# Patient Record
Sex: FEMALE | Race: BLACK OR AFRICAN AMERICAN | Employment: OTHER | URBAN - METROPOLITAN AREA
[De-identification: names, ages, dates, MRNs, and addresses within clinical notes are randomized per-mention and may not be internally consistent; named-entity substitution may affect disease eponyms.]

---

## 2019-02-07 ENCOUNTER — OFFICE VISIT (OUTPATIENT)
Dept: FAMILY MEDICINE CLINIC | Age: 64
End: 2019-02-07

## 2019-02-07 VITALS
WEIGHT: 154.6 LBS | SYSTOLIC BLOOD PRESSURE: 128 MMHG | HEIGHT: 63 IN | RESPIRATION RATE: 16 BRPM | HEART RATE: 71 BPM | TEMPERATURE: 98 F | DIASTOLIC BLOOD PRESSURE: 84 MMHG | BODY MASS INDEX: 27.39 KG/M2 | OXYGEN SATURATION: 99 %

## 2019-02-07 DIAGNOSIS — Z76.89 ENCOUNTER TO ESTABLISH CARE WITH NEW DOCTOR: ICD-10-CM

## 2019-02-07 DIAGNOSIS — M06.9 RHEUMATOID ARTHRITIS, INVOLVING UNSPECIFIED SITE, UNSPECIFIED RHEUMATOID FACTOR PRESENCE: Primary | ICD-10-CM

## 2019-02-07 DIAGNOSIS — Z23 ENCOUNTER FOR IMMUNIZATION: ICD-10-CM

## 2019-02-07 DIAGNOSIS — Z12.39 SCREENING FOR BREAST CANCER: ICD-10-CM

## 2019-02-07 DIAGNOSIS — Z13.1 SCREENING FOR DIABETES MELLITUS: ICD-10-CM

## 2019-02-07 PROBLEM — I01.1 RHEUMATOID AORTITIS: Status: ACTIVE | Noted: 2019-02-07

## 2019-02-07 RX ORDER — ALENDRONATE SODIUM 70 MG/1
TABLET ORAL
Refills: 3 | COMMUNITY
Start: 2019-01-23 | End: 2019-04-26 | Stop reason: ALTCHOICE

## 2019-02-07 RX ORDER — METHOTREXATE 2.5 MG/1
TABLET ORAL
Refills: 0 | COMMUNITY
Start: 2019-01-23 | End: 2019-04-26 | Stop reason: SDUPTHER

## 2019-02-07 RX ORDER — FOLIC ACID 1 MG/1
TABLET ORAL
Refills: 0 | COMMUNITY
Start: 2018-12-16 | End: 2020-01-27 | Stop reason: ALTCHOICE

## 2019-02-07 NOTE — PROGRESS NOTES
Progress Note    Patient: Geraldine Mccabe MRN: <[id number]>  SSN: [id number]    YOB: 1955  Age: 58 y.o. Sex: female        Chief Complaint   Patient presents with   [facility] 3 Months Ago    Referral Request     Referral to Rheumatology     Subjective:     Problems addressed:  Encounter Diagnoses     ICD-10-CM ICD-9-CM   1. Rheumatoid arthritis, involving unspecified site, unspecified rheumatoid factor presence ([facility] M06.9 714.0   2. Encounter to establish care with new doctor Z76.89 V65.8   3. Screening for breast cancer Z12.31 V76.10   4. Screening for diabetes mellitus Z13.1 V77.1   5. Encounter for immunization Z23 V03.89     RA: Has been on Methotrexate for about 1.5 years. Denies joint pain at this time and no recent flares. Denies medication side effects. Wants referral to rheum. Establish care with new doctor: Moved here from [facility] 10/2018. Previous PCP: Dr. Corona Rutledge. Will sign release of information to have records sent over from previous PCP. No new concerns at all today. Current and past medical information:    Current Medications after this visit[de-identified]     Current Outpatient Medications   Medication Sig    alendronate (FOSAMAX) 70 mg tablet TK 1 T PO Q 7 DAY    folic acid (FOLVITE) 1 mg tablet TK 1 T PO DAILY    methotrexate (RHEUMATREX) 2.5 mg tablet TK 5 T PO TWICE A WEEK WITH DINNER     No current facility-administered medications for this visit. 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 No pertinent surgical history.     Social History     Socioeconomic History    Marital status:      Spouse name: Not on file    Number of children: Not on file    Years of education: Not on file    Highest education level: Not on file   Tobacco Use    Smoking status: Former Smoker    Smokeless tobacco: Never Used    Tobacco comment: Quit 3 Years Ago   Substance and Sexual Activity    Alcohol use: Yes     Comment: Social    Drug use: No    Sexual activity: No       Review of Systems   Constitutional: Negative for chills and fever. HENT: Negative for congestion and sore throat. Respiratory: Negative for cough, shortness of breath and wheezing. Cardiovascular: Negative for chest pain. Gastrointestinal: Negative for abdominal pain, nausea and vomiting. Neurological: Negative for dizziness and headaches. Objective:     Vitals:    02/07/19 1308   BP: 128/84   Pulse: 71   Resp: 16   Temp: 98 °F (36.7 °C)   TempSrc: Oral   SpO2: 99%   Weight: 154 lb 9.6 oz (70.1 kg)   Height: 5' 3\" (1.6 m)      Body mass index is 27.39 kg/m². Physical Exam   Constitutional: She appears well-developed and well-nourished. No distress. HENT:   Head: Normocephalic and atraumatic. Cardiovascular: Normal rate, regular rhythm and intact distal pulses. Exam reveals no gallop and no friction rub. No murmur heard. Pulmonary/Chest: Effort normal and breath sounds normal. No respiratory distress. She has no wheezes. She has no rales. She exhibits no tenderness. Musculoskeletal: Normal range of motion. She exhibits no edema, tenderness or deformity. Skin: Skin is warm. She is not diaphoretic. Health Maintenance Due   Topic Date Due    Hepatitis C Screening  1955    DTaP/Tdap/Td series (1 - Tdap) 07/26/1975    PAP AKA CERVICAL CYTOLOGY  07/26/1975    Shingrix Vaccine Age 50> (1 of 2) 07/26/2004    BREAST CANCER SCRN MAMMOGRAM  07/26/2004    FOBT Q 1 YEAR AGE 50-75  07/26/2004    Influenza Age 9 to Adult  08/01/2018       Assessment and orders:     Encounter Diagnoses     ICD-10-CM ICD-9-CM   1. Rheumatoid arthritis, involving unspecified site, unspecified rheumatoid factor presence ([facility] M06.9 714.0   2. Encounter to establish care with new doctor Z76.89 V65.8   3. Screening for breast cancer Z12.31 V76.10   4. Screening for diabetes mellitus Z13.1 V77.1   5. Encounter for immunization Z23 V03.89     1. Rheumatoid arthritis:  - REFERRAL TO RHEUMATOLOGY  - CBC WITH AUTOMATED DIFF  - METABOLIC PANEL, COMPREHENSIVE  - LIPID PANEL    3. Screening for breast cancer  - HODA MAMMO BI SCREENING INCL CAD; Future    4. Screening for diabetes mellitus  - HEMOGLOBIN A1C WITH EAG    5. Encounter for immunization  - varicella-zoster recombinant, PF, (SHINGRIX) 50 mcg/0.5 mL susr injection; 0.5 mL by IntraMUSCular route once for 1 dose. Dispense: 0.5 mL; Refill: 1    Follow up for well woman exam.     Plan of care:  Discussed diagnoses in detail with patient. Medication risks/benefits/side effects discussed with patient. All of the patient's questions were addressed. The patient understands and agrees with our plan of care. The patient knows to call back if they are unsure of or forget any changes we discussed today or if the symptoms change. The patient received an After-Visit Summary which contains VS, orders, medication list and allergy list. This can be used as a \"mini-medical record\" should they have to seek medical care while out of town. Follow-up Disposition:  Return in about 6 months (around 8/7/2019). No future appointments.     Signed By: Tim Villa MD     February 7, 2019

## 2019-02-07 NOTE — PROGRESS NOTES
58year old female here to establish care    Has rheumatoid arthritis    Needs referral to rheumatologist    Only other medication is fosamax    Routine labs today    I reviewed with the resident the medical history and the resident's findings on the physical examination. I discussed with the resident the patient's diagnosis and concur with the plan.

## 2019-02-07 NOTE — PROGRESS NOTES
Identified Patient with two Patient identifiers (Name and ). Two Patient Identifiers confirmed. Reviewed record in preparation for visit and have obtained necessary documentation. Chief Complaint   Patient presents with   [facility] [place] 3 Months Ago    Medication Refill    Referral Request     Referral to Rheumatology       Visit Vitals  Ht 5' 3\" (1.6 m)   Wt 154 lb 9.6 oz (70.1 kg)   BMI 27.39 kg/m²       1. Have you been to the ER, urgent care clinic since your last visit? Hospitalized since your last visit? No    2. Have you seen or consulted any other health care providers outside of the [address] [place] [address] since your last visit? Include any pap smears or colon screening.  No

## 2019-02-07 NOTE — PATIENT INSTRUCTIONS
Nan Lr. MD Daniel  [facility]  [facility] [address] [facility], [address]  Phone: [phone]  Fax: [phone]    Jasper Gonzalez.  MD Louie Keys MD  [facility] [address] [facility], [address] [facility]  Phone: [phone]  Fax: [phone]

## 2019-02-08 LAB
ALBUMIN SERPL-MCNC: 4.2 G/DL (ref 3.6–4.8)
ALBUMIN/GLOB SERPL: 1.4 {RATIO} (ref 1.2–2.2)
ALP SERPL-CCNC: 52 IU/L (ref 39–117)
ALT SERPL-CCNC: 28 IU/L (ref 0–32)
AST SERPL-CCNC: 32 IU/L (ref 0–40)
BASOPHILS # BLD AUTO: 0 X10E3/UL (ref 0–0.2)
BASOPHILS NFR BLD AUTO: 1 %
BILIRUB SERPL-MCNC: 0.4 MG/DL (ref 0–1.2)
BUN SERPL-MCNC: 14 MG/DL (ref 8–27)
BUN/CREAT SERPL: 24 (ref 12–28)
CALCIUM SERPL-MCNC: 10.1 MG/DL (ref 8.7–10.3)
CHLORIDE SERPL-SCNC: 103 MMOL/L (ref 96–106)
CHOLEST SERPL-MCNC: 198 MG/DL (ref 100–199)
CO2 SERPL-SCNC: 21 MMOL/L (ref 20–29)
CREAT SERPL-MCNC: 0.58 MG/DL (ref 0.57–1)
EOSINOPHIL # BLD AUTO: 0.3 X10E3/UL (ref 0–0.4)
EOSINOPHIL NFR BLD AUTO: 3 %
ERYTHROCYTE [DISTWIDTH] IN BLOOD BY AUTOMATED COUNT: 13.9 % (ref 12.3–15.4)
EST. AVERAGE GLUCOSE BLD GHB EST-MCNC: 105 MG/DL
GLOBULIN SER CALC-MCNC: 2.9 G/DL (ref 1.5–4.5)
GLUCOSE SERPL-MCNC: 90 MG/DL (ref 65–99)
HBA1C MFR BLD: 5.3 % (ref 4.8–5.6)
HCT VFR BLD AUTO: 38.5 % (ref 34–46.6)
HDLC SERPL-MCNC: 63 MG/DL
HGB BLD-MCNC: 13 G/DL (ref 11.1–15.9)
IMM GRANULOCYTES # BLD AUTO: 0 X10E3/UL (ref 0–0.1)
IMM GRANULOCYTES NFR BLD AUTO: 0 %
INTERPRETATION, 910389: NORMAL
LDLC SERPL CALC-MCNC: 120 MG/DL (ref 0–99)
LYMPHOCYTES # BLD AUTO: 2.1 X10E3/UL (ref 0.7–3.1)
LYMPHOCYTES NFR BLD AUTO: 25 %
MCH RBC QN AUTO: 32.8 PG (ref 26.6–33)
MCHC RBC AUTO-ENTMCNC: 33.8 G/DL (ref 31.5–35.7)
MCV RBC AUTO: 97 FL (ref 79–97)
MONOCYTES # BLD AUTO: 0.7 X10E3/UL (ref 0.1–0.9)
MONOCYTES NFR BLD AUTO: 8 %
NEUTROPHILS # BLD AUTO: 5.5 X10E3/UL (ref 1.4–7)
NEUTROPHILS NFR BLD AUTO: 63 %
PLATELET # BLD AUTO: 373 X10E3/UL (ref 150–379)
POTASSIUM SERPL-SCNC: 4.5 MMOL/L (ref 3.5–5.2)
PROT SERPL-MCNC: 7.1 G/DL (ref 6–8.5)
RBC # BLD AUTO: 3.96 X10E6/UL (ref 3.77–5.28)
SODIUM SERPL-SCNC: 141 MMOL/L (ref 134–144)
TRIGL SERPL-MCNC: 75 MG/DL (ref 0–149)
VLDLC SERPL CALC-MCNC: 15 MG/DL (ref 5–40)
WBC # BLD AUTO: 8.6 X10E3/UL (ref 3.4–10.8)

## 2019-03-27 ENCOUNTER — OFFICE VISIT (OUTPATIENT)
Dept: RHEUMATOLOGY | Age: 64
End: 2019-03-27

## 2019-03-27 VITALS
WEIGHT: 152 LBS | OXYGEN SATURATION: 95 % | TEMPERATURE: 98.3 F | HEIGHT: 63 IN | RESPIRATION RATE: 18 BRPM | SYSTOLIC BLOOD PRESSURE: 127 MMHG | HEART RATE: 73 BPM | BODY MASS INDEX: 26.93 KG/M2 | DIASTOLIC BLOOD PRESSURE: 84 MMHG

## 2019-03-27 DIAGNOSIS — M19.042 PRIMARY OSTEOARTHRITIS OF BOTH HANDS: ICD-10-CM

## 2019-03-27 DIAGNOSIS — M19.041 PRIMARY OSTEOARTHRITIS OF BOTH HANDS: ICD-10-CM

## 2019-03-27 DIAGNOSIS — M81.0 OSTEOPOROSIS, UNSPECIFIED OSTEOPOROSIS TYPE, UNSPECIFIED PATHOLOGICAL FRACTURE PRESENCE: ICD-10-CM

## 2019-03-27 DIAGNOSIS — Z79.60 LONG-TERM USE OF IMMUNOSUPPRESSANT MEDICATION: ICD-10-CM

## 2019-03-27 DIAGNOSIS — M05.9 SEROPOSITIVE RHEUMATOID ARTHRITIS (HCC): Primary | ICD-10-CM

## 2019-03-27 NOTE — PROGRESS NOTES
REASON FOR VISIT    This is the initial evaluation for Ms. Adrian Silva a 58 y.o.  female for question of rheumatoid arthritis. The patient is referred to the [facility] at the request of Dr. Shania Martin. HISTORY OF PRESENT ILLNESS     This is a 58 y.o. female with reported history of rheumatoid arthritis on methotrexate 2.5 mg oral weekly and osteoporosis on Fosamax 70 mg oral weekly. MHAQ: 0.25  Pain scale: 75/100    The patient notes that she was diagnosed with rheumatoid arthritis in 2013 due to pain in hands and swelling. The patient was seen by a rheumatologist in [place]. She was put on prednisone briefly. The patient was put on methotrexate 12.5 mg twice a week with daily folic acid. She did very well with the methotrexate. She was having 0 pain. She was told to stop taking the methotrexate once day of the week so she was taking 12.5 mg oral weekly. This was a year ago. She was still doing well. But then she started exercising and picking up weights which was about 3 months ago and then her rheumatoid flared. She has swelling in wrists. She has pain in hands and they are swollen. Morning stiffness for a few hours. She has to put her hands in the hot water. She takes aleve sometime and it helps for a little while. No recent use of prednisone. Last time she saw her rheumatologist was in August 2018. Since she was having some pain she increased her methotrexate back to 10 pills a week (split over Saturday and Sunday). She fell in 2006 and broke wrist in 2 places. She has been on fosamax since 2013 as well. She had a DEXA scan and it showed osteoporosis. NO other issues. REVIEW OF SYSTEMS    A 15 point review of systems was performed and summarized below. The questionnaire was reviewed with the patient and scanned into the patient's medical record.     General: denies recent weight gain, recent weight loss, fatigue, weakness, fever, night sweats  Musculoskeletal: endorses joint pain, joint swelling, morning stiffness (lasting 180 minutes), muscle pain  denies   Ears:  denies ringing in ears, loss of hearing, deafness  Eyes:  denies pain, redness, loss of vision, double vision, blurred vision, dryness, foreign body sensation  Mouth:  denies sore tongue, oral ulcers, bleeding gums, loss of taste, dryness, increased dental caries  Nose:  denies nosebleeds, loss of smell, nasal ulcers  Throat: denies frequent sore throats, hoarseness, difficulty in swallowing, pain in jaw while chewing  Neck: denies swollen glands, tender glands  Cardiopulmonary: denies pain in chest, irregular heart beat, sudden changes in heart beat, shortness of breath, difficulty breathing at night, dry cough, productive cough, coughing of blood, wheezing  Gastrointestinal: denies nausea, heartburn, stomach pain relieved by food, vomiting of blood/\"coffee grounds\", jaundice, increasing constipation, persistent diarrhea, blood in stools, black stools  Genitourinary: denies nocturia, difficult urination, pain or burning on urination, blood in urine, cloudy urine, pus in urine, genital discharge, frequent urination, vaginal dryness, rash/ulcers, sexual difficulties  Hematologic:  denies anemia, bleeding tendency, blood clots  Skin:  denies easy bruising, sun sensitive, rash, redness, hives, skin tightness, nodules/bumps, hair loss, color changes of hands or feet in the cold (Raynaud's), nailbed changes, mechanics hands  Neurologic: denies headaches, dizziness, muscle weakness, numbness or tingling in hands/feet, memory loss  Psychiatric:  denies depression, excessive worries, PTSD, Bipolar  Sleep: denies poor sleep (8 hours), denies snoring, apnea, daytime somnolence, difficulty falling asleep, difficulty staying asleep     PAST MEDICAL HISTORY    Past Medical History:   Diagnosis Date    Osteoporosis     Rheumatoid arthritis ([facility]         History reviewed.  No pertinent surgical history. FAMILY HISTORY    Family History   Problem Relation Age of Onset    No Known Problems Mother     No Known Problems Father        SOCIAL HISTORY    Social History     Tobacco Use    Smoking status: Former Smoker    Smokeless tobacco: Never Used    Tobacco comment: Quit 3 Years Ago   Substance Use Topics    Alcohol use: Yes     Comment: Social    Drug use: No       MEDICATIONS    Current Outpatient Medications   Medication Sig Dispense Refill    alendronate (FOSAMAX) 70 mg tablet TK 1 T PO Q 7 DAY  3    folic acid (FOLVITE) 1 mg tablet TK 1 T PO DAILY  0    methotrexate (RHEUMATREX) 2.5 mg tablet TK 5 T PO TWICE A WEEK WITH DINNER  0       ALLERGIES    No Known Allergies    PHYSICAL EXAMINATION    Visit Vitals  /84 (BP 1 Location: Right arm, BP Patient Position: Sitting)   Pulse 73   Temp 98.3 °F (36.8 °C) (Oral)   Resp 18   Ht 5' 3\" (1.6 m)   Wt 152 lb (68.9 kg)   SpO2 95%   BMI 26.93 kg/m²     Body mass index is 26.93 kg/m². General: NAD  HEENT: PERRL, anicteric, non-injected sclerae; oropharynx without ulcers, erythema, or exudate. Moist mucous membranes. Lymphatic: No cervical or axillary lymphadenopathy. Cardiovascular: S1, S2,no R/M/G  Pulmonary: CTA b/l. No wheezes/rales/rhonchi. Abdominal: Soft,NTND, + BS. Skin: No rash, nodules, or periungual changes.   Neuro: Alert; able to carry normal conversation    Musculoskeletal:   B/l CMC squaring noted    Joint Count 3/27/2019   Patient pain (0-100) 75   MHAQ 0.25   Left thumb IP - Swollen 0   Left 3rd PIP - Tender 0   Right wrist- Tender 1   Right wrist- Swollen 1   Tender Joint Count (Total) 1   Swollen Joint Count (Total) 1   Physician Assessment (0-10) 4   Patient Assessment (0-10) 5   CDAI Total (calculated) 11       DATA REVIEW    Prior medical records were reviewed and if applicable are summarized as below:    Labs:   2/2019: cbc, CMP unremarkable    Imaging:   N/A    ASSESSMENT AND PLAN    A 58 y.o. female with hx of osteoporosis on fosamax, rheumatoid arthritis on methotrexate 25 mg oral weekly presents to establish care for rheumatoid arthritis. The patient has low to moderate disease activity and is doing well overall. She has some symptoms still but she only recently increased her methotrexate. # Rheumatoid arthritis:  - for now continue methotrexate 25 mg oral weekly  - RF, CCP, ESR, CRP  - b/l hand and foot xrays   - daily folic acid    # Osteoporosis:  - on fosamax weekly  - DEXA scan ordered today    # Medication Toxicity Monitoring:  - cbc, cmp normal last month  - Hepatitis B, C: ordered today  - Quant gold: ordered today  - Immunizations: discuss at next visit. - bone health: see above    The patient voiced understanding of the aforementioned assessment and plan. Summary of plan was provided in the After Visit Summary patient instructions. I also provided education about MyChart setup and utility. Ms. Lazarus Pozo has a reminder for a \"due or due soon\" health maintenance. I have asked that she contact her primary care provider for follow-up on this health maintenance.     TODAY'S ORDERS    Orders Placed This Encounter    QUANTIFERON-TB GOLD PLUS    XR HAND RT MIN 3 V    XR HAND LT MIN 3 V    XR FOOT RT MIN 3 V    XR FOOT LT MIN 3 V    DEXA BONE DENSITY STUDY AXIAL    CYCLIC CITRUL PEPTIDE AB, IGG    SED RATE (ESR)    C REACTIVE PROTEIN, QT    RHEUMATOID FACTOR, QL    CHRONIC HEPATITIS PANEL       Future Appointments   Date Time Provider Department Center   4/24/2019 10:00 AM Gaurang Patterson  Gloria Patrick MD    Adult Rheumatology   [facility]  A Part of [facility], [address] [place] [address]   Phone [phone]  Fax [phone]

## 2019-03-27 NOTE — PROGRESS NOTES
Chief Complaint   Patient presents with    Joint Pain     hands     1. Have you been to the ER, urgent care clinic since your last visit? Hospitalized since your last visit? No    2. Have you seen or consulted any other health care providers outside of the [address] [place] [address] since your last visit? Include any pap smears or colon screening.  No

## 2019-03-31 LAB
CCP IGA+IGG SERPL IA-ACNC: >250 UNITS (ref 0–19)
COMMENT, 144067: NORMAL
CRP SERPL-MCNC: 4.2 MG/L (ref 0–4.9)
ERYTHROCYTE [SEDIMENTATION RATE] IN BLOOD BY WESTERGREN METHOD: 29 MM/HR (ref 0–40)
GAMMA INTERFERON BACKGROUND BLD IA-ACNC: 0.02 IU/ML
HBV CORE AB SERPL QL IA: NEGATIVE
HBV CORE IGM SERPL QL IA: ABNORMAL
HBV E AB SERPL QL IA: NEGATIVE
HBV E AG SERPL QL IA: NEGATIVE
HBV SURFACE AB SER QL: NON REACTIVE
HBV SURFACE AG SERPL QL IA: NEGATIVE
HCV AB S/CO SERPL IA: <0.1 S/CO RATIO (ref 0–0.9)
M TB IFN-G BLD-IMP: NEGATIVE
M TB IFN-G CD4+ BCKGRND COR BLD-ACNC: 0.02 IU/ML
MITOGEN IGNF BLD-ACNC: >10 IU/ML
QUANTIFERON INCUBATION, QF1T: NORMAL
QUANTIFERON TB2 AG: 0.01 IU/ML
RHEUMATOID FACT SERPL-ACNC: 172.7 IU/ML (ref 0–13.9)
SERVICE CMNT-IMP: NORMAL

## 2019-04-01 DIAGNOSIS — Z79.60 LONG-TERM USE OF IMMUNOSUPPRESSANT MEDICATION: Primary | ICD-10-CM

## 2019-04-01 NOTE — PROGRESS NOTES
Patient notified of the above. States she is out of town and won't be back until Thursday. States she will have blood work done when she returns.

## 2019-04-12 ENCOUNTER — HOSPITAL ENCOUNTER (OUTPATIENT)
Dept: MAMMOGRAPHY | Age: 64
Discharge: HOME | End: 2019-04-12
Attending: INTERNAL MEDICINE
Payer: COMMERCIAL

## 2019-04-12 DIAGNOSIS — M05.9 SEROPOSITIVE RHEUMATOID ARTHRITIS (HCC): ICD-10-CM

## 2019-04-12 DIAGNOSIS — M81.0 OSTEOPOROSIS, UNSPECIFIED OSTEOPOROSIS TYPE, UNSPECIFIED PATHOLOGICAL FRACTURE PRESENCE: ICD-10-CM

## 2019-04-12 LAB
COMMENT, 144067: NORMAL
HBV CORE AB SERPL QL IA: NEGATIVE
HBV CORE IGM SERPL QL IA: NEGATIVE
HBV DNA SERPL NAA+PROBE-ACNC: NORMAL IU/ML
HBV DNA SERPL NAA+PROBE-LOG IU: NORMAL LOG10 IU/ML
HBV E AB SERPL QL IA: NEGATIVE
HBV E AG SERPL QL IA: NEGATIVE
HBV SURFACE AB SER QL: NON REACTIVE
HBV SURFACE AG SERPL QL IA: NEGATIVE
HCV AB S/CO SERPL IA: <0.1 S/CO RATIO (ref 0–0.9)
REF LAB TEST REF RANGE: NORMAL

## 2019-04-12 PROCEDURE — 77080 DXA BONE DENSITY AXIAL: CPT

## 2019-04-26 ENCOUNTER — OFFICE VISIT (OUTPATIENT)
Dept: RHEUMATOLOGY | Age: 64
End: 2019-04-26

## 2019-04-26 VITALS
RESPIRATION RATE: 20 BRPM | HEIGHT: 61 IN | BODY MASS INDEX: 27.9 KG/M2 | DIASTOLIC BLOOD PRESSURE: 76 MMHG | TEMPERATURE: 98.5 F | OXYGEN SATURATION: 98 % | SYSTOLIC BLOOD PRESSURE: 135 MMHG | HEART RATE: 62 BPM | WEIGHT: 147.8 LBS

## 2019-04-26 DIAGNOSIS — Z79.60 LONG-TERM USE OF IMMUNOSUPPRESSANT MEDICATION: ICD-10-CM

## 2019-04-26 DIAGNOSIS — M81.0 OSTEOPOROSIS, UNSPECIFIED OSTEOPOROSIS TYPE, UNSPECIFIED PATHOLOGICAL FRACTURE PRESENCE: ICD-10-CM

## 2019-04-26 DIAGNOSIS — M05.9 SEROPOSITIVE RHEUMATOID ARTHRITIS (HCC): Primary | ICD-10-CM

## 2019-04-26 DIAGNOSIS — M19.042 PRIMARY OSTEOARTHRITIS OF BOTH HANDS: ICD-10-CM

## 2019-04-26 DIAGNOSIS — M19.041 PRIMARY OSTEOARTHRITIS OF BOTH HANDS: ICD-10-CM

## 2019-04-26 RX ORDER — METHOTREXATE 2.5 MG/1
20 TABLET ORAL
Qty: 40 TAB | Refills: 3 | Status: SHIPPED | OUTPATIENT
Start: 2019-04-27 | End: 2019-06-14 | Stop reason: SDUPTHER

## 2019-04-26 NOTE — PROGRESS NOTES
REASON FOR VISIT Patient presents for a follow up visit. HISTORY OF DISEASE: Seropositive Rheumatoid Arthritis; erosive Year of diagnosis: 2013 First visit with me:  3/2019 Cumulative disease manifestations:  
Positive serologies/labs: RF, CCP Negative serologies/labs: Other co-morbidities:  osteoporosis Relapses: 12/2018 but on not treatment Past treatment history: 
Prednisone:  
Non-biologic DMARD: Methotrexate (2013-present) Biologic: 
Cyclophosphamide:  
Rituximab: Other: 
  
HISTORY OF PRESENT ILLNESS The patient notes that her joints are doing very well. She has cut out the weights. She is currently taking 5 pills on Saturday and 5 pills on Sunday. She takes daily folic acid. She denies any swelling in joints. No stiffness in the morning in joints. She has been on alendronate since 2013. REVIEW OF SYSTEMS A comprehensive review of systems was performed and pertinent results are documented in the HPI, review of systems is otherwise non-contributory. PAST MEDICAL HISTORY Past Medical History:  
Diagnosis Date  Osteoporosis  Rheumatoid arthritis ([facility] History reviewed. No pertinent surgical history. FAMILY HISTORY Family History Problem Relation Age of Onset  No Known Problems Mother  No Known Problems Father SOCIAL HISTORY Social History Tobacco Use  Smoking status: Former Smoker  Smokeless tobacco: Never Used  Tobacco comment: Quit 3 Years Ago Substance Use Topics  Alcohol use: Yes Comment: Social  
 Drug use: No  
 
 
MEDICATIONS Current Outpatient Medications Medication Sig Dispense Refill  [START ON 4/27/2019] methotrexate (RHEUMATREX) 2.5 mg tablet Take 8 Tabs by mouth Every Saturday. 40 Tab 3  
 calcium carbonate-vitamin D3 (CALTRATE 600 + D) 600 mg (1,500 mg)-800 unit chew Take 2 Tabs by mouth daily.  482 Tab 3  
 folic acid (FOLVITE) 1 mg tablet TK 1 T PO DAILY  0  
 
 
ALLERGIES 
 
 No Known Allergies PHYSICAL EXAMINATION Visit Vitals /76 (BP 1 Location: Left arm, BP Patient Position: Sitting) Pulse 62 Temp 98.5 °F (36.9 °C) (Oral) Resp 20 Ht 5' 1\" (1.549 m) Wt 147 lb 12.8 oz (67 kg) SpO2 98% BMI 27.93 kg/m² Body mass index is 27.93 kg/m². General: NAD HEENT: PERRL, anicteric, non-injected sclerae; oropharynx without ulcers, erythema, or exudate. Moist mucous membranes. Lymphatic: No cervical or axillary lymphadenopathy. Cardiovascular: S1, S2,no R/M/G Pulmonary: CTA b/l. No wheezes/rales/rhonchi. Abdominal: Soft,NTND, + BS. Skin: No rash, nodules, or periungual changes. Neuro: Alert; able to carry normal conversation Musculoskeletal: B/l CMC squaring noted Right wrist with a fusion s/p injury Joint Count 4/26/2019 3/27/2019 Patient pain (0-100) 0 75 MHAQ 0 0.25 Left 4th MCP - Tender 0 - Left 4th MCP - Swollen 0 - Left thumb IP - Swollen - 0 Left 3rd PIP - Tender - 0 Right wrist- Tender - 1 Right wrist- Swollen - 1 Tender Joint Count (Total) 0 1 Swollen Joint Count (Total) 0 1 Physician Assessment (0-10) 0 4 Patient Assessment (0-10) 0 5 CDAI Total (calculated) 0 11 DATA REVIEW Prior medical records were reviewed and if applicable are summarized as below: 
 
Labs:  
3/2019: CCP, RF positive, ESR, CRP normal, Hep B, C, quant gold negative 2/2019: cbc, CMP unremarkable Imaging:  
Hand X-rays (3/2019): Personally reviewed images. + erosions consistent with RA and severe OA Foot x-rays (3/2019): Personally reviewed images. + erosions DEXA (3/2019): T score -2.6 at the left radius ASSESSMENT AND PLAN 
 
A 58 y.o. female with hx of osteoporosis on fosamax, seropositive erosive rheumatoid arthritis on methotrexate 25 mg oral weekly presents for a follow up visit. Patient has joint damage but she appears to have erosive osteoarthritis as well and large part of damage is from erosive OA.  Given that she is symptomatically feeling well and has had many different dosages (including incorrect administration) of methotrexate, I will hold off on escalating therapy. # Seropositive erosive Rheumatoid arthritis: 
- advised patient to take methotrexate on the same day of the week. Take 20 mg oral weekly 
- daily folic acid. - If need to, will add xeljanz at next visit. # Osteoporosis: 
- on fosamax weekly but patient has been on it for over 5 years. She needs a drug holiday 
- will change medication to prolia ever 6 months. Explained the risks and benefits of the medication. Will place the order today. # Hand osteoarthritis: largely asymptomatic. Erosive osteoarthritis. - will continue to monitor # Medication Toxicity Monitoring: 
- cbc, cmp normal 2/2019 
- Hepatitis B, C: negative 3/2019 
- Quant gold: negative 3/2019 - Immunizations: discuss at next visit. - bone health: see above The patient voiced understanding of the aforementioned assessment and plan. Summary of plan was provided in the After Visit Summary patient instructions. I also provided education about [employer] setup and utility. Ms. [address] [facility] has a reminder for a \"due or due soon\" health maintenance. I have asked that she contact her primary care provider for follow-up on this health maintenance. TODAY'S ORDERS Orders Placed This Encounter  methotrexate (RHEUMATREX) 2.5 mg tablet  calcium carbonate-vitamin D3 (CALTRATE 600 + D) 600 mg (1,500 mg)-800 unit chew Future Appointments Date Time Provider Catherine Reynaga 7/26/2019 10:00 AM Landy Melton MD [address] [place] [address] Ken Betancur MD 
 
Adult Rheumatology [facility], [address] [place] [address] Phone [phone] Fax [phone]

## 2019-04-26 NOTE — PATIENT INSTRUCTIONS
Methotrexate: Take 8 pills every Saturday. Take daily folic acid. Stop the Alendronate I will put in a prescription for Prolia. You will get it twice a year. You will get a call to schedule it. Take daily vitamin D and calcium

## 2019-04-26 NOTE — PROGRESS NOTES
Chief Complaint Patient presents with  Joint Pain 1. Have you been to the ER, urgent care clinic since your last visit? Hospitalized since your last visit? No 
 
2. Have you seen or consulted any other health care providers outside of the [address] [place] [address] since your last visit? Include any pap smears or colon screening.  No

## 2019-05-13 ENCOUNTER — HOSPITAL ENCOUNTER (OUTPATIENT)
Dept: INFUSION THERAPY | Age: 64
Discharge: HOME | End: 2019-05-13
Payer: COMMERCIAL

## 2019-05-13 VITALS
HEART RATE: 80 BPM | SYSTOLIC BLOOD PRESSURE: 130 MMHG | TEMPERATURE: 98 F | DIASTOLIC BLOOD PRESSURE: 80 MMHG | RESPIRATION RATE: 16 BRPM

## 2019-05-13 LAB
ALBUMIN SERPL-MCNC: 3.5 G/DL (ref 3.5–5)
ANION GAP SERPL CALC-SCNC: 7 MMOL/L (ref 5–15)
BUN SERPL-MCNC: 12 MG/DL (ref 6–20)
BUN/CREAT SERPL: 16 (ref 12–20)
CALCIUM SERPL-MCNC: 9.7 MG/DL (ref 8.5–10.1)
CHLORIDE SERPL-SCNC: 107 MMOL/L (ref 97–108)
CO2 SERPL-SCNC: 26 MMOL/L (ref 21–32)
CREAT SERPL-MCNC: 0.75 MG/DL (ref 0.55–1.02)
GLUCOSE SERPL-MCNC: 96 MG/DL (ref 65–100)
MAGNESIUM SERPL-MCNC: 2.4 MG/DL (ref 1.6–2.4)
PHOSPHATE SERPL-MCNC: 3.1 MG/DL (ref 2.6–4.7)
PHOSPHATE SERPL-MCNC: 3.2 MG/DL (ref 2.6–4.7)
POTASSIUM SERPL-SCNC: 3.9 MMOL/L (ref 3.5–5.1)
SODIUM SERPL-SCNC: 140 MMOL/L (ref 136–145)

## 2019-05-13 PROCEDURE — 80047 BASIC METABLC PNL IONIZED CA: CPT

## 2019-05-13 PROCEDURE — 36415 COLL VENOUS BLD VENIPUNCTURE: CPT

## 2019-05-13 PROCEDURE — 84100 ASSAY OF PHOSPHORUS: CPT

## 2019-05-13 PROCEDURE — 96372 THER/PROPH/DIAG INJ SC/IM: CPT

## 2019-05-13 PROCEDURE — 74011250636 HC RX REV CODE- 250/636: Performed by: INTERNAL MEDICINE

## 2019-05-13 PROCEDURE — 83735 ASSAY OF MAGNESIUM: CPT

## 2019-05-13 PROCEDURE — 80069 RENAL FUNCTION PANEL: CPT

## 2019-05-13 RX ADMIN — DENOSUMAB 60 MG: 60 INJECTION SUBCUTANEOUS at 13:26

## 2019-05-13 NOTE — PROGRESS NOTES
[facility] VISIT NOTE 
 
1110 Pt arrived at [place] [employer] [facility] and in no distress for Prolia. Assessment completed, pt denies any complaints today. Discussed common side effects of Prolia. Blood pressure 130/80, pulse 80, temperature 98 °F (36.7 °C), resp. rate 16, not currently breastfeeding. Labs drawn peripherally. ISTAT machine did not result. Renal panel ordered and sent to the hospital.  All labs are within treatment parameters. Recent Results (from the past 12 hour(s)) MAGNESIUM Collection Time: 05/13/19 11:20 AM  
Result Value Ref Range Magnesium 2.4 1.6 - 2.4 mg/dL PHOSPHORUS Collection Time: 05/13/19 11:20 AM  
Result Value Ref Range Phosphorus 3.2 2.6 - 4.7 MG/DL RENAL FUNCTION PANEL Collection Time: 05/13/19 11:20 AM  
Result Value Ref Range Sodium 140 136 - 145 mmol/L Potassium 3.9 3.5 - 5.1 mmol/L Chloride 107 97 - 108 mmol/L  
 CO2 26 21 - 32 mmol/L Anion gap 7 5 - 15 mmol/L Glucose 96 65 - 100 mg/dL BUN 12 6 - 20 MG/DL Creatinine 0.75 0.55 - 1.02 MG/DL  
 BUN/Creatinine ratio 16 12 - 20 GFR est AA >60 >60 ml/min/1.73m2 GFR est non-AA >60 >60 ml/min/1.73m2 Calcium 9.7 8.5 - 10.1 MG/DL Phosphorus 3.1 2.6 - 4.7 MG/DL Albumin 3.5 3.5 - 5.0 g/dL Medications received: 
Prolia SQ in right upper arm Tolerated treatment well, no adverse reaction noted. [address] D/C'd from [email or web address] and in no distress.  Next appointment is 11/11/19 at 11am.

## 2019-05-16 LAB
ANION GAP BLD CALC-SCNC: ABNORMAL MMOL/L (ref 10–20)
BUN BLD-MCNC: ABNORMAL MG/DL (ref 9–20)
CA-I BLD-MCNC: 1.16 MMOL/L (ref 1.12–1.32)
CHLORIDE BLD-SCNC: ABNORMAL MMOL/L (ref 98–107)
CO2 BLD-SCNC: ABNORMAL MMOL/L (ref 21–32)
CREAT BLD-MCNC: 0.7 MG/DL (ref 0.6–1.3)
GLUCOSE BLD-MCNC: 102 MG/DL (ref 65–100)
HCT VFR BLD CALC: 38 % (ref 35–47)
POTASSIUM BLD-SCNC: 4 MMOL/L (ref 3.5–5.1)
SERVICE CMNT-IMP: ABNORMAL
SODIUM BLD-SCNC: 142 MMOL/L (ref 136–145)

## 2019-06-14 RX ORDER — METHOTREXATE 2.5 MG/1
20 TABLET ORAL
Qty: 40 TAB | Refills: 3 | Status: SHIPPED | OUTPATIENT
Start: 2019-06-15 | End: 2019-10-25 | Stop reason: SDUPTHER

## 2019-06-14 NOTE — TELEPHONE ENCOUNTER
----- Message from [place] sent at 6/14/2019 11:55 AM EDT -----  Regarding: Dr. Richard [place]: [phone]  Pt needs prescription (Methotrexate) sent to [facility] on file.

## 2019-07-26 ENCOUNTER — OFFICE VISIT (OUTPATIENT)
Dept: RHEUMATOLOGY | Age: 64
End: 2019-07-26

## 2019-07-26 VITALS
DIASTOLIC BLOOD PRESSURE: 82 MMHG | RESPIRATION RATE: 18 BRPM | HEART RATE: 66 BPM | OXYGEN SATURATION: 99 % | SYSTOLIC BLOOD PRESSURE: 137 MMHG | BODY MASS INDEX: 28.51 KG/M2 | HEIGHT: 61 IN | TEMPERATURE: 98.8 F | WEIGHT: 151 LBS

## 2019-07-26 DIAGNOSIS — Z79.60 LONG-TERM USE OF IMMUNOSUPPRESSANT MEDICATION: ICD-10-CM

## 2019-07-26 DIAGNOSIS — M19.042 PRIMARY OSTEOARTHRITIS OF BOTH HANDS: ICD-10-CM

## 2019-07-26 DIAGNOSIS — M19.041 PRIMARY OSTEOARTHRITIS OF BOTH HANDS: ICD-10-CM

## 2019-07-26 DIAGNOSIS — M81.0 OSTEOPOROSIS, UNSPECIFIED OSTEOPOROSIS TYPE, UNSPECIFIED PATHOLOGICAL FRACTURE PRESENCE: ICD-10-CM

## 2019-07-26 DIAGNOSIS — M05.9 SEROPOSITIVE RHEUMATOID ARTHRITIS (HCC): Primary | ICD-10-CM

## 2019-07-26 NOTE — PATIENT INSTRUCTIONS
Please fill out your [address] you will receive after your visit in the mail or via [phone]!

## 2019-07-26 NOTE — PROGRESS NOTES
REASON FOR VISIT    Patient presents for a follow up visit. HISTORY OF DISEASE: Seropositive Rheumatoid Arthritis; erosive    Year of diagnosis: 2013  First visit with me:  3/2019  Cumulative disease manifestations:   Positive serologies/labs: RF, CCP  Negative serologies/labs: Other co-morbidities:  osteoporosis  Relapses: 12/2018 but on not treatment     Past treatment history:  Prednisone:   Non-biologic DMARD: Methotrexate 20 mg oral weekly (2013-present)  Biologic:  Cyclophosphamide:   Rituximab: Other:     HISTORY OF PRESENT ILLNESS     The patient notes joints are doing well. No joint pain, swelling and no stiffness of the joints. She takes methotrexate 20 mg oral weekly. Daily folic acid. REVIEW OF SYSTEMS    A comprehensive review of systems was performed and pertinent results are documented in the HPI, review of systems is otherwise non-contributory. PAST MEDICAL HISTORY    Past Medical History:   Diagnosis Date    Osteoporosis     Rheumatoid arthritis ([address]         History reviewed. No pertinent surgical history. FAMILY HISTORY    Family History   Problem Relation Age of Onset    No Known Problems Mother     No Known Problems Father        SOCIAL HISTORY    Social History     Tobacco Use    Smoking status: Former Smoker    Smokeless tobacco: Never Used    Tobacco comment: Quit 3 Years Ago   Substance Use Topics    Alcohol use: Yes     Comment: Social    Drug use: No       MEDICATIONS    Current Outpatient Medications   Medication Sig Dispense Refill    methotrexate (RHEUMATREX) 2.5 mg tablet Take 8 Tabs by mouth Every Saturday. 40 Tab 3    calcium carbonate-vitamin D3 (CALTRATE 600 + D) 600 mg (1,500 mg)-800 unit chew Take 2 Tabs by mouth daily.  838 Tab 3    folic acid (FOLVITE) 1 mg tablet TK 1 T PO DAILY  0       ALLERGIES    No Known Allergies    PHYSICAL EXAMINATION    Visit Vitals  /82 (BP 1 Location: Right arm, BP Patient Position: Sitting)   Pulse 66   Temp 98.8 °F (37.1 °C) (Oral)   Resp 18   Ht 5' 1\" (1.549 m)   Wt 151 lb (68.5 kg)   SpO2 99%   BMI 28.53 kg/m²     Body mass index is 28.53 kg/m². General: NAD  HEENT: PERRL, anicteric, non-injected sclerae; oropharynx without ulcers, erythema, or exudate. Moist mucous membranes. Lymphatic: No cervical or axillary lymphadenopathy. Cardiovascular: S1, S2,no R/M/G  Pulmonary: CTA b/l. No wheezes/rales/rhonchi. Abdominal: Soft,NTND, + BS. Skin: No rash, nodules, or periungual changes. Neuro: Alert; able to carry normal conversation    Musculoskeletal:   B/l CMC squaring noted  Right wrist with a fusion s/p injury  No synovitis    Joint Count 7/26/2019 4/26/2019 3/27/2019   Patient pain (0-100) 0 0 75   MHAQ 0 0 0.25   Left elbow - Tender 0 - -   Left elbow - Swollen 0 - -   Left 4th MCP - Tender - 0 -   Left 4th MCP - Swollen - 0 -   Left thumb IP - Swollen - - 0   Left 3rd PIP - Tender - - 0   Right wrist- Tender - - 1   Right wrist- Swollen - - 1   Tender Joint Count (Total) 0 0 1   Swollen Joint Count (Total) 0 0 1   Physician Assessment (0-10) 0 0 4   Patient Assessment (0-10) 0 0 5   CDAI Total (calculated) 0 0 11       DATA REVIEW    Prior medical records were reviewed and if applicable are summarized as below:    Labs:   5/2019: BMP normal  3/2019: CCP, RF positive, ESR, CRP normal, Hep B, C, quant gold negative  2/2019: cbc, CMP unremarkable    Imaging:   Hand X-rays (3/2019): Personally reviewed images. + erosions consistent with RA and severe OA  Foot x-rays (3/2019): Personally reviewed images. + erosions  DEXA (3/2019): T score -2.6 at the left radius    ASSESSMENT AND PLAN    A 71 y.o. female with hx of osteoporosis on fosamax, seropositive erosive rheumatoid arthritis on methotrexate 20 mg oral weekly presents for a follow up visit. Patient has joint damage but she appears to have erosive osteoarthritis as well and large part of damage is from erosive OA.  She is doing well on current medication regimen and her disease is in remission. # Seropositive erosive Rheumatoid arthritis:  - methotrexateTake 20 mg oral weekly  - daily folic acid. - If need to, will add xeljanz at next visit. # Osteoporosis:  - s/p Prolia in 5/2019     # Hand osteoarthritis: largely asymptomatic. Erosive osteoarthritis. - will continue to monitor     # Medication Toxicity Monitoring:  - cbc, cmp ordered today  - Hepatitis B, C: negative 3/2019  - Quant gold: negative 3/2019  - Immunizations: We discussed receiving influenza, Prevar-13, and Pneumovax-23 vaccines as per the CDC guidelines for immunosuppressed patients. - bone health: see above    The patient voiced understanding of the aforementioned assessment and plan. Summary of plan was provided in the After Visit Summary patient instructions. I also provided education about MyChart setup and utility. Ms. Feliciano Paiz has a reminder for a \"due or due soon\" health maintenance. I have asked that she contact her primary care provider for follow-up on this health maintenance.     TODAY'S ORDERS    Orders Placed This Encounter    CBC WITH AUTOMATED DIFF    METABOLIC PANEL, COMPREHENSIVE       Future Appointments   Date Time Provider Catherine Cisnerosi   10/25/2019  9:40 AM Tonia Marcelino MD [facility]   11/11/2019 11:00 AM [facility]    Adult Rheumatology   [facility]  A Part of [facility], [address] [place] [address]   Phone [phone]  Fax [phone]

## 2019-07-26 NOTE — PROGRESS NOTES
Chief Complaint   Patient presents with    Arthritis     1. Have you been to the ER, urgent care clinic since your last visit? Hospitalized since your last visit? No    2. Have you seen or consulted any other health care providers outside of the [address] [place] [address] since your last visit? Include any pap smears or colon screening.  No

## 2019-07-27 LAB
ALBUMIN SERPL-MCNC: 3.8 G/DL (ref 3.6–4.8)
ALBUMIN/GLOB SERPL: 1.4 {RATIO} (ref 1.2–2.2)
ALP SERPL-CCNC: 51 IU/L (ref 39–117)
ALT SERPL-CCNC: 28 IU/L (ref 0–32)
AST SERPL-CCNC: 29 IU/L (ref 0–40)
BASOPHILS # BLD AUTO: 0 X10E3/UL (ref 0–0.2)
BASOPHILS NFR BLD AUTO: 0 %
BILIRUB SERPL-MCNC: 0.4 MG/DL (ref 0–1.2)
BUN SERPL-MCNC: 15 MG/DL (ref 8–27)
BUN/CREAT SERPL: 19 (ref 12–28)
CALCIUM SERPL-MCNC: 9.7 MG/DL (ref 8.7–10.3)
CHLORIDE SERPL-SCNC: 104 MMOL/L (ref 96–106)
CO2 SERPL-SCNC: 24 MMOL/L (ref 20–29)
CREAT SERPL-MCNC: 0.81 MG/DL (ref 0.57–1)
EOSINOPHIL # BLD AUTO: 0.3 X10E3/UL (ref 0–0.4)
EOSINOPHIL NFR BLD AUTO: 4 %
ERYTHROCYTE [DISTWIDTH] IN BLOOD BY AUTOMATED COUNT: 13.5 % (ref 12.3–15.4)
GLOBULIN SER CALC-MCNC: 2.8 G/DL (ref 1.5–4.5)
GLUCOSE SERPL-MCNC: 103 MG/DL (ref 65–99)
HCT VFR BLD AUTO: 37.4 % (ref 34–46.6)
HGB BLD-MCNC: 12.4 G/DL (ref 11.1–15.9)
IMM GRANULOCYTES # BLD AUTO: 0 X10E3/UL (ref 0–0.1)
IMM GRANULOCYTES NFR BLD AUTO: 0 %
LYMPHOCYTES # BLD AUTO: 2.2 X10E3/UL (ref 0.7–3.1)
LYMPHOCYTES NFR BLD AUTO: 24 %
MCH RBC QN AUTO: 33.8 PG (ref 26.6–33)
MCHC RBC AUTO-ENTMCNC: 33.2 G/DL (ref 31.5–35.7)
MCV RBC AUTO: 102 FL (ref 79–97)
MONOCYTES # BLD AUTO: 0.8 X10E3/UL (ref 0.1–0.9)
MONOCYTES NFR BLD AUTO: 8 %
NEUTROPHILS # BLD AUTO: 5.9 X10E3/UL (ref 1.4–7)
NEUTROPHILS NFR BLD AUTO: 64 %
PLATELET # BLD AUTO: 377 X10E3/UL (ref 150–450)
POTASSIUM SERPL-SCNC: 4.3 MMOL/L (ref 3.5–5.2)
PROT SERPL-MCNC: 6.6 G/DL (ref 6–8.5)
RBC # BLD AUTO: 3.67 X10E6/UL (ref 3.77–5.28)
SODIUM SERPL-SCNC: 140 MMOL/L (ref 134–144)
WBC # BLD AUTO: 9.2 X10E3/UL (ref 3.4–10.8)

## 2019-10-25 ENCOUNTER — OFFICE VISIT (OUTPATIENT)
Dept: RHEUMATOLOGY | Age: 64
End: 2019-10-25

## 2019-10-25 ENCOUNTER — HOSPITAL ENCOUNTER (OUTPATIENT)
Dept: LAB | Age: 64
Discharge: HOME | End: 2019-10-25
Payer: MEDICARE

## 2019-10-25 VITALS
HEART RATE: 66 BPM | DIASTOLIC BLOOD PRESSURE: 83 MMHG | OXYGEN SATURATION: 93 % | BODY MASS INDEX: 28.13 KG/M2 | HEIGHT: 61 IN | SYSTOLIC BLOOD PRESSURE: 133 MMHG | TEMPERATURE: 98.3 F | WEIGHT: 149 LBS | RESPIRATION RATE: 20 BRPM

## 2019-10-25 DIAGNOSIS — M19.042 PRIMARY OSTEOARTHRITIS OF BOTH HANDS: ICD-10-CM

## 2019-10-25 DIAGNOSIS — M81.0 OSTEOPOROSIS, UNSPECIFIED OSTEOPOROSIS TYPE, UNSPECIFIED PATHOLOGICAL FRACTURE PRESENCE: ICD-10-CM

## 2019-10-25 DIAGNOSIS — Z79.60 LONG-TERM USE OF IMMUNOSUPPRESSANT MEDICATION: ICD-10-CM

## 2019-10-25 DIAGNOSIS — M19.041 PRIMARY OSTEOARTHRITIS OF BOTH HANDS: ICD-10-CM

## 2019-10-25 DIAGNOSIS — M05.9 SEROPOSITIVE RHEUMATOID ARTHRITIS (HCC): Primary | ICD-10-CM

## 2019-10-25 PROCEDURE — 80053 COMPREHEN METABOLIC PANEL: CPT

## 2019-10-25 PROCEDURE — 85025 COMPLETE CBC W/AUTO DIFF WBC: CPT

## 2019-10-25 RX ORDER — METHOTREXATE 2.5 MG/1
20 TABLET ORAL
Qty: 40 TAB | Refills: 3 | Status: SHIPPED | OUTPATIENT
Start: 2019-10-26 | End: 2020-01-27 | Stop reason: SINTOL

## 2019-10-25 NOTE — PROGRESS NOTES
Chief Complaint   Patient presents with    Arthritis     1. Have you been to the ER, urgent care clinic since your last visit? Hospitalized since your last visit? Yes Where: \"[facility] [place] for a [place] [facility]. \"    2. Have you seen or consulted any other health care providers outside of the [address] [place] [address] since your last visit? Include any pap smears or colon screening.  No

## 2019-10-25 NOTE — PROGRESS NOTES
REASON FOR VISIT    Patient presents for a follow up visit. HISTORY OF DISEASE: Seropositive Rheumatoid Arthritis; erosive    Year of diagnosis: 2013  First visit with me:  3/2019  Cumulative disease manifestations:   Positive serologies/labs: RF, CCP  Negative serologies/labs: Other co-morbidities:  osteoporosis  Relapses: 12/2018 but on not treatment     Past treatment history:  Prednisone:   Non-biologic DMARD: Methotrexate 20 mg oral weekly (2013-present)  Biologic:  Other: Prolia 5/2019     HISTORY OF PRESENT ILLNESS     MHAQ: 0  Pain scale: 0  The patient is doing well. No joint pain, swelling and stiffness. She is doing folic acid as well. She got the flu shot already this year. REVIEW OF SYSTEMS    A comprehensive review of systems was performed and pertinent results are documented in the HPI, review of systems is otherwise non-contributory. PAST MEDICAL HISTORY    Past Medical History:   Diagnosis Date    Osteoporosis     Rheumatoid arthritis ([address]         History reviewed. No pertinent surgical history. FAMILY HISTORY    Family History   Problem Relation Age of Onset    No Known Problems Mother     No Known Problems Father        SOCIAL HISTORY    Social History     Tobacco Use    Smoking status: Former Smoker    Smokeless tobacco: Never Used    Tobacco comment: Quit 3 Years Ago   Substance Use Topics    Alcohol use: Yes     Comment: Social    Drug use: No       MEDICATIONS    Current Outpatient Medications   Medication Sig Dispense Refill    [START ON 10/26/2019] methotrexate (RHEUMATREX) 2.5 mg tablet Take 8 Tabs by mouth Every Saturday. 40 Tab 3    calcium carbonate-vitamin D3 (CALTRATE 600 + D) 600 mg (1,500 mg)-800 unit chew Take 2 Tabs by mouth daily.  545 Tab 3    folic acid (FOLVITE) 1 mg tablet TK 1 T PO DAILY  0       ALLERGIES    No Known Allergies    PHYSICAL EXAMINATION    Visit Vitals  /83 (BP 1 Location: Left arm, BP Patient Position: Sitting)   Pulse 66 Temp 98.3 °F (36.8 °C) (Oral)   Resp 20   Ht 5' 1\" (1.549 m)   Wt 149 lb (67.6 kg)   SpO2 93%   BMI 28.15 kg/m²     Body mass index is 28.15 kg/m². General: NAD  HEENT: PERRL, anicteric, non-injected sclerae; oropharynx without ulcers, erythema, or exudate. Moist mucous membranes. Lymphatic: No cervical or axillary lymphadenopathy. Cardiovascular: S1, S2,no R/M/G  Pulmonary: CTA b/l. No wheezes/rales/rhonchi. Abdominal: Soft,NTND, + BS. Skin: No rash, nodules, or periungual changes. Neuro: Alert; able to carry normal conversation    Musculoskeletal:   B/l CMC squaring noted  No synovitis    Joint Count 7/26/2019 4/26/2019 3/27/2019   Patient pain (0-100) 0 0 75   MHAQ 0 0 0.25   Left elbow - Tender 0 - -   Left elbow - Swollen 0 - -   Left 4th MCP - Tender - 0 -   Left 4th MCP - Swollen - 0 -   Left thumb IP - Swollen - - 0   Left 3rd PIP - Tender - - 0   Right wrist- Tender - - 1   Right wrist- Swollen - - 1   Tender Joint Count (Total) 0 0 1   Swollen Joint Count (Total) 0 0 1   Physician Assessment (0-10) 0 0 4   Patient Assessment (0-10) 0 0 5   CDAI Total (calculated) 0 0 11       DATA REVIEW    Prior medical records were reviewed and if applicable are summarized as below:    Labs:   7/2019: Cbc, cmp unremarkable  5/2019: BMP normal  3/2019: CCP, RF positive, ESR, CRP normal, Hep B, C, quant gold negative  2/2019: cbc, CMP unremarkable    Imaging:   Hand X-rays (3/2019): Personally reviewed images. + erosions consistent with RA and severe OA  Foot x-rays (3/2019): Personally reviewed images. + erosions  DEXA (3/2019): T score -2.6 at the left radius    ASSESSMENT AND PLAN    A 71 y.o. female with hx of osteoporosis on fosamax, seropositive erosive rheumatoid arthritis on methotrexate 20 mg oral weekly presents for a follow up visit. Patient has joint damage but she appears to have erosive osteoarthritis as well and large part of damage is from erosive OA.  She is doing well on current medication regimen and her disease is in remission. # Seropositive erosive Rheumatoid arthritis:  - methotrexateTake 20 mg oral weekly  - daily folic acid. - If need to, will add xeljanz at next visit. # Osteoporosis:  - s/p Prolia in 5/2019     # Hand osteoarthritis: largely asymptomatic. Erosive osteoarthritis. - will continue to monitor     # Medication Toxicity Monitoring:  - cbc, cmp ordered today  - Hepatitis B, C: negative 3/2019  - Quant gold: negative 3/2019  - Immunizations: We discussed receiving influenza, Prevar-13, and Pneumovax-23 vaccines as per the CDC guidelines for immunosuppressed patients. - bone health: see above    The patient voiced understanding of the aforementioned assessment and plan. Summary of plan was provided in the After Visit Summary patient instructions. I also provided education about MyChart setup and utility. Ms. Claudene Maid has a reminder for a \"due or due soon\" health maintenance. I have asked that she contact her primary care provider for follow-up on this health maintenance.     TODAY'S ORDERS    Orders Placed This Encounter    CBC WITH AUTOMATED DIFF    METABOLIC PANEL, COMPREHENSIVE    methotrexate (RHEUMATREX) 2.5 mg tablet       Future Appointments   Date Time Provider Catherine Reynaga   11/11/2019 11:00 AM SS INF4 CH4 <1H [facility]   11/15/2019  1:00 PM Constantin Snider MD [facility] SCHED   1/27/2020  9:20 AM Carroll Hartmann MD [facility]       Juan C Cooper MD    Adult Rheumatology   [facility]  A Part of [address], [address]   Phone [phone]  Fax [phone]

## 2019-10-25 NOTE — PATIENT INSTRUCTIONS
Please fill out your [address] you will receive after your visit in the mail or via [address]

## 2019-10-26 LAB
ALBUMIN SERPL-MCNC: 3.6 G/DL (ref 3.6–4.8)
ALBUMIN/GLOB SERPL: 1.3 {RATIO} (ref 1.2–2.2)
ALP SERPL-CCNC: 62 IU/L (ref 39–117)
ALT SERPL-CCNC: 32 IU/L (ref 0–32)
AST SERPL-CCNC: 24 IU/L (ref 0–40)
BASOPHILS # BLD AUTO: 0.1 X10E3/UL (ref 0–0.2)
BASOPHILS NFR BLD AUTO: 1 %
BILIRUB SERPL-MCNC: 0.3 MG/DL (ref 0–1.2)
BUN SERPL-MCNC: 14 MG/DL (ref 8–27)
BUN/CREAT SERPL: 17 (ref 12–28)
CALCIUM SERPL-MCNC: 10.5 MG/DL (ref 8.7–10.3)
CHLORIDE SERPL-SCNC: 103 MMOL/L (ref 96–106)
CO2 SERPL-SCNC: 21 MMOL/L (ref 20–29)
CREAT SERPL-MCNC: 0.81 MG/DL (ref 0.57–1)
EOSINOPHIL # BLD AUTO: 0.4 X10E3/UL (ref 0–0.4)
EOSINOPHIL NFR BLD AUTO: 2 %
ERYTHROCYTE [DISTWIDTH] IN BLOOD BY AUTOMATED COUNT: 11.7 % (ref 12.3–15.4)
GLOBULIN SER CALC-MCNC: 2.8 G/DL (ref 1.5–4.5)
GLUCOSE SERPL-MCNC: 84 MG/DL (ref 65–99)
HCT VFR BLD AUTO: 38.7 % (ref 34–46.6)
HGB BLD-MCNC: 12.6 G/DL (ref 11.1–15.9)
IMM GRANULOCYTES # BLD AUTO: 0.2 X10E3/UL (ref 0–0.1)
IMM GRANULOCYTES NFR BLD AUTO: 1 %
LYMPHOCYTES # BLD AUTO: 4.6 X10E3/UL (ref 0.7–3.1)
LYMPHOCYTES NFR BLD AUTO: 28 %
MCH RBC QN AUTO: 31.7 PG (ref 26.6–33)
MCHC RBC AUTO-ENTMCNC: 32.6 G/DL (ref 31.5–35.7)
MCV RBC AUTO: 98 FL (ref 79–97)
MONOCYTES # BLD AUTO: 1.3 X10E3/UL (ref 0.1–0.9)
MONOCYTES NFR BLD AUTO: 8 %
NEUTROPHILS # BLD AUTO: 10 X10E3/UL (ref 1.4–7)
NEUTROPHILS NFR BLD AUTO: 60 %
PLATELET # BLD AUTO: 499 X10E3/UL (ref 150–450)
POTASSIUM SERPL-SCNC: 4.1 MMOL/L (ref 3.5–5.2)
PROT SERPL-MCNC: 6.4 G/DL (ref 6–8.5)
RBC # BLD AUTO: 3.97 X10E6/UL (ref 3.77–5.28)
SODIUM SERPL-SCNC: 140 MMOL/L (ref 134–144)
WBC # BLD AUTO: 16.5 X10E3/UL (ref 3.4–10.8)

## 2019-10-30 ENCOUNTER — OFFICE VISIT (OUTPATIENT)
Dept: FAMILY MEDICINE CLINIC | Age: 64
End: 2019-10-30

## 2019-10-30 VITALS
BODY MASS INDEX: 27.56 KG/M2 | OXYGEN SATURATION: 96 % | SYSTOLIC BLOOD PRESSURE: 115 MMHG | HEART RATE: 71 BPM | DIASTOLIC BLOOD PRESSURE: 77 MMHG | WEIGHT: 146 LBS | HEIGHT: 61 IN | RESPIRATION RATE: 20 BRPM | TEMPERATURE: 98.5 F

## 2019-10-30 DIAGNOSIS — J81.0 ACUTE PULMONARY EDEMA (HCC): ICD-10-CM

## 2019-10-30 DIAGNOSIS — R93.89 ABNORMAL CXR: Primary | ICD-10-CM

## 2019-10-30 DIAGNOSIS — J84.10 LUNG FIBROSIS (HCC): ICD-10-CM

## 2019-10-30 NOTE — PROGRESS NOTES
72 yo female with rheumatoid arthritis    Here for F/U of abnormal chest xray    Had chest xray done at urgent care in [facility]    Pt without sxs today    Resident plans to obtain echo and referral to pulmonology    I reviewed with the resident the medical history and the resident's findings on the physical examination. I discussed with the resident the patient's diagnosis and concur with the plan.

## 2019-10-30 NOTE — PROGRESS NOTES
Progress Note    Patient: Jose Prasad MRN: [id number]  SSN: [id number]    YOB: 1955  Age: 71 y.o. Sex: female      Chief Complaint   Patient presents with    Abnormal Chest X Ray     follow up from recent urgent care visit - abnormal cxr     Subjective:     Problems addressed:  Encounter Diagnoses     ICD-10-CM ICD-9-CM   1. Abnormal CXR R93.89 793.2   2. Lung fibrosis ([address] J84.10 5   Pt is a 72 y/o F with RA, OA who presents to the clinic due to an abnormal CXR. Currently on prolia for OA and methotrexate for RA. Pt was recently seen at urgent care in [facility] for cough and had a CXR: impression states \"CHF with either moderate chronic interstitial edema or superimposed interstitial lung fibrosis. \"  Pt reports that she is asymptomatic at this time and the cough has resolved. Pt denies any symptoms of Heart failure and denies hx of Heart failure. Specifically denies dyspnea with exertion, LE edema and orthopnea. Pt denies fever, chills, HA/dizziness, SOB, CP, palpitations, Abd pain, N/V/D and constipation. Current and past medical information:    Current Medications after this visit[de-identified]     Current Outpatient Medications   Medication Sig    denosumab (PROLIA) 60 mg/mL injection 60 mg by SubCUTAneous route once. Indications: patient gets this injection every 6 months    methotrexate (RHEUMATREX) 2.5 mg tablet Take 8 Tabs by mouth Every Saturday.  calcium carbonate-vitamin D3 (CALTRATE 600 + D) 600 mg (1,500 mg)-800 unit chew Take 2 Tabs by mouth daily.  folic acid (FOLVITE) 1 mg tablet TK 1 T PO DAILY     No current facility-administered medications for this visit. 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     Social History     Socioeconomic History    Marital status:      Spouse name: Not on file    Number of children: Not on file    Years of education: Not on file    Highest education level: Not on file   Tobacco Use    Smoking status: Former Smoker    Smokeless tobacco: Never Used    Tobacco comment: Quit 3 Years Ago   Substance and Sexual Activity    Alcohol use: Yes     Comment: Social    Drug use: No    Sexual activity: Never     ROS : See HPI    Objective:     Vitals:    10/30/19 0816   BP: 115/77   Pulse: 71   Resp: 20   Temp: 98.5 °F (36.9 °C)   TempSrc: Oral   SpO2: 96%   Weight: 146 lb (66.2 kg)   Height: 5' 1\" (1.549 m)      Body mass index is 27.59 kg/m². Physical Exam   Constitutional: She appears well-developed and well-nourished. No distress. HENT:   Head: Normocephalic and atraumatic. Cardiovascular: Normal rate, regular rhythm, normal heart sounds and intact distal pulses. Exam reveals no gallop and no friction rub. No murmur heard. No JVD. Pulmonary/Chest: Effort normal and breath sounds normal. No respiratory distress. She has no wheezes. She has no rales. She exhibits no tenderness. Musculoskeletal:   No LE edema   Skin: She is not diaphoretic. Psychiatric: She has a normal mood and affect. Health Maintenance Due   Topic Date Due    DTaP/Tdap/Td series (1 - Tdap) 07/26/1975    Shingrix Vaccine Age 50> (1 of 2) 07/26/2004    BREAST CANCER SCRN MAMMOGRAM  07/26/2004    FOBT Q 1 YEAR AGE 50-75  07/26/2004    GLAUCOMA SCREENING Q2Y  07/26/2019    Pneumococcal 65+ years (1 of 2 - PCV13) 07/26/2019    MEDICARE YEARLY EXAM  10/11/2019     Assessment and orders:     Encounter Diagnoses     ICD-10-CM ICD-9-CM   1. Abnormal CXR R93.89 793.2   2. Lung fibrosis ([address] J84.10 515   3. Acute pulmonary edema (HCC)  J81.0 518.4     1. Abnormal CXR: recent CXR was concerning for CHF given interstitial edema and also showed superimposed interstitial lung fibrosis. No Hx of HF, Pt asymptomatic and no concerns on exam today.  Will get ECHO to rule out any systolic or diastolic dysfunction. Lung fibrosis likely 2/2 methotrexate.   - REFERRAL TO PULMONARY DISEASE for PFTs  - ECHO ADULT COMPLETE; Future  - Will consider cardiology referral after ECHO. - Advised Pt to return to clinic/ go to ER if she has symptoms or HF or dyspnea. - f/u after ECHO    Plan of care:  Discussed diagnoses in detail with patient. Medication risks/benefits/side effects discussed with patient. All of the patient's questions were addressed. The patient understands and agrees with our plan of care. The patient knows to call back if they are unsure of or forget any changes we discussed today or if the symptoms change. The patient received an After-Visit Summary which contains VS, orders, medication list and allergy list. This can be used as a \"mini-medical record\" should they have to seek medical care while out of town. Future Appointments   Date Time Provider Catherine Reynaga   11/11/2019 11:00 AM SS INF4 CH4 <1H [facility]   1/27/2020  9:20 AM Alexis Hart MD [facility]       Signed By: Sixto Sow MD     October 30, 2019      Pt discussed with Dr. Neil Bowman.

## 2019-10-30 NOTE — PROGRESS NOTES
Identified Patient with two Patient identifiers (Name and ). Two Patient Identifiers confirmed. Reviewed record in preparation for visit and have obtained necessary documentation. Chief Complaint   Patient presents with    Abnormal Chest X Ray     follow up from recent urgent care visit - abnormal cxr       Visit Vitals  /77 (BP 1 Location: Right arm, BP Patient Position: Sitting)   Pulse 71   Temp 98.5 °F (36.9 °C) (Oral)   Resp 20   Ht 5' 1\" (1.549 m)   Wt 146 lb (66.2 kg)   SpO2 96%   BMI 27.59 kg/m²       1. Have you been to the ER, urgent care clinic since your last visit? Hospitalized since your last visit? Yes, Patient was seen for cough at an urgent care in [place].    2. Have you seen or consulted any other health care providers outside of the [address] [place] [address] since your last visit? Include any pap smears or colon screening. Yes, see above.

## 2019-11-04 ENCOUNTER — HOSPITAL ENCOUNTER (OUTPATIENT)
Dept: NON INVASIVE DIAGNOSTICS | Age: 64
Discharge: HOME | End: 2019-11-04
Attending: FAMILY MEDICINE
Payer: MEDICARE

## 2019-11-04 VITALS
DIASTOLIC BLOOD PRESSURE: 81 MMHG | SYSTOLIC BLOOD PRESSURE: 113 MMHG | HEIGHT: 61 IN | WEIGHT: 146 LBS | BODY MASS INDEX: 27.56 KG/M2

## 2019-11-04 DIAGNOSIS — J81.0 ACUTE PULMONARY EDEMA (HCC): ICD-10-CM

## 2019-11-04 LAB
AV VELOCITY RATIO: 0.79
ECHO AO ROOT DIAM: 3.47 CM
ECHO AV AREA PEAK VELOCITY: 2.1 CM2
ECHO AV PEAK GRADIENT: 8.3 MMHG
ECHO AV PEAK VELOCITY: 144.44 CM/S
ECHO EST RA PRESSURE: 3 MMHG
ECHO LA MAJOR AXIS: 2.71 CM
ECHO LA TO AORTIC ROOT RATIO: 0.78
ECHO LA VOL 2C: 37.64 ML (ref 22–52)
ECHO LA VOL 4C: 35.56 ML (ref 22–52)
ECHO LA VOL BP: 43.47 ML (ref 22–52)
ECHO LA VOL/BSA BIPLANE: 26.3 ML/M2 (ref 16–28)
ECHO LA VOLUME INDEX A2C: 22.78 ML/M2 (ref 16–28)
ECHO LA VOLUME INDEX A4C: 21.52 ML/M2 (ref 16–28)
ECHO LV INTERNAL DIMENSION DIASTOLIC: 4.68 CM (ref 3.9–5.3)
ECHO LV INTERNAL DIMENSION SYSTOLIC: 3.24 CM
ECHO LV IVSD: 1.04 CM (ref 0.6–0.9)
ECHO LV MASS 2D: 173.7 G (ref 67–162)
ECHO LV MASS INDEX 2D: 105.1 G/M2 (ref 43–95)
ECHO LV POSTERIOR WALL DIASTOLIC: 0.84 CM (ref 0.6–0.9)
ECHO LVOT DIAM: 1.86 CM
ECHO LVOT PEAK GRADIENT: 5.2 MMHG
ECHO LVOT PEAK VELOCITY: 113.68 CM/S
ECHO MV A VELOCITY: 81.74 CM/S
ECHO MV E DECELERATION TIME (DT): 205.1 MS
ECHO MV E VELOCITY: 64.92 CM/S
ECHO MV E/A RATIO: 0.79
ECHO MV REGURGITANT PEAK GRADIENT: 55.1 MMHG
ECHO MV REGURGITANT PEAK VELOCITY: 371.18 CM/S
ECHO PULMONARY ARTERY SYSTOLIC PRESSURE (PASP): 26.8 MMHG
ECHO PV MAX VELOCITY: 86.83 CM/S
ECHO PV PEAK GRADIENT: 3 MMHG
ECHO RIGHT VENTRICULAR SYSTOLIC PRESSURE (RVSP): 26.8 MMHG
ECHO RV INTERNAL DIMENSION: 3.56 CM
ECHO TV REGURGITANT MAX VELOCITY: 244.16 CM/S
ECHO TV REGURGITANT PEAK GRADIENT: 23.8 MMHG
LVFS 2D: 30.86 %
MV DEC SLOPE: 3.17
PULMONARY ARTERY END DIASTOLIC PRESSURE: 9.4 MMHG
PULMONARY ARTERY MEAN PRESURE: 15.2 MMHG
PV END DIASTOLIC VELOCITY: 1.3 MMHG

## 2019-11-04 PROCEDURE — 93306 TTE W/DOPPLER COMPLETE: CPT

## 2019-11-11 ENCOUNTER — HOSPITAL ENCOUNTER (OUTPATIENT)
Dept: INFUSION THERAPY | Age: 64
Discharge: HOME | End: 2019-11-11
Payer: MEDICARE

## 2019-11-11 VITALS
HEART RATE: 74 BPM | RESPIRATION RATE: 18 BRPM | SYSTOLIC BLOOD PRESSURE: 122 MMHG | TEMPERATURE: 97.8 F | OXYGEN SATURATION: 98 % | DIASTOLIC BLOOD PRESSURE: 72 MMHG

## 2019-11-11 LAB
MAGNESIUM SERPL-MCNC: 2 MG/DL (ref 1.6–2.4)
PHOSPHATE SERPL-MCNC: 3.8 MG/DL (ref 2.6–4.7)

## 2019-11-11 PROCEDURE — 74011250636 HC RX REV CODE- 250/636: Performed by: INTERNAL MEDICINE

## 2019-11-11 PROCEDURE — 36415 COLL VENOUS BLD VENIPUNCTURE: CPT

## 2019-11-11 PROCEDURE — 84100 ASSAY OF PHOSPHORUS: CPT

## 2019-11-11 PROCEDURE — 96372 THER/PROPH/DIAG INJ SC/IM: CPT

## 2019-11-11 PROCEDURE — 83735 ASSAY OF MAGNESIUM: CPT

## 2019-11-11 RX ADMIN — DENOSUMAB 60 MG: 60 INJECTION SUBCUTANEOUS at 11:14

## 2019-11-11 NOTE — PROGRESS NOTES
[facility] Short Visit Progress Note    7257  Patient admitted to [facility] for Prolia injection/ lab draw ambulatory in stable condition. Assessment completed. No new concerns voiced. Vital Signs:  Visit Vitals  /72   Pulse 74   Temp 97.8 °F (36.6 °C)   Resp 18   SpO2 98%     Lab Results:  Recent Results (from the past 12 hour(s))   MAGNESIUM    Collection Time: 11/11/19 11:11 AM   Result Value Ref Range    Magnesium 2.0 1.6 - 2.4 mg/dL   PHOSPHORUS    Collection Time: 11/11/19 11:11 AM   Result Value Ref Range    Phosphorus 3.8 2.6 - 4.7 MG/DL       Medications:  Medications Administered     denosumab (PROLIA) injection 60 mg     Admin Date  11/11/2019 Action  Given Dose  60 mg Route  SubCUTAneous Administered By  Shawanda Wong RN            Injection given in RACHEL. 1120 Patient tolerated treatment well. Patient discharged from [place] [address] ambulatory in no distress . Patient aware of next appointment on 11/21/19.     Kami Hummel RN    Future Appointments   Date Time Provider Catherine Cisnerosi   11/21/2019  3:00 PM [facility]   1/27/2020  9:20 AM Judith Ling MD [facility]   5/11/2020 11:00 AM SS INF4 CH4 <1H [address]

## 2019-11-21 ENCOUNTER — HOSPITAL ENCOUNTER (OUTPATIENT)
Dept: INFUSION THERAPY | Age: 64
Discharge: HOME | End: 2019-11-21
Payer: MEDICARE

## 2019-11-21 VITALS
HEART RATE: 87 BPM | DIASTOLIC BLOOD PRESSURE: 84 MMHG | OXYGEN SATURATION: 99 % | RESPIRATION RATE: 18 BRPM | SYSTOLIC BLOOD PRESSURE: 132 MMHG | TEMPERATURE: 97.8 F

## 2019-11-21 LAB
ALBUMIN SERPL-MCNC: 3.3 G/DL (ref 3.5–5)
ANION GAP SERPL CALC-SCNC: 7 MMOL/L (ref 5–15)
BUN SERPL-MCNC: 13 MG/DL (ref 6–20)
BUN/CREAT SERPL: 16 (ref 12–20)
CALCIUM SERPL-MCNC: 9.7 MG/DL (ref 8.5–10.1)
CHLORIDE SERPL-SCNC: 106 MMOL/L (ref 97–108)
CO2 SERPL-SCNC: 26 MMOL/L (ref 21–32)
CREAT SERPL-MCNC: 0.82 MG/DL (ref 0.55–1.02)
GLUCOSE SERPL-MCNC: 119 MG/DL (ref 65–100)
PHOSPHATE SERPL-MCNC: 2.1 MG/DL (ref 2.6–4.7)
POTASSIUM SERPL-SCNC: 3.6 MMOL/L (ref 3.5–5.1)
SODIUM SERPL-SCNC: 139 MMOL/L (ref 136–145)

## 2019-11-21 PROCEDURE — 36415 COLL VENOUS BLD VENIPUNCTURE: CPT

## 2019-11-21 PROCEDURE — 80069 RENAL FUNCTION PANEL: CPT

## 2019-11-21 NOTE — PROGRESS NOTES
[facility] Lab Visit:    9196  Pt arrived ambulatory and in no distress, labs drawn in left Ac 01 stick  per W . Departed [facility] ambulatory and in no distress. Visit Vitals  /84   Pulse 87   Temp 97.8 °F (36.6 °C)   Resp 18   SpO2 99%       Labs available in CC once resulted.

## 2019-12-02 ENCOUNTER — HOSPITAL ENCOUNTER (OUTPATIENT)
Dept: CT IMAGING | Age: 64
Discharge: HOME | End: 2019-12-02
Attending: INTERNAL MEDICINE
Payer: MEDICARE

## 2019-12-02 DIAGNOSIS — J84.10 PULMONARY FIBROSIS (HCC): ICD-10-CM

## 2019-12-02 PROCEDURE — 71250 CT THORAX DX C-: CPT

## 2020-01-27 ENCOUNTER — OFFICE VISIT (OUTPATIENT)
Dept: RHEUMATOLOGY | Age: 65
End: 2020-01-27

## 2020-01-27 VITALS
DIASTOLIC BLOOD PRESSURE: 84 MMHG | OXYGEN SATURATION: 94 % | HEART RATE: 80 BPM | RESPIRATION RATE: 20 BRPM | HEIGHT: 61 IN | BODY MASS INDEX: 28.62 KG/M2 | WEIGHT: 151.6 LBS | SYSTOLIC BLOOD PRESSURE: 147 MMHG | TEMPERATURE: 98 F

## 2020-01-27 DIAGNOSIS — M81.0 OSTEOPOROSIS, UNSPECIFIED OSTEOPOROSIS TYPE, UNSPECIFIED PATHOLOGICAL FRACTURE PRESENCE: ICD-10-CM

## 2020-01-27 DIAGNOSIS — M19.042 PRIMARY OSTEOARTHRITIS OF BOTH HANDS: ICD-10-CM

## 2020-01-27 DIAGNOSIS — Z79.60 LONG-TERM USE OF IMMUNOSUPPRESSANT MEDICATION: ICD-10-CM

## 2020-01-27 DIAGNOSIS — M05.9 SEROPOSITIVE RHEUMATOID ARTHRITIS (HCC): Primary | ICD-10-CM

## 2020-01-27 DIAGNOSIS — M19.041 PRIMARY OSTEOARTHRITIS OF BOTH HANDS: ICD-10-CM

## 2020-01-27 RX ORDER — LEFLUNOMIDE 20 MG/1
20 TABLET ORAL DAILY
Qty: 90 TAB | Refills: 0 | Status: SHIPPED | OUTPATIENT
Start: 2020-01-27 | End: 2020-04-27 | Stop reason: SDUPTHER

## 2020-01-27 NOTE — PROGRESS NOTES
Chief Complaint   Patient presents with    Arthritis     1. Have you been to the ER, urgent care clinic since your last visit? Hospitalized since your last visit? No    2. Have you seen or consulted any other health care providers outside of the [address] [place] [address] since your last visit? Include any pap smears or colon screening.  No

## 2020-01-27 NOTE — PROGRESS NOTES
REASON FOR VISIT    Patient presents for a follow up visit. HISTORY OF DISEASE: Seropositive Rheumatoid Arthritis; erosive    Year of diagnosis: 2013  First visit with me:  3/2019  Cumulative disease manifestations:   Positive serologies/labs: RF, CCP  Negative serologies/labs: Other co-morbidities:  osteoporosis  Relapses: 12/2018 but on not treatment     Past treatment history:  Prednisone:   Non-biologic DMARD: Methotrexate 20 mg oral weekly (2013-present)  Biologic:  Other: Prolia 11/2019     HISTORY OF PRESENT ILLNESS     MHAQ: 0  Pain scale: 0  She is drinking on occasional saturdays about 1-2 drinks. She thinks she is having hair loss with methotrexate which started recently. Joints are doing well with no pain stiffness, swelling. REVIEW OF SYSTEMS    A comprehensive review of systems was performed and pertinent results are documented in the HPI, review of systems is otherwise non-contributory. PAST MEDICAL HISTORY    Past Medical History:   Diagnosis Date    Osteoporosis     Rheumatoid arthritis ([facility] [address]         History reviewed. No pertinent surgical history. FAMILY HISTORY    Family History   Problem Relation Age of Onset    No Known Problems Mother     No Known Problems Father        SOCIAL HISTORY    Social History     Tobacco Use    Smoking status: Former Smoker    Smokeless tobacco: Never Used    Tobacco comment: Quit 3 Years Ago   Substance Use Topics    Alcohol use: Yes     Comment: Social    Drug use: No       MEDICATIONS    Current Outpatient Medications   Medication Sig Dispense Refill    leflunomide (ARAVA) 20 mg tablet Take 1 Tab by mouth daily. Take half tab daily for 7 days and then one tab daily if tolerated 90 Tab 0    denosumab (PROLIA) 60 mg/mL injection 60 mg by SubCUTAneous route once. Indications: patient gets this injection every 6 months      calcium carbonate-vitamin D3 (CALTRATE 600 + D) 600 mg (1,500 mg)-800 unit chew Take 2 Tabs by mouth daily.  180 Tab 3       ALLERGIES    No Known Allergies    PHYSICAL EXAMINATION    Visit Vitals  /84 (BP 1 Location: Right arm, BP Patient Position: Sitting)   Pulse 80   Temp 98 °F (36.7 °C) (Oral)   Resp 20   Ht 5' 1\" (1.549 m)   Wt 151 lb 9.6 oz (68.8 kg)   SpO2 94%   BMI 28.64 kg/m²     Body mass index is 28.64 kg/m². General: NAD  HEENT: PERRL, anicteric, non-injected sclerae; oropharynx without ulcers, erythema, or exudate. Moist mucous membranes. Lymphatic: No cervical or axillary lymphadenopathy. Cardiovascular: S1, S2,no R/M/G  Pulmonary: CTA b/l. No wheezes/rales/rhonchi. Abdominal: Soft,NTND, + BS. Skin: No rash, nodules, or periungual changes. Neuro: Alert; able to carry normal conversation    Musculoskeletal:   B/l CMC squaring noted    Joint Count 1/27/2020 7/26/2019 4/26/2019 3/27/2019   Patient pain (0-100) 0 0 0 75   MHAQ 0 0 0 0.25   Left elbow - Tender - 0 - -   Left elbow - Swollen 0 0 - -   Left wrist- Tender 0 - - -   Left 4th MCP - Tender - - 0 -   Left 4th MCP - Swollen - - 0 -   Left thumb IP - Swollen - - - 0   Left 3rd PIP - Tender - - - 0   Right wrist- Tender - - - 1   Right wrist- Swollen - - - 1   Tender Joint Count (Total) 0 0 0 1   Swollen Joint Count (Total) 0 0 0 1   Physician Assessment (0-10) 0 0 0 4   Patient Assessment (0-10) 0 0 0 5   CDAI Total (calculated) 0 0 0 11       DATA REVIEW    Prior medical records were reviewed and if applicable are summarized as below:    Labs:   10/2019: cbc with thrombocytosis, elevated PMNs, lymph, monocytes, CMP normal  7/2019: Cbc, cmp unremarkable  5/2019: BMP normal  3/2019: CCP, RF positive, ESR, CRP normal, Hep B, C, quant gold negative  2/2019: cbc, CMP unremarkable    Imaging:   Hand X-rays (3/2019): Personally reviewed images. + erosions consistent with RA and severe OA  Foot x-rays (3/2019): Personally reviewed images.  + erosions  DEXA (3/2019): T score -2.6 at the left radius    ASSESSMENT AND PLAN    A 71 y.o. female with hx of osteoporosis on fosamax, seropositive erosive rheumatoid arthritis on methotrexate 20 mg oral weekly presents for a follow up visit. Patient has joint damage but she appears to have erosive osteoarthritis as well and large part of damage is from erosive OA. She is doing well on current medication regimen and her disease is in remission but she feels she is having hair loss from methotrexate. Given this, will switch her from methotrexate. If hair loss, doesn't improve, can consider switching back to methotrexate. # Seropositive erosive Rheumatoid arthritis:  - d/c methotrexate and folic acid. I discussed initiation with the DMARD Arava (leflunomide). I informed the patient the potential adverse effects, which may include: nausea, vomiting, dyspepsia, diarrhea, oral ulcers, infection, liver function abnormalities, blood count abnormalities, paresthesia, rash, and rarely melanoma and non-melanoma skin cancer. The patient understood these possible adverse effects. I informed the patient of the need for routine CBC and CMP as a measure for long term use of immunosuppressants. I also instructed the patient to avoid ill contacts and the needs for annual influenza vaccines and the pneumonia vaccines. In childbearing patients, pregnancy is contra-indicated on leflunomide due to risk for birth defects. I informed the patient that leflunomide may take 4 to 12 weeks to be effective. I prescribed the patient Arava (leflunomide) 20 mg, and instructed to start with half a tablet (10 mg) daily for 7 days and then increase to a full tablet (20 mg) if she has no side effects, such as diarrhea or upset stomach. The patient will follow up in 4 weeks for laboratory monitoring.  - If need to, will add xeljanz at next visit. # Thrombocytosis; leukocytosis:  - will repeat today to monitor    # Osteoporosis:  - s/p Prolia in 11/2019     # Hand osteoarthritis: largely asymptomatic. Erosive osteoarthritis.    - will continue to monitor     # Medication Toxicity Monitoring:  - cbc, cmp ordered today  - Hepatitis B, C: negative 3/2019  - Quant gold: negative 3/2019  - Immunizations: We discussed receiving influenza, Prevar-13, and Pneumovax-23 vaccines as per the CDC guidelines for immunosuppressed patients. - bone health: see above    The patient voiced understanding of the aforementioned assessment and plan. Summary of plan was provided in the After Visit Summary patient instructions. I also provided education about MyChart setup and utility. Ms. Rondel Leyden has a reminder for a \"due or due soon\" health maintenance. I have asked that she contact her primary care provider for follow-up on this health maintenance.     TODAY'S ORDERS    Orders Placed This Encounter    CBC WITH AUTOMATED DIFF    METABOLIC PANEL, COMPREHENSIVE    leflunomide (ARAVA) 20 mg tablet       Future Appointments   Date Time Provider Catherine Reynaga   4/24/2020  9:20 AM Marcela Oconnell MD [facility]   5/11/2020 11:00 AM SS INF4 CH4 <1H [facility]   6/2/2020  9:00 AM [facility] CT 1 SFMRCT [facility] Rocio Nielsen MD    Adult Rheumatology   [facility]  A Part of [address], [address] [place] [address] Road   Phone [phone]  Fax [phone]

## 2020-01-27 NOTE — PATIENT INSTRUCTIONS
Please fill out your [address] you will receive after your visit in the mail or via [address] Please start taking a Biotin supplement for hair loss.

## 2020-01-28 LAB
ALBUMIN SERPL-MCNC: 3.8 G/DL (ref 3.8–4.8)
ALBUMIN/GLOB SERPL: 1.3 {RATIO} (ref 1.2–2.2)
ALP SERPL-CCNC: 49 IU/L (ref 39–117)
ALT SERPL-CCNC: 25 IU/L (ref 0–32)
AST SERPL-CCNC: 31 IU/L (ref 0–40)
BASOPHILS # BLD AUTO: 0.1 X10E3/UL (ref 0–0.2)
BASOPHILS NFR BLD AUTO: 1 %
BILIRUB SERPL-MCNC: 0.6 MG/DL (ref 0–1.2)
BUN SERPL-MCNC: 9 MG/DL (ref 8–27)
BUN/CREAT SERPL: 12 (ref 12–28)
CALCIUM SERPL-MCNC: 9.6 MG/DL (ref 8.7–10.3)
CHLORIDE SERPL-SCNC: 105 MMOL/L (ref 96–106)
CO2 SERPL-SCNC: 21 MMOL/L (ref 20–29)
CREAT SERPL-MCNC: 0.75 MG/DL (ref 0.57–1)
EOSINOPHIL # BLD AUTO: 0.3 X10E3/UL (ref 0–0.4)
EOSINOPHIL NFR BLD AUTO: 4 %
ERYTHROCYTE [DISTWIDTH] IN BLOOD BY AUTOMATED COUNT: 12 % (ref 11.7–15.4)
GLOBULIN SER CALC-MCNC: 2.9 G/DL (ref 1.5–4.5)
GLUCOSE SERPL-MCNC: 96 MG/DL (ref 65–99)
HCT VFR BLD AUTO: 39.1 % (ref 34–46.6)
HGB BLD-MCNC: 12.9 G/DL (ref 11.1–15.9)
IMM GRANULOCYTES # BLD AUTO: 0 X10E3/UL (ref 0–0.1)
IMM GRANULOCYTES NFR BLD AUTO: 0 %
LYMPHOCYTES # BLD AUTO: 2 X10E3/UL (ref 0.7–3.1)
LYMPHOCYTES NFR BLD AUTO: 26 %
MCH RBC QN AUTO: 32.9 PG (ref 26.6–33)
MCHC RBC AUTO-ENTMCNC: 33 G/DL (ref 31.5–35.7)
MCV RBC AUTO: 100 FL (ref 79–97)
MONOCYTES # BLD AUTO: 0.6 X10E3/UL (ref 0.1–0.9)
MONOCYTES NFR BLD AUTO: 8 %
NEUTROPHILS # BLD AUTO: 4.7 X10E3/UL (ref 1.4–7)
NEUTROPHILS NFR BLD AUTO: 61 %
PLATELET # BLD AUTO: 349 X10E3/UL (ref 150–450)
POTASSIUM SERPL-SCNC: 4.4 MMOL/L (ref 3.5–5.2)
PROT SERPL-MCNC: 6.7 G/DL (ref 6–8.5)
RBC # BLD AUTO: 3.92 X10E6/UL (ref 3.77–5.28)
SODIUM SERPL-SCNC: 141 MMOL/L (ref 134–144)
WBC # BLD AUTO: 7.7 X10E3/UL (ref 3.4–10.8)

## 2020-04-24 ENCOUNTER — VIRTUAL VISIT (OUTPATIENT)
Dept: RHEUMATOLOGY | Age: 65
End: 2020-04-24

## 2020-04-24 DIAGNOSIS — M81.0 OSTEOPOROSIS, UNSPECIFIED OSTEOPOROSIS TYPE, UNSPECIFIED PATHOLOGICAL FRACTURE PRESENCE: ICD-10-CM

## 2020-04-24 DIAGNOSIS — Z79.60 LONG-TERM USE OF IMMUNOSUPPRESSANT MEDICATION: ICD-10-CM

## 2020-04-24 DIAGNOSIS — M05.9 SEROPOSITIVE RHEUMATOID ARTHRITIS (HCC): Primary | ICD-10-CM

## 2020-04-27 RX ORDER — LEFLUNOMIDE 20 MG/1
20 TABLET ORAL DAILY
Qty: 90 TAB | Refills: 0 | Status: SHIPPED | OUTPATIENT
Start: 2020-04-27 | End: 2020-07-20

## 2020-04-27 NOTE — PROGRESS NOTES
REASON FOR VISIT    Cornelio Callejas is a 71 y.o. female who was seen by synchronous (real-time) telephone visit on 4/24/2020. HISTORY OF DISEASE: Seropositive Rheumatoid Arthritis; erosive    Year of diagnosis: 2013  First visit with me:  3/2019  Cumulative disease manifestations:   Positive serologies/labs: RF, CCP  Negative serologies/labs: Other co-morbidities:  osteoporosis  Relapses: 12/2018 but on not treatment     Past treatment history:  Prednisone:   Non-biologic DMARD: Methotrexate 20 mg oral weekly (2013-1/2020; d/blake due to hair loss) Leflunomide 20 mg oral daily (1/2020-present)  Biologic:  Other: Prolia 11/2019     HISTORY OF PRESENT ILLNESS       The patient notes she is doing well. The patient has been staying at home. Her friends and family in  [place] are okay right now. The patient notes her joints are doing okay. She has no complaints. Initially  leflunomide gave her bad diarrhea but once her body got used to it, it resolved. No joint pain, swelling  or stiffness. Biotin gave her a rash so she stopped it. The hair loss is stabilized. Methotrexate had also  made her dizzy. REVIEW OF SYSTEMS    A comprehensive review of systems was performed and pertinent results are documented in the HPI, review of systems is otherwise non-contributory. PAST MEDICAL HISTORY    Past Medical History:   Diagnosis Date    Osteoporosis     Rheumatoid arthritis ([address]         History reviewed. No pertinent surgical history.     FAMILY HISTORY    Family History   Problem Relation Age of Onset    No Known Problems Mother     No Known Problems Father        SOCIAL HISTORY    Social History     Tobacco Use    Smoking status: Former Smoker    Smokeless tobacco: Never Used    Tobacco comment: Quit 3 Years Ago   Substance Use Topics    Alcohol use: Yes     Comment: Social    Drug use: No       MEDICATIONS    Current Outpatient Medications   Medication Sig Dispense Refill    leflunomide (ARAVA) 20 mg tablet Take 1 Tab by mouth daily. Take half tab daily for 7 days and then one tab daily if tolerated 90 Tab 0    denosumab (PROLIA) 60 mg/mL injection 60 mg by SubCUTAneous route once. Indications: patient gets this injection every 6 months      calcium carbonate-vitamin D3 (CALTRATE 600 + D) 600 mg (1,500 mg)-800 unit chew Take 2 Tabs by mouth daily. 180 Tab 3       ALLERGIES    No Known Allergies    PHYSICAL EXAMINATION    Conversing normally on phone    Joint Count 1/27/2020 7/26/2019 4/26/2019 3/27/2019   Patient pain (0-100) 0 0 0 75   MHAQ 0 0 0 0.25   Left elbow - Tender - 0 - -   Left elbow - Swollen 0 0 - -   Left wrist- Tender 0 - - -   Left 4th MCP - Tender - - 0 -   Left 4th MCP - Swollen - - 0 -   Left thumb IP - Swollen - - - 0   Left 3rd PIP - Tender - - - 0   Right wrist- Tender - - - 1   Right wrist- Swollen - - - 1   Tender Joint Count (Total) 0 0 0 1   Swollen Joint Count (Total) 0 0 0 1   Physician Assessment (0-10) 0 0 0 4   Patient Assessment (0-10) 0 0 0 5   CDAI Total (calculated) 0 0 0 11       DATA REVIEW    Prior medical records were reviewed and if applicable are summarized as below:    Labs:   1/2020: Cbc, cmp unremarkable  10/2019: cbc with thrombocytosis, elevated PMNs, lymph, monocytes, CMP normal  7/2019: Cbc, cmp unremarkable  5/2019: BMP normal  3/2019: CCP, RF positive, ESR, CRP normal, Hep B, C, quant gold negative  2/2019: cbc, CMP unremarkable    Imaging:   Hand X-rays (3/2019): Personally reviewed images. + erosions consistent with RA and severe OA  Foot x-rays (3/2019): Personally reviewed images. + erosions  DEXA (3/2019): T score -2.6 at the left radius    ASSESSMENT AND PLAN    A 71 y.o. female with hx of osteoporosis, seropositive erosive rheumatoid arthritis on leflunomide 20 mg oral daily presents for a follow up visit. Patient has joint damage but she appears to have erosive osteoarthritis as well and large part of damage is from erosive OA.  She is doing well on current medication regimen and her disease is in remission. # Seropositive erosive Rheumatoid arthritis:  - continue leflunomide 20 mg oral daily.  - If need to, will add xeljanz at next visit. # Osteoporosis:  - s/p Prolia in 11/2019. Next due in May 2020. Appointment already scheduled    # Hand osteoarthritis: largely asymptomatic. Erosive osteoarthritis. - will continue to monitor     # Medication Toxicity Monitoring:  - cbc, cmp reviewed  - Hepatitis B, C: negative 3/2019  - Quant gold: negative 3/2019  - Immunizations: We discussed receiving influenza, Prevar-13, and Pneumovax-23 vaccines as per the CDC guidelines for immunosuppressed patients. - bone health: see above    RTC in 3 months for blood work in person visit    Time spent in direct patient care:  10 minutes    The patient voiced understanding of the aforementioned assessment and plan. Summary of plan was provided in the After Visit Summary patient instructions. I also provided education about MyChart setup and utility. Ms. Jailyn Castrejon has a reminder for a \"due or due soon\" health maintenance. I have asked that she contact her primary care provider for follow-up on this health maintenance. TODAY'S ORDERS    Orders Placed This Encounter    leflunomide (ARAVA) 20 mg tablet       Future Appointments   Date Time Provider Catherine Reynaga   5/11/2020 11:00 AM  INF4 CH4 <1H [facility]   6/2/2020  9:00 AM [facility] CT 1 SFMRCT [facility]     We discussed the expected course, resolution and complications of the diagnosis(es) in detail. Medication risks, benefits, costs, interactions, and alternatives were discussed as indicated. I advised her to contact the office if her condition worsens, changes or fails to improve as anticipated. She expressed understanding with the diagnosis(es) and plan.        Pursuant to the emergency declaration under the [facility], [address] waiver authority and the Coronavirus Preparedness and Response Supplemental Appropriations Act, this Virtual  Visit was conducted, with patient's consent, to reduce the patient's risk of exposure to COVID-19 and provide continuity of care for an established patient. Services were provided through an audio synchronous discussion virtually to substitute for in-person clinic visit.     Brielle Estrella MD    Adult Rheumatology   [facility]  A Part of [facility], [address] [place] [address]   Phone [phone]  Fax [phone]

## 2020-04-27 NOTE — PATIENT INSTRUCTIONS
Please fill out your [address] you will receive after your visit in the mail or via [phone]

## 2020-05-04 NOTE — PROGRESS NOTES
New/updated order required for patient's upcoming [facility] appointment. Messaged doctor's office on 05/04/20 at 10:56 AM.  Refer to fax documents in pharmacy for further information.

## 2020-05-11 ENCOUNTER — HOSPITAL ENCOUNTER (OUTPATIENT)
Dept: INFUSION THERAPY | Age: 65
Discharge: HOME | End: 2020-05-11
Payer: MEDICARE

## 2020-05-11 VITALS
HEART RATE: 75 BPM | OXYGEN SATURATION: 97 % | TEMPERATURE: 97.5 F | SYSTOLIC BLOOD PRESSURE: 135 MMHG | DIASTOLIC BLOOD PRESSURE: 71 MMHG | RESPIRATION RATE: 18 BRPM

## 2020-05-11 LAB
ALBUMIN SERPL-MCNC: 3.3 G/DL (ref 3.5–5)
ANION GAP SERPL CALC-SCNC: 3 MMOL/L (ref 5–15)
BUN SERPL-MCNC: 12 MG/DL (ref 6–20)
BUN/CREAT SERPL: 15 (ref 12–20)
CALCIUM SERPL-MCNC: 9.3 MG/DL (ref 8.5–10.1)
CHLORIDE SERPL-SCNC: 110 MMOL/L (ref 97–108)
CO2 SERPL-SCNC: 26 MMOL/L (ref 21–32)
CREAT SERPL-MCNC: 0.8 MG/DL (ref 0.55–1.02)
GLUCOSE SERPL-MCNC: 101 MG/DL (ref 65–100)
MAGNESIUM SERPL-MCNC: 2.2 MG/DL (ref 1.6–2.4)
PHOSPHATE SERPL-MCNC: 2.2 MG/DL (ref 2.6–4.7)
POTASSIUM SERPL-SCNC: 4 MMOL/L (ref 3.5–5.1)
SODIUM SERPL-SCNC: 139 MMOL/L (ref 136–145)

## 2020-05-11 PROCEDURE — 74011250636 HC RX REV CODE- 250/636: Performed by: INTERNAL MEDICINE

## 2020-05-11 PROCEDURE — 96372 THER/PROPH/DIAG INJ SC/IM: CPT

## 2020-05-11 PROCEDURE — 83735 ASSAY OF MAGNESIUM: CPT

## 2020-05-11 PROCEDURE — 80069 RENAL FUNCTION PANEL: CPT

## 2020-05-11 PROCEDURE — 36415 COLL VENOUS BLD VENIPUNCTURE: CPT

## 2020-05-11 RX ADMIN — DENOSUMAB 60 MG: 60 INJECTION SUBCUTANEOUS at 12:51

## 2020-05-11 NOTE — PROGRESS NOTES
Outpatient Infusion Center   Visit Progress Note    6376 Patient admitted to [facility] for Prolia/labs ambulatory in stable condition. Assessment completed. No new concerns voiced. Pt denies recent major dental work and denies plans for any. Pt verbalized understanding to notify dentist that she receives Prolia injections. VS  Patient Vitals for the past 12 hrs:   Temp Pulse Resp BP SpO2   05/11/20 1112 97.5 °F (36.4 °C) 75 18 135/71 97 %         Medications:  Medications Administered     denosumab (PROLIA) injection 60 mg     Admin Date  05/11/2020 Action  Given Dose  60 mg Route  SubCUTAneous Administered By  Delmy Xiao RN                  Labs  Recent Results (from the past 12 hour(s))   RENAL FUNCTION PANEL    Collection Time: 05/11/20 11:20 AM   Result Value Ref Range    Sodium 139 136 - 145 mmol/L    Potassium 4.0 3.5 - 5.1 mmol/L    Chloride 110 (H) 97 - 108 mmol/L    CO2 26 21 - 32 mmol/L    Anion gap 3 (L) 5 - 15 mmol/L    Glucose 101 (H) 65 - 100 mg/dL    BUN 12 6 - 20 MG/DL    Creatinine 0.80 0.55 - 1.02 MG/DL    BUN/Creatinine ratio 15 12 - 20      GFR est AA >60 >60 ml/min/1.73m2    GFR est non-AA >60 >60 ml/min/1.73m2    Calcium 9.3 8.5 - 10.1 MG/DL    Phosphorus 2.2 (L) 2.6 - 4.7 MG/DL    Albumin 3.3 (L) 3.5 - 5.0 g/dL   MAGNESIUM    Collection Time: 05/11/20 11:20 AM   Result Value Ref Range    Magnesium 2.2 1.6 - 2.4 mg/dL         1255 Patient tolerated treatment well. Patient discharged from [place] [address] ambulatory in no distress. Patient aware of next appointment on 5/21 at 1400.

## 2020-05-21 ENCOUNTER — HOSPITAL ENCOUNTER (OUTPATIENT)
Dept: INFUSION THERAPY | Age: 65
Discharge: HOME | End: 2020-05-21
Payer: MEDICARE

## 2020-05-21 VITALS
SYSTOLIC BLOOD PRESSURE: 120 MMHG | TEMPERATURE: 97.9 F | HEART RATE: 84 BPM | DIASTOLIC BLOOD PRESSURE: 83 MMHG | OXYGEN SATURATION: 95 % | RESPIRATION RATE: 18 BRPM

## 2020-05-21 LAB
ALBUMIN SERPL-MCNC: 3.4 G/DL (ref 3.5–5)
ANION GAP SERPL CALC-SCNC: 5 MMOL/L (ref 5–15)
BUN SERPL-MCNC: 16 MG/DL (ref 6–20)
BUN/CREAT SERPL: 18 (ref 12–20)
CALCIUM SERPL-MCNC: 9.5 MG/DL (ref 8.5–10.1)
CHLORIDE SERPL-SCNC: 108 MMOL/L (ref 97–108)
CO2 SERPL-SCNC: 26 MMOL/L (ref 21–32)
CREAT SERPL-MCNC: 0.87 MG/DL (ref 0.55–1.02)
GLUCOSE SERPL-MCNC: 106 MG/DL (ref 65–100)
PHOSPHATE SERPL-MCNC: 2.2 MG/DL (ref 2.6–4.7)
POTASSIUM SERPL-SCNC: 4 MMOL/L (ref 3.5–5.1)
SODIUM SERPL-SCNC: 139 MMOL/L (ref 136–145)

## 2020-05-21 PROCEDURE — 80069 RENAL FUNCTION PANEL: CPT

## 2020-05-21 PROCEDURE — 36415 COLL VENOUS BLD VENIPUNCTURE: CPT

## 2020-07-10 ENCOUNTER — TELEPHONE (OUTPATIENT)
Dept: RHEUMATOLOGY | Age: 65
End: 2020-07-10

## 2020-07-10 NOTE — TELEPHONE ENCOUNTER
----- Message from Gil Givens sent at 7/10/2020  1:47 PM EDT -----  Regarding: FW: JEWELS/TELEPHONE  Contact: [phone]    ----- Message -----  From: Isis José  Sent: 7/10/2020   1:38 PM EDT  To: [facility] Office [place]  Subject: JEWELS/TELEPHONE                             General Message/Vendor Call       Patient's first and last name: Pablo Forde   : 1955  ID numbers: #[id number]    Caller's first and last name:   Pablo Forde     Reason for call: Advise if patient can go to the dentist being that she have Prolia injections  Callback required yes/no and why:   Best contact number(s): [phone]  Details to clarify the request: Patient would like to know if she can go to the dentist being that she take Prolia injections.

## 2020-07-24 ENCOUNTER — HOSPITAL ENCOUNTER (OUTPATIENT)
Dept: LAB | Age: 65
Discharge: HOME | End: 2020-07-24
Payer: MEDICARE

## 2020-07-24 ENCOUNTER — OFFICE VISIT (OUTPATIENT)
Dept: RHEUMATOLOGY | Age: 65
End: 2020-07-24

## 2020-07-24 VITALS
SYSTOLIC BLOOD PRESSURE: 117 MMHG | RESPIRATION RATE: 20 BRPM | HEIGHT: 61 IN | DIASTOLIC BLOOD PRESSURE: 85 MMHG | HEART RATE: 83 BPM | WEIGHT: 149.8 LBS | TEMPERATURE: 98 F | OXYGEN SATURATION: 95 % | BODY MASS INDEX: 28.28 KG/M2

## 2020-07-24 DIAGNOSIS — M19.041 PRIMARY OSTEOARTHRITIS OF BOTH HANDS: ICD-10-CM

## 2020-07-24 DIAGNOSIS — M19.042 PRIMARY OSTEOARTHRITIS OF BOTH HANDS: ICD-10-CM

## 2020-07-24 DIAGNOSIS — Z79.60 LONG-TERM USE OF IMMUNOSUPPRESSANT MEDICATION: ICD-10-CM

## 2020-07-24 DIAGNOSIS — M81.0 OSTEOPOROSIS, UNSPECIFIED OSTEOPOROSIS TYPE, UNSPECIFIED PATHOLOGICAL FRACTURE PRESENCE: ICD-10-CM

## 2020-07-24 DIAGNOSIS — M05.9 SEROPOSITIVE RHEUMATOID ARTHRITIS (HCC): Primary | ICD-10-CM

## 2020-07-24 PROCEDURE — 86803 HEPATITIS C AB TEST: CPT

## 2020-07-24 PROCEDURE — 36415 COLL VENOUS BLD VENIPUNCTURE: CPT

## 2020-07-24 PROCEDURE — 85025 COMPLETE CBC W/AUTO DIFF WBC: CPT

## 2020-07-24 PROCEDURE — 86480 TB TEST CELL IMMUN MEASURE: CPT

## 2020-07-24 PROCEDURE — 80053 COMPREHEN METABOLIC PANEL: CPT

## 2020-07-24 RX ORDER — PREDNISONE 10 MG/1
TABLET ORAL
Qty: 40 TAB | Refills: 0 | Status: SHIPPED | OUTPATIENT
Start: 2020-07-24 | End: 2021-07-19

## 2020-07-24 NOTE — PROGRESS NOTES
Chief Complaint   Patient presents with    Arthritis     left hand     1. Have you been to the ER, urgent care clinic since your last visit? Hospitalized since your last visit? No    2. Have you seen or consulted any other health care providers outside of the [address] [place] [address] since your last visit? Include any pap smears or colon screening.  No

## 2020-07-24 NOTE — PROGRESS NOTES
REASON FOR VISIT    Patient presents for a follow up visit for    ICD-10-CM ICD-9-CM    1. Seropositive rheumatoid arthritis ([facility]  M05.9 714.0    2. Long-term use of immunosuppressant medication  Z79.899 V58.69 CBC WITH AUTOMATED DIFF      METABOLIC PANEL, COMPREHENSIVE      CHRONIC HEPATITIS PANEL      QUANTIFERON-TB GOLD PLUS   3. Osteoporosis, unspecified osteoporosis type, unspecified pathological fracture presence  M81.0 733.00    4. Primary osteoarthritis of both hands  M19.041 715.14     M19.042         HISTORY OF DISEASE: Seropositive Rheumatoid Arthritis; erosive    Year of diagnosis: 2013  First visit with me:  3/2019  Cumulative disease manifestations:   Positive serologies/labs: RF, CCP  Negative serologies/labs: Other co-morbidities:  osteoporosis  Relapses: 12/2018 but on not treatment     Past treatment history:  Prednisone:   Non-biologic DMARD: Methotrexate 20 mg oral weekly (2013-1/2020; d/blake due to hair loss) Leflunomide 20 mg oral daily (1/2020-present)  Biologic:  Other: Prolia 11/2019     HISTORY OF PRESENT ILLNESS     The patient notes her left wrist is painful today. It is mild pain but it is there. Voltaren gel helps. No other joints hurt. Last week her right ankle was slightly swollen and painful. No swelling today. No morning stiffness. REVIEW OF SYSTEMS    A comprehensive review of systems was performed and pertinent results are documented in the HPI, review of systems is otherwise non-contributory. PAST MEDICAL HISTORY    Past Medical History:   Diagnosis Date    Osteoporosis     Rheumatoid arthritis ([facility]         History reviewed. No pertinent surgical history.     FAMILY HISTORY    Family History   Problem Relation Age of Onset    No Known Problems Mother     No Known Problems Father        SOCIAL HISTORY    Social History     Tobacco Use    Smoking status: Former Smoker    Smokeless tobacco: Never Used    Tobacco comment: Quit 3 Years Ago   Substance Use Topics    Alcohol use: Yes     Comment: Social    Drug use: No       MEDICATIONS    Current Outpatient Medications   Medication Sig Dispense Refill    predniSONE (DELTASONE) 10 mg tablet Take 2 pills daily for 2 weeks then take 1 pill daily for 1 week then stop 40 Tab 0    leflunomide (ARAVA) 20 mg tablet Take 1 Tab by mouth daily. 90 Tab 1    denosumab (PROLIA) 60 mg/mL injection 60 mg by SubCUTAneous route once. Indications: patient gets this injection every 6 months      calcium carbonate-vitamin D3 (CALTRATE 600 + D) 600 mg (1,500 mg)-800 unit chew Take 2 Tabs by mouth daily. 180 Tab 3       ALLERGIES    No Known Allergies    PHYSICAL EXAMINATION    General: NAD  HEENT: PERRL, anicteric, non-injected sclerae; oropharynx without ulcers, erythema, or exudate. Moist mucous membranes. Lymphatic: No cervical or axillary lymphadenopathy. Cardiovascular: S1, S2,no R/M/G  Pulmonary: CTA b/l. No wheezes/rales/rhonchi. Abdominal: Soft,NTND, + BS. Skin: No rash, nodules, or periungual changes.    Neuro: Alert; able to carry normal conversation     Musculoskeletal:   Left wrist and right ankle with mild swelling and TTP      Joint Count 1/27/2020 7/26/2019 4/26/2019 3/27/2019   Patient pain (0-100) 0 0 0 75   MHAQ 0 0 0 0.25   Left elbow - Tender - 0 - -   Left elbow - Swollen 0 0 - -   Left wrist- Tender 0 - - -   Left 4th MCP - Tender - - 0 -   Left 4th MCP - Swollen - - 0 -   Left thumb IP - Swollen - - - 0   Left 3rd PIP - Tender - - - 0   Right wrist- Tender - - - 1   Right wrist- Swollen - - - 1   Tender Joint Count (Total) 0 0 0 1   Swollen Joint Count (Total) 0 0 0 1   Physician Assessment (0-10) 0 0 0 4   Patient Assessment (0-10) 0 0 0 5   CDAI Total (calculated) 0 0 0 11       DATA REVIEW    Prior medical records were reviewed and if applicable are summarized as below:    Labs:   1/2020: Cbc, cmp unremarkable  10/2019: cbc with thrombocytosis, elevated PMNs, lymph, monocytes, CMP normal  7/2019: Cbc, cmp unremarkable  5/2019: BMP normal  3/2019: CCP, RF positive, ESR, CRP normal, Hep B, C, quant gold negative  2/2019: cbc, CMP unremarkable    Imaging:   Hand X-rays (3/2019): Personally reviewed images. + erosions consistent with RA and severe OA  Foot x-rays (3/2019): Personally reviewed images. + erosions  DEXA (3/2019): T score -2.6 at the left radius    ASSESSMENT AND PLAN    A 71 y.o. female with hx of osteoporosis, seropositive erosive rheumatoid arthritis on leflunomide 20 mg oral daily presents for a follow up visit. Patient has joint damage but she appears to have erosive osteoarthritis as well and large part of damage is from erosive OA. She is in a mild flare today. I will therefore give her some prednisone. # Seropositive erosive Rheumatoid arthritis:  - continue leflunomide 20 mg oral daily.  - If need to, will add xeljanz at next visit. - will do a trial of prednisone: 20 mg daily for 2 weeks followed by 10 mg daily for 1 week then stop  - I counseled the patient on the risk of avascular necrosis from corticosteroids. For each 20 mg of prednisone taken for over a month, the risk of AVN is 5%. It can also lead to weight gain, mood imbalances, osteoporosis, acne etc.     # Osteoporosis:  - s/p Prolia in 4/2020    # Hand osteoarthritis: largely asymptomatic. Erosive osteoarthritis. - will continue to monitor     # Medication Toxicity Monitoring:  - cbc, cmp today  - Hepatitis B, C: negative 3/2019  - Quant gold: negative 3/2019  - Immunizations: We discussed receiving influenza, Prevar-13, and Pneumovax-23 vaccines as per the CDC guidelines for immunosuppressed patients. - bone health: see above    RTC in 3 months    The patient voiced understanding of the aforementioned assessment and plan. Summary of plan was provided in the After Visit Summary patient instructions. I also provided education about MyChart setup and utility.     Ms. Katie Waters has a reminder for a \"due or due soon\" health maintenance. I have asked that she contact her primary care provider for follow-up on this health maintenance.     TODAY'S ORDERS    Orders Placed This Encounter    QUANTIFERON-TB GOLD PLUS    CBC WITH AUTOMATED DIFF    METABOLIC PANEL, COMPREHENSIVE    CHRONIC HEPATITIS PANEL    predniSONE (DELTASONE) 10 mg tablet       Future Appointments   Date Time Provider Catherine Reynaga   11/9/2020 11:00 AM SS INF4 CH4 <1H JOLENE Saldivar MD    Adult Rheumatology   [facility]  A Part of [facility], [address] [place] [address]   Phone [phone]  Fax [phone]

## 2020-07-24 NOTE — PATIENT INSTRUCTIONS
Please fill out your [address] you will receive after your visit in the mail or via [address]

## 2020-07-27 LAB
ALBUMIN SERPL-MCNC: 4 G/DL (ref 3.8–4.8)
ALBUMIN/GLOB SERPL: 1.2 {RATIO} (ref 1.2–2.2)
ALP SERPL-CCNC: 63 IU/L (ref 39–117)
ALT SERPL-CCNC: 15 IU/L (ref 0–32)
AST SERPL-CCNC: 27 IU/L (ref 0–40)
BASOPHILS # BLD AUTO: 0.1 X10E3/UL (ref 0–0.2)
BASOPHILS NFR BLD AUTO: 1 %
BILIRUB SERPL-MCNC: 0.5 MG/DL (ref 0–1.2)
BUN SERPL-MCNC: 14 MG/DL (ref 8–27)
BUN/CREAT SERPL: 17 (ref 12–28)
CALCIUM SERPL-MCNC: 10.9 MG/DL (ref 8.7–10.3)
CHLORIDE SERPL-SCNC: 101 MMOL/L (ref 96–106)
CO2 SERPL-SCNC: 21 MMOL/L (ref 20–29)
COMMENT, 144067: NORMAL
CREAT SERPL-MCNC: 0.84 MG/DL (ref 0.57–1)
EOSINOPHIL # BLD AUTO: 0.3 X10E3/UL (ref 0–0.4)
EOSINOPHIL NFR BLD AUTO: 3 %
ERYTHROCYTE [DISTWIDTH] IN BLOOD BY AUTOMATED COUNT: 11.3 % (ref 11.7–15.4)
GAMMA INTERFERON BACKGROUND BLD IA-ACNC: 0.02 IU/ML
GLOBULIN SER CALC-MCNC: 3.3 G/DL (ref 1.5–4.5)
GLUCOSE SERPL-MCNC: 102 MG/DL (ref 65–99)
HBV CORE AB SERPL QL IA: NEGATIVE
HBV CORE IGM SERPL QL IA: POSITIVE
HBV E AB SERPL QL IA: NEGATIVE
HBV E AG SERPL QL IA: NEGATIVE
HBV SURFACE AB SER QL: NON REACTIVE
HBV SURFACE AG SERPL QL IA: NEGATIVE
HCT VFR BLD AUTO: 41.8 % (ref 34–46.6)
HCV AB S/CO SERPL IA: 0.1 S/CO RATIO (ref 0–0.9)
HGB BLD-MCNC: 13.3 G/DL (ref 11.1–15.9)
IMM GRANULOCYTES # BLD AUTO: 0 X10E3/UL (ref 0–0.1)
IMM GRANULOCYTES NFR BLD AUTO: 0 %
LYMPHOCYTES # BLD AUTO: 1.9 X10E3/UL (ref 0.7–3.1)
LYMPHOCYTES NFR BLD AUTO: 22 %
M TB IFN-G BLD-IMP: NEGATIVE
M TB IFN-G CD4+ BCKGRND COR BLD-ACNC: 0.03 IU/ML
MCH RBC QN AUTO: 31 PG (ref 26.6–33)
MCHC RBC AUTO-ENTMCNC: 31.8 G/DL (ref 31.5–35.7)
MCV RBC AUTO: 97 FL (ref 79–97)
MITOGEN IGNF BLD-ACNC: 4.9 IU/ML
MONOCYTES # BLD AUTO: 0.8 X10E3/UL (ref 0.1–0.9)
MONOCYTES NFR BLD AUTO: 9 %
NEUTROPHILS # BLD AUTO: 5.5 X10E3/UL (ref 1.4–7)
NEUTROPHILS NFR BLD AUTO: 65 %
PLATELET # BLD AUTO: 317 X10E3/UL (ref 150–450)
POTASSIUM SERPL-SCNC: 4.2 MMOL/L (ref 3.5–5.2)
PROT SERPL-MCNC: 7.3 G/DL (ref 6–8.5)
QUANTIFERON INCUBATION, QF1T: NORMAL
QUANTIFERON TB2 AG: 0.02 IU/ML
RBC # BLD AUTO: 4.29 X10E6/UL (ref 3.77–5.28)
SERVICE CMNT-IMP: NORMAL
SODIUM SERPL-SCNC: 139 MMOL/L (ref 134–144)
WBC # BLD AUTO: 8.5 X10E3/UL (ref 3.4–10.8)

## 2020-07-28 ENCOUNTER — TELEPHONE (OUTPATIENT)
Dept: RHEUMATOLOGY | Age: 65
End: 2020-07-28

## 2020-07-28 ENCOUNTER — HOSPITAL ENCOUNTER (OUTPATIENT)
Dept: LAB | Age: 65
Discharge: HOME | End: 2020-07-28
Payer: MEDICARE

## 2020-07-28 DIAGNOSIS — Z79.60 LONG-TERM USE OF IMMUNOSUPPRESSANT MEDICATION: Primary | ICD-10-CM

## 2020-07-28 PROCEDURE — 86480 TB TEST CELL IMMUN MEASURE: CPT

## 2020-07-28 PROCEDURE — 36415 COLL VENOUS BLD VENIPUNCTURE: CPT

## 2020-07-28 NOTE — TELEPHONE ENCOUNTER
Spoke with patient. Lab orders faxed to [facility] at [phone] per patient's request. Faxed confirmation received. Pt notified.

## 2020-07-28 NOTE — TELEPHONE ENCOUNTER
----- Message from Rosemarie Cash RN sent at 7/28/2020 10:40 AM EDT -----  Regarding: FW: Dr Genesis Corrales    ----- Message -----  From: Josephine Alert: 7/28/2020  10:26 AM EDT  To: [facility] Nurse Pool  Subject: Dr Delmas Carrel first and last name:      Reason for call:pt said she found a lab [facility] near her home where she can have labs drawn to take the Hep B test again,per Dr Jenise Stevenson request  and would like to know if the order      Callback required yes/no and why:yes to let her know how she will get the order or if it can be submitted electronically to her       Best contact number(s):[phone]      Details to clarify the request: the lab [facility] is at [place] [address] [address]

## 2020-07-28 NOTE — PROGRESS NOTES
Pt notified Hep B test need to be repeated. Pt information given to Karla Valera, to call pt to schedule a lab appointment .

## 2020-07-31 LAB
GAMMA INTERFERON BACKGROUND BLD IA-ACNC: 0.01 IU/ML
M TB IFN-G BLD-IMP: NEGATIVE
M TB IFN-G CD4+ BCKGRND COR BLD-ACNC: 0.01 IU/ML
MITOGEN IGNF BLD-ACNC: 7.94 IU/ML
QUANTIFERON INCUBATION, QF1T: NORMAL
QUANTIFERON TB2 AG: 0.01 IU/ML
SERVICE CMNT-IMP: NORMAL

## 2020-11-04 ENCOUNTER — HOSPITAL ENCOUNTER (OUTPATIENT)
Dept: LAB | Age: 65
Discharge: HOME | End: 2020-11-04
Payer: MEDICARE

## 2020-11-04 PROCEDURE — 82306 VITAMIN D 25 HYDROXY: CPT

## 2020-11-04 PROCEDURE — 84443 ASSAY THYROID STIM HORMONE: CPT

## 2020-11-04 PROCEDURE — 83970 ASSAY OF PARATHORMONE: CPT

## 2020-11-04 PROCEDURE — 80053 COMPREHEN METABOLIC PANEL: CPT

## 2020-11-04 PROCEDURE — 36415 COLL VENOUS BLD VENIPUNCTURE: CPT

## 2020-11-05 LAB
25(OH)D3+25(OH)D2 SERPL-MCNC: 45.1 NG/ML (ref 30–100)
ALBUMIN SERPL-MCNC: 4.1 G/DL (ref 3.8–4.8)
ALBUMIN/GLOB SERPL: 1.4 {RATIO} (ref 1.2–2.2)
ALP SERPL-CCNC: 72 IU/L (ref 39–117)
ALT SERPL-CCNC: 14 IU/L (ref 0–32)
AST SERPL-CCNC: 25 IU/L (ref 0–40)
BILIRUB SERPL-MCNC: 0.5 MG/DL (ref 0–1.2)
BUN SERPL-MCNC: 13 MG/DL (ref 8–27)
BUN/CREAT SERPL: 16 (ref 12–28)
CALCIUM SERPL-MCNC: 10.7 MG/DL (ref 8.7–10.3)
CHLORIDE SERPL-SCNC: 102 MMOL/L (ref 96–106)
CO2 SERPL-SCNC: 26 MMOL/L (ref 20–29)
CREAT SERPL-MCNC: 0.81 MG/DL (ref 0.57–1)
GLOBULIN SER CALC-MCNC: 2.9 G/DL (ref 1.5–4.5)
GLUCOSE SERPL-MCNC: 100 MG/DL (ref 65–99)
POTASSIUM SERPL-SCNC: 4.6 MMOL/L (ref 3.5–5.2)
PROT SERPL-MCNC: 7 G/DL (ref 6–8.5)
PTH-INTACT SERPL-MCNC: 36 PG/ML (ref 15–65)
SODIUM SERPL-SCNC: 140 MMOL/L (ref 134–144)
TSH SERPL DL<=0.005 MIU/L-ACNC: 1.67 UIU/ML (ref 0.45–4.5)

## 2020-11-09 ENCOUNTER — HOSPITAL ENCOUNTER (OUTPATIENT)
Dept: INFUSION THERAPY | Age: 65
Discharge: HOME | End: 2020-11-09
Payer: MEDICARE

## 2020-11-09 VITALS
HEART RATE: 75 BPM | TEMPERATURE: 97.8 F | DIASTOLIC BLOOD PRESSURE: 80 MMHG | SYSTOLIC BLOOD PRESSURE: 136 MMHG | OXYGEN SATURATION: 97 %

## 2020-11-09 LAB
MAGNESIUM SERPL-MCNC: 2 MG/DL (ref 1.6–2.4)
PHOSPHATE SERPL-MCNC: 3 MG/DL (ref 2.6–4.7)

## 2020-11-09 PROCEDURE — 36415 COLL VENOUS BLD VENIPUNCTURE: CPT

## 2020-11-09 PROCEDURE — 83735 ASSAY OF MAGNESIUM: CPT

## 2020-11-09 PROCEDURE — 74011250636 HC RX REV CODE- 250/636: Performed by: INTERNAL MEDICINE

## 2020-11-09 PROCEDURE — 84100 ASSAY OF PHOSPHORUS: CPT

## 2020-11-09 PROCEDURE — 96372 THER/PROPH/DIAG INJ SC/IM: CPT

## 2020-11-09 RX ADMIN — DENOSUMAB 60 MG: 60 INJECTION SUBCUTANEOUS at 12:17

## 2020-11-19 ENCOUNTER — HOSPITAL ENCOUNTER (OUTPATIENT)
Dept: INFUSION THERAPY | Age: 65
Discharge: HOME | End: 2020-11-19
Payer: MEDICARE

## 2020-11-19 VITALS
WEIGHT: 157.5 LBS | OXYGEN SATURATION: 96 % | TEMPERATURE: 98 F | SYSTOLIC BLOOD PRESSURE: 147 MMHG | BODY MASS INDEX: 29.76 KG/M2 | RESPIRATION RATE: 18 BRPM | HEART RATE: 81 BPM | DIASTOLIC BLOOD PRESSURE: 89 MMHG

## 2020-11-19 LAB
ALBUMIN SERPL-MCNC: 3.4 G/DL (ref 3.5–5)
ANION GAP SERPL CALC-SCNC: 5 MMOL/L (ref 5–15)
BUN SERPL-MCNC: 14 MG/DL (ref 6–20)
BUN/CREAT SERPL: 17 (ref 12–20)
CALCIUM SERPL-MCNC: 10 MG/DL (ref 8.5–10.1)
CHLORIDE SERPL-SCNC: 107 MMOL/L (ref 97–108)
CO2 SERPL-SCNC: 28 MMOL/L (ref 21–32)
CREAT SERPL-MCNC: 0.81 MG/DL (ref 0.55–1.02)
GLUCOSE SERPL-MCNC: 101 MG/DL (ref 65–100)
PHOSPHATE SERPL-MCNC: 2.9 MG/DL (ref 2.6–4.7)
POTASSIUM SERPL-SCNC: 3.9 MMOL/L (ref 3.5–5.1)
SODIUM SERPL-SCNC: 140 MMOL/L (ref 136–145)

## 2020-11-19 PROCEDURE — 80069 RENAL FUNCTION PANEL: CPT

## 2020-11-19 PROCEDURE — 36415 COLL VENOUS BLD VENIPUNCTURE: CPT

## 2020-12-08 ENCOUNTER — OFFICE VISIT (OUTPATIENT)
Dept: RHEUMATOLOGY | Age: 65
End: 2020-12-08
Payer: MEDICARE

## 2020-12-08 VITALS
BODY MASS INDEX: 29.19 KG/M2 | DIASTOLIC BLOOD PRESSURE: 96 MMHG | WEIGHT: 154.6 LBS | HEIGHT: 61 IN | HEART RATE: 93 BPM | TEMPERATURE: 97.7 F | RESPIRATION RATE: 18 BRPM | OXYGEN SATURATION: 96 % | SYSTOLIC BLOOD PRESSURE: 163 MMHG

## 2020-12-08 DIAGNOSIS — Z79.899 ONGOING USE OF POSSIBLY TOXIC MEDICATION: ICD-10-CM

## 2020-12-08 DIAGNOSIS — M05.9 SEROPOSITIVE RHEUMATOID ARTHRITIS (HCC): ICD-10-CM

## 2020-12-08 DIAGNOSIS — E83.52 HYPERCALCEMIA: Primary | ICD-10-CM

## 2020-12-08 PROCEDURE — G8510 SCR DEP NEG, NO PLAN REQD: HCPCS | Performed by: INTERNAL MEDICINE

## 2020-12-08 PROCEDURE — 1090F PRES/ABSN URINE INCON ASSESS: CPT | Performed by: INTERNAL MEDICINE

## 2020-12-08 PROCEDURE — G8536 NO DOC ELDER MAL SCRN: HCPCS | Performed by: INTERNAL MEDICINE

## 2020-12-08 PROCEDURE — 1101F PT FALLS ASSESS-DOCD LE1/YR: CPT | Performed by: INTERNAL MEDICINE

## 2020-12-08 PROCEDURE — G0463 HOSPITAL OUTPT CLINIC VISIT: HCPCS | Performed by: INTERNAL MEDICINE

## 2020-12-08 PROCEDURE — 99215 OFFICE O/P EST HI 40 MIN: CPT | Performed by: INTERNAL MEDICINE

## 2020-12-08 PROCEDURE — G8419 CALC BMI OUT NRM PARAM NOF/U: HCPCS | Performed by: INTERNAL MEDICINE

## 2020-12-08 PROCEDURE — G8427 DOCREV CUR MEDS BY ELIG CLIN: HCPCS | Performed by: INTERNAL MEDICINE

## 2020-12-08 PROCEDURE — G8399 PT W/DXA RESULTS DOCUMENT: HCPCS | Performed by: INTERNAL MEDICINE

## 2020-12-08 PROCEDURE — 3017F COLORECTAL CA SCREEN DOC REV: CPT | Performed by: INTERNAL MEDICINE

## 2020-12-08 NOTE — PATIENT INSTRUCTIONS
1. The joints look OK today; you have a bit of active arthritis in the left thumb, but you can keep using the Voltaren gel for now, and let me know if your wrists worsen or if you get more stiffness and we can intensify your rheumatoid arthritis treatment. 2. You do still have the crackles in the bottoms of the lungs (as we'd expect once we found it), but you should continue following with your Pulmonologist to make sure this isn't getting any worse. Very rarely, leflunomide can worsen lung disease too, so I if your Pulmonologist thinks your lungs are worsening, then we may want to try an alternative like rituximab. 3. Labs every 3 months (next before next visit)    4. Continue Prolia every 6 months.     5. Return in 3 months

## 2020-12-08 NOTE — PROGRESS NOTES
Chief Complaint   Patient presents with    Arthritis     thumb     1. Have you been to the ER, urgent care clinic since your last visit? Hospitalized since your last visit? No    2. Have you seen or consulted any other health care providers outside of the [address] [place] [address] since your last visit? Include any pap smears or colon screening.  No

## 2020-12-08 NOTE — LETTER
12/8/20 Patient: Trenna Cooks YOB: 1955 Date of Visit: 12/8/2020 Prudencio Underwood MD 
[address] [address] VIA In Basket Dear Prudencio Underwood MD, Thank you for referring Ms. Trenna Cooks to [facility] for evaluation. My notes for this consultation are attached. Dr. Trevon Ramsay, I have encouraged the patient to reach back out to you for monitoring of her ILD, though it's not clear this is progressing; please let me know if you have concerns that her leflunomide could be contributing. If you have questions, please do not hesitate to call me. I look forward to following your patient along with you.  
 
 
Sincerely, 
 
Valentine Trevino MD

## 2020-12-08 NOTE — PROGRESS NOTES
REASON FOR VISIT    Patient presents for a follow up visit for seropositive RA    HISTORY OF DISEASE: Seropositive Rheumatoid Arthritis; erosive    Year of diagnosis: 2013  First visit with this clinic:  3/2019  Cumulative disease manifestations:   Positive serologies/labs: RF, CCP  Negative serologies/labs:   Extra-articular comorbidities: Pulmonary fibrosis   Other co-morbidities:  Osteoporosis, low-grade hyperCa  Relapses: 12/2018 but on not treatment     Past treatment history:  Prednisone:   Non-biologic DMARD: Methotrexate 20 mg oral weekly (2013-1/2020; d/blake due to hair loss) Leflunomide 20 mg oral daily (1/2020-present)  Biologic:  Other: Prolia 11/2019  Broke wrist in 2006, never felt it healed entirely, went for Xrays and told she had arthritis. Started methotrexate      HISTORY OF PRESENT ILLNESS     Taking leflunomide 20mg daily,   Last Prolio shot was 2 weeks ago. Wasn't aware of any problems with high calcium--denies abdominal pain, constipation, or headaches. No problems with interval infections. No back pain. Now about 15 minutes morning stiffness. Hasn't needed Voltaren gel in last couple of months as generally please. Aches are in both wrists or left 1st MCP when they happen now. Walks for 1.5 hours/day, denies dyspnea. Remember         REVIEW OF SYSTEMS    A comprehensive review of systems was performed and pertinent results are documented in the HPI, review of systems is otherwise non-contributory. PAST MEDICAL HISTORY    Past Medical History:   Diagnosis Date    Osteoporosis     Rheumatoid arthritis ([facility] [address]         No past surgical history on file. FAMILY HISTORY    Family History   Problem Relation Age of Onset    No Known Problems Mother     No Known Problems Father        SOCIAL HISTORY    Social History     Tobacco Use    Smoking status: Former Smoker    Smokeless tobacco: Never Used    Tobacco comment: Quit 3 Years Ago   Substance Use Topics    Alcohol use:  Yes Comment: Social    Drug use: No       MEDICATIONS    Current Outpatient Medications   Medication Sig Dispense Refill    predniSONE (DELTASONE) 10 mg tablet Take 2 pills daily for 2 weeks then take 1 pill daily for 1 week then stop 40 Tab 0    leflunomide (ARAVA) 20 mg tablet Take 1 Tab by mouth daily. 90 Tab 1    denosumab (PROLIA) 60 mg/mL injection 60 mg by SubCUTAneous route once. Indications: patient gets this injection every 6 months      calcium carbonate-vitamin D3 (CALTRATE 600 + D) 600 mg (1,500 mg)-800 unit chew Take 2 Tabs by mouth daily. 180 Tab 3       ALLERGIES    No Known Allergies    PHYSICAL EXAMINATION  Visit Vitals  BP (!) 163/96 (BP 1 Location: Left arm, BP Patient Position: Sitting) Comment: Dr. Olinda Marshall notified of bp readings   Pulse 93   Temp 97.7 °F (36.5 °C) (Oral)   Resp 18   Ht 5' 1\" (1.549 m)   Wt 154 lb 9.6 oz (70.1 kg)   SpO2 96%   BMI 29.21 kg/m²     General:  The patient is well developed, well nourished, alert, and in no apparent distress. Eyes: Sclera are anicteric. No conjunctival injection. HEENT:  Oropharynx is clear. No oral ulcers. Adequate salivary pooling. No cervical or supraclavicular lymphadenopathy. Lungs: Bibasilar crackles, without wheeze or stridor. Normal respiratory effort. Cor:  Regular rate and rhythm. No murmurs rubs or gallops. Abdomen: Soft, non-tender, without hepatomegaly or masses. Extremities: No calf tenderness or edema. Warm and well perfused. Skin:  No significant abnormalities. Neuro: Nonfocal  Musculoskeletal:    A comprehensive musculoskeletal exam was performed for all joints of each upper and lower extremity and assessed for swelling, tenderness and range of motion. Results are documented as below:  Limited flexion/extension bilateral wrists, trace left wrist synovitis. Mild left 1st MCP synovitis. Interval resolved ankle synovitis  No evidence of synovitis in the shoulders, elbows, hips, knees or ankles.        Joint Count 12/8/2020 1/27/2020 7/26/2019 4/26/2019 3/27/2019   Patient pain (0-100) 5 0 0 0 75   MHAQ 0 0 0 0 0.25   Left elbow - Tender - - 0 - -   Left elbow - Swollen - 0 0 - -   Left wrist- Tender 1 0 - - -   Left wrist- Swollen 1 - - - -   Left 1st MCP - Tender 1 - - - -   Left 1st MCP - Swollen 1 - - - -   Left 4th MCP - Tender - - - 0 -   Left 4th MCP - Swollen - - - 0 -   Left thumb IP - Swollen - - - - 0   Left 3rd PIP - Tender - - - - 0   Right wrist- Tender 0 - - - 1   Right wrist- Swollen 0 - - - 1   Tender Joint Count (Total) 2 0 0 0 1   Swollen Joint Count (Total) 2 0 0 0 1   Physician Assessment (0-10) 3 0 0 0 4   Patient Assessment (0-10) 0 0 0 0 5   CDAI Total (calculated) 7 0 0 0 11       DATA REVIEW    Prior medical records were reviewed and if applicable are summarized as below:    Labs:   11/2020: Vit D 45; TSH WNL, Ca 10.7->10.0, BMP WNL  7/2020:  CBC WNL, Ca 10.9, Cr 0.84, CMP WNL; Hep B SAg neg, SAb neg, IgM cAb+, total cAb neg, eAg neg; QFN neg  1/2020: Cbc, cmp unremarkable  10/2019: cbc with thrombocytosis, elevated PMNs, lymph, monocytes, CMP normal  7/2019: Cbc, cmp unremarkable  5/2019: BMP normal  3/2019: CCP, RF positive, ESR, CRP normal, Hep B, C, quant gold negative  2/2019: cbc, CMP unremarkable    Imaging:   CT chest (11/11/19): 1. There are peripheral reticular opacities with honeycombing. This is most  pronounced anteriorly in each upper lobe left greater than right also involves  the lower lobes. These findings are consistent with pulmonary fibrosis. Pattern  is not typical for UIP. 2. Adenopathy as described above. This could be reactive. Exact etiology is  uncertain. Follow-up exam in 3-6 months may yield more information. Hand X-rays (3/2019): Personally reviewed images. + erosions consistent with RA and severe OA  Foot x-rays (3/2019): Personally reviewed images.  + erosions  DEXA (3/2019): T score -2.6 at the left radius    ASSESSMENT AND PLAN    A 76 y.o. female with hx of osteoporosis, seropositive erosive rheumatoid arthritis on leflunomide 20 mg oral daily presents for a follow up visit. Patient has joint damage and smoldering synovitis today, but still low disease activity and she is content to maintain her current regimen. Am concerned with her mild cough and h/o ILD which she was not aware of--encouraged her to reestablish with Pulmonary so we can target RA-ILD if needed for ILD progression. Recent hypercalcemia and upper-predominant scarring could also suggest sarcoidosis--sending (1,25)OHD as well. # Seropositive erosive Rheumatoid arthritis:  - continue leflunomide 20 mg oral daily. 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 Francheska German    # Osteoporosis:  - s/p Prolia in 4/2020, 11/2020. Re-dose in May 2021    # Hand osteoarthritis: largely asymptomatic. Erosive osteoarthritis. - will continue to monitor     #Hypertension  -Pt agrees to reach out to PCP, reduce salt in diet    # Medication Toxicity Monitoring:  - cbc, cmp q3mo  - Hepatitis B, C: negative 3/2019--false positive Hep B cAb  - Quant gold: negative 3/2019  - Immunizations: We discussed receiving influenza, Prevar-13, and Pneumovax-23 vaccines as per the CDC guidelines for immunosuppressed patients. - bone health: see above    RTC in 3 months    Patient Instructions   1. The joints look OK today; you have a bit of active arthritis in the left thumb, but you can keep using the Voltaren gel for now, and let me know if your wrists worsen or if you get more stiffness and we can intensify your rheumatoid arthritis treatment. 2. You do still have the crackles in the bottoms of the lungs (as we'd expect once we found it), but you should continue following with your Pulmonologist to make sure this isn't getting any worse.  Very rarely, leflunomide can worsen lung disease too, so I if your Pulmonologist thinks your lungs are worsening, then we may want to try an alternative like rituximab. 3. Labs every 3 months (next before next visit)    4. Continue Prolia every 6 months. 5. Return in 3 months      The patient voiced understanding of the aforementioned assessment and plan. Summary of plan was provided in the After Visit Summary patient instructions. I also provided education about MyChart setup and utility. Ms. Holger Figueroa has a reminder for a \"due or due soon\" health maintenance. I have asked that she contact her primary care provider for follow-up on this health maintenance.     TODAY'S ORDERS    Orders Placed This Encounter    C REACTIVE PROTEIN, QT    CBC WITH AUTOMATED DIFF    METABOLIC PANEL, COMPREHENSIVE    SED RATE (ESR)    VITAMIN D, 1, 25 DIHYDROXY       Future Appointments   Date Time Provider Catherine Reynaga   5/10/2021 11:30 AM SS INF7 CH2 <1H [facility] Hayder Maddox MD    Adult Rheumatology   General acute hospital  [facility] of [facility] [address]   Phone [phone]  Fax [phone]

## 2020-12-15 ENCOUNTER — VIRTUAL VISIT (OUTPATIENT)
Dept: FAMILY MEDICINE CLINIC | Age: 65
End: 2020-12-15
Payer: MEDICARE

## 2020-12-15 DIAGNOSIS — J84.10 PULMONARY FIBROSIS (HCC): ICD-10-CM

## 2020-12-15 DIAGNOSIS — R03.0 ELEVATED BLOOD PRESSURE READING: Primary | ICD-10-CM

## 2020-12-15 PROCEDURE — G8399 PT W/DXA RESULTS DOCUMENT: HCPCS | Performed by: STUDENT IN AN ORGANIZED HEALTH CARE EDUCATION/TRAINING PROGRAM

## 2020-12-15 PROCEDURE — G8432 DEP SCR NOT DOC, RNG: HCPCS | Performed by: STUDENT IN AN ORGANIZED HEALTH CARE EDUCATION/TRAINING PROGRAM

## 2020-12-15 PROCEDURE — 1090F PRES/ABSN URINE INCON ASSESS: CPT | Performed by: STUDENT IN AN ORGANIZED HEALTH CARE EDUCATION/TRAINING PROGRAM

## 2020-12-15 PROCEDURE — 3017F COLORECTAL CA SCREEN DOC REV: CPT | Performed by: STUDENT IN AN ORGANIZED HEALTH CARE EDUCATION/TRAINING PROGRAM

## 2020-12-15 PROCEDURE — G8427 DOCREV CUR MEDS BY ELIG CLIN: HCPCS | Performed by: STUDENT IN AN ORGANIZED HEALTH CARE EDUCATION/TRAINING PROGRAM

## 2020-12-15 PROCEDURE — 1101F PT FALLS ASSESS-DOCD LE1/YR: CPT | Performed by: STUDENT IN AN ORGANIZED HEALTH CARE EDUCATION/TRAINING PROGRAM

## 2020-12-15 PROCEDURE — G0463 HOSPITAL OUTPT CLINIC VISIT: HCPCS | Performed by: STUDENT IN AN ORGANIZED HEALTH CARE EDUCATION/TRAINING PROGRAM

## 2020-12-15 PROCEDURE — 99213 OFFICE O/P EST LOW 20 MIN: CPT | Performed by: STUDENT IN AN ORGANIZED HEALTH CARE EDUCATION/TRAINING PROGRAM

## 2020-12-15 NOTE — PROGRESS NOTES
Jacoby Barnard  76 y.o. female  1955  [address] [place] [address]  [id number]   [address]:    Telemedicine Progress Note  Michelle Ortiz,        Encounter Date and Time: December 15, 2020 at 10:14 AM    Consent: Jacoby Barnard, who was seen by synchronous (real-time) audio-video technology, and/or her healthcare decision maker, is aware that this patient-initiated, Telehealth encounter on 12/15/2020 is a billable service, with coverage as determined by her insurance carrier. She is aware that she may receive a bill and has provided verbal consent to proceed: Yes. Chief Complaint   Patient presents with    Hypertension     History of Present Illness   Jacoby Barnard is a 76 y.o. female was evaluated by synchronous (real-time) audio-video technology from home, through a secure patient portal.    Elevated blood pressure  - Went to rheumatologist doctor last Thursday on 12/10 and her BP was 163/96. Patient reports she was very stressed and anxious on this day because her daughter was having surgery. She has been checking her blood pressure at home since that visit and it has been 131/65 and 137/80.  - No previous dx of HTN  - Denies CP, SOB, vision changes, HA    Pulmonary fibrosis  - Was found incidentally on a CXR in 2019. Of note patient is on methotrexate for RA  - Saw sheela Singletary, one time last year after initial dx. Per patient, pulm said the lung scarring was \"very faint\" and there was nothing to do at that time. At her recent rheum appointment on 12/10 her doctor told her she had crackles in her lungs and said she should be re-evaluated by pulm. She would like a referral to go back to see them at this time  - + mild dry coug. Denies SOB.  - Normal echo in 11/2019 with EF 56-60%. Last lung CT in 12/2019 showing honeycombing consistent with pulm fibrosis and adenopathy that may be reactive. Repeat exam was recommended in 3-6 months.     Review of Systems   Review of Systems   Constitutional: Negative for chills and fever. HENT: Negative for sore throat. Respiratory: Positive for cough. Negative for shortness of breath. Cardiovascular: Negative for chest pain and palpitations. Gastrointestinal: Negative for abdominal pain, nausea and vomiting. Musculoskeletal: Negative for myalgias. Neurological: Negative for dizziness and headaches. Vitals/Objective:     General: alert, cooperative, no distress   Mental  status: mental status: alert, oriented to person, place, and time, normal mood, behavior, speech, dress, motor activity, and thought processes   Resp: resp: normal effort and no respiratory distress   Neuro: neuro: no gross deficits   Skin: skin: no discoloration or lesions of concern on visible areas   Due to this being a TeleHealth evaluation, many elements of the physical examination are unable to be assessed. Assessment and Plan:   Time-based coding, delete if not needed: I spent at least 10 minutes with this established patient, and >50% of the time was spent counseling and/or coordinating care regarding her elevated blood pressure and lung issues    1. Elevated blood pressure reading  - Continue to monitor BP at home over the next 2 weeks and keep a log. Instructed to take BP while sitting, should be resting for 5-10min prior to taking BP. Also no caffeine immediately prior.   - Follow up in 2 weeks. May consider adding BP medication at that time if needed. 2. Pulmonary fibrosis ([facility] [address]  - REFERRAL TO PULMONARY DISEASE  - Will likely need repeat lung CT, but will defer to pulm for further workup         We discussed the expected course, resolution and complications of the diagnosis(es) in detail. Medication risks, benefits, costs, interactions, and alternatives were discussed as indicated. I advised her to contact the office if her condition worsens, changes or fails to improve as anticipated.  She expressed understanding with the diagnosis(es) and plan. Patient understands that this encounter was a temporary measure, and the importance of further follow up and examination was emphasized. Patient verbalized understanding. Patient informed to follow up: 2 weeks. Electronically Signed: Joseph Silva DO    CPT Codes 60385-35992 for Established Patients may apply to this Telehealth Visit. POS code: 18. Modifier GT    Santiago Pemberton is a 76 y.o. female who was evaluated by an audio-video encounter for concerns as above. Patient identification was verified prior to start of the visit. A caregiver was present when appropriate. Due to this being a TeleHealth encounter (During ITPDJ-72 public health emergency), evaluation of the following organ systems was limited: Vitals/Constitutional/EENT/Resp/CV/GI//MS/Neuro/Skin/Heme-Lymph-Imm. Pursuant to the emergency declaration under the [address] [place], [place] [address] waiver authority and the [employer] Act, this Virtual Visit was conducted, with patient's (and/or legal guardian's) consent, to reduce the patient's risk of exposure to COVID-19 and provide necessary medical care. Services were provided through a synchronous discussion virtually to substitute for in-person clinic visit. I was at home. The patient was at home. History   Patients past medical, surgical and family histories were reviewed and updated. Past Medical History:   Diagnosis Date    Osteoporosis     Rheumatoid arthritis ([facility] [address]      No past surgical history on file.   Family History   Problem Relation Age of Onset    No Known Problems Mother     No Known Problems Father      Social History     Socioeconomic History    Marital status:      Spouse name: Not on file    Number of children: Not on file    Years of education: Not on file    Highest education level: Not on file   Occupational History    Not on file Social Needs    Financial resource strain: Not on file    Food insecurity     Worry: Not on file     Inability: Not on file    Transportation needs     Medical: Not on file     Non-medical: Not on file   Tobacco Use    Smoking status: Former Smoker    Smokeless tobacco: Never Used    Tobacco comment: Quit 3 Years Ago   Substance and Sexual Activity    Alcohol use: Yes     Comment: Social    Drug use: No    Sexual activity: Never   Lifestyle    Physical activity     Days per week: Not on file     Minutes per session: Not on file    Stress: Not on file   Relationships    Social connections     Talks on phone: Not on file     Gets together: Not on file     Attends Buddhism service: Not on file     Active member of club or organization: Not on file     Attends meetings of clubs or organizations: Not on file     Relationship status: Not on file    Intimate partner violence     Fear of current or ex partner: Not on file     Emotionally abused: Not on file     Physically abused: Not on file     Forced sexual activity: Not on file   Other Topics Concern    Not on file   Social History Narrative    Not on file     Patient Active Problem List   Diagnosis Code    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 90 Tab 1    denosumab (PROLIA) 60 mg/mL injection 60 mg by SubCUTAneous route once. Indications: patient gets this injection every 6 months      calcium carbonate-vitamin D3 (CALTRATE 600 + D) 600 mg (1,500 mg)-800 unit chew Take 2 Tabs by mouth daily.  180 Tab 3     No Known Allergies

## 2020-12-15 NOTE — PROGRESS NOTES
[address] Medicine Residency Attending Addendum:  Dr. Mary Ellen Gray, DO,  the patient and I were not physically present during this encounter. The resident and I are concurrently monitoring the patient care through appropriate telecommunication technology. I discussed the findings, assessment and plan with the resident and agree with the resident's findings and plan as documented in the resident's note.       Joanne Cha MD

## 2020-12-28 ENCOUNTER — VIRTUAL VISIT (OUTPATIENT)
Dept: FAMILY MEDICINE CLINIC | Age: 65
End: 2020-12-28
Payer: MEDICARE

## 2020-12-28 DIAGNOSIS — R03.0 ELEVATED BLOOD PRESSURE READING: Primary | ICD-10-CM

## 2020-12-28 PROCEDURE — G0463 HOSPITAL OUTPT CLINIC VISIT: HCPCS | Performed by: STUDENT IN AN ORGANIZED HEALTH CARE EDUCATION/TRAINING PROGRAM

## 2020-12-28 PROCEDURE — G8399 PT W/DXA RESULTS DOCUMENT: HCPCS | Performed by: STUDENT IN AN ORGANIZED HEALTH CARE EDUCATION/TRAINING PROGRAM

## 2020-12-28 PROCEDURE — 1101F PT FALLS ASSESS-DOCD LE1/YR: CPT | Performed by: STUDENT IN AN ORGANIZED HEALTH CARE EDUCATION/TRAINING PROGRAM

## 2020-12-28 PROCEDURE — 3017F COLORECTAL CA SCREEN DOC REV: CPT | Performed by: STUDENT IN AN ORGANIZED HEALTH CARE EDUCATION/TRAINING PROGRAM

## 2020-12-28 PROCEDURE — 99213 OFFICE O/P EST LOW 20 MIN: CPT | Performed by: STUDENT IN AN ORGANIZED HEALTH CARE EDUCATION/TRAINING PROGRAM

## 2020-12-28 PROCEDURE — G8432 DEP SCR NOT DOC, RNG: HCPCS | Performed by: STUDENT IN AN ORGANIZED HEALTH CARE EDUCATION/TRAINING PROGRAM

## 2020-12-28 PROCEDURE — 1090F PRES/ABSN URINE INCON ASSESS: CPT | Performed by: STUDENT IN AN ORGANIZED HEALTH CARE EDUCATION/TRAINING PROGRAM

## 2020-12-28 PROCEDURE — G8427 DOCREV CUR MEDS BY ELIG CLIN: HCPCS | Performed by: STUDENT IN AN ORGANIZED HEALTH CARE EDUCATION/TRAINING PROGRAM

## 2020-12-28 NOTE — PROGRESS NOTES
Heather Mccrary  76 y.o. female  1955  [address] [place] [address]  [id number]   [address]:    Telemedicine Progress Note  Richard Liang DO       Encounter Date and Time: December 28, 2020 at 8:45 AM    Consent: Heather Mccrary, who was seen by synchronous (real-time) audio-video technology, and/or her healthcare decision maker, is aware that this patient-initiated, Telehealth encounter on 12/28/2020 is a billable service, with coverage as determined by her insurance carrier. She is aware that she may receive a bill and has provided verbal consent to proceed: Yes. Chief Complaint   Patient presents with    Hypertension     History of Present Illness   Heather Mccrary is a 76 y.o. female was evaluated by synchronous (real-time) audio-video technology from home, through a secure patient portal.    Elevated blood pressure  - Was seen on 12/15 due to elevated BP at her rheumatologist appt to 160s/90s. Of note, that same day her daughter was having surgery so she was very nervous/stressed about that. - Home BP readings since our last visit have been [id number]. - Denies CP, SOB, vision changes, HA     Review of Systems   Review of Systems   Constitutional: Negative for chills, fever and malaise/fatigue. Eyes: Negative for blurred vision and double vision. Respiratory: Negative for shortness of breath. Cardiovascular: Negative for chest pain. Gastrointestinal: Negative for abdominal pain. Neurological: Negative for headaches.        Vitals/Objective:     General: alert, cooperative, no distress   Mental  status: mental status: alert, oriented to person, place, and time, normal mood, behavior, speech, dress, motor activity, and thought processes   Resp: resp: normal effort and no respiratory distress   Neuro: neuro: no gross deficits   Skin: skin: no discoloration or lesions of concern on visible areas   Due to this being a TeleHealth evaluation, many elements of the physical examination are unable to be assessed. Assessment and Plan:     1. Elevated blood pressure reading: One time occurrence likely due to daughter having surgery that day. Home BP readings have been at goal since that time  - Continue to monitor BP occasionally (about one time per week). Call clinic if BP consistently >140/90s. We discussed the expected course, resolution and complications of the diagnosis(es) in detail. Medication risks, benefits, costs, interactions, and alternatives were discussed as indicated. I advised her to contact the office if her condition worsens, changes or fails to improve as anticipated. She expressed understanding with the diagnosis(es) and plan. Patient understands that this encounter was a temporary measure, and the importance of further follow up and examination was emphasized. Patient verbalized understanding. Patient informed to follow up: prn. Follow-up and Dispositions  ·   Return if symptoms worsen or fail to improve. Electronically Signed: Agnieszka Guerra DO    CPT Codes 69057-99007 for Established Patients may apply to this Telehealth Visit. POS code: 18. Modifier GT    Bj Pathak is a 76 y.o. female who was evaluated by an audio-video encounter for concerns as above. Patient identification was verified prior to start of the visit. A caregiver was present when appropriate. Due to this being a TeleHealth encounter (During [facility]- public health emergency), evaluation of the following organ systems was limited: Vitals/Constitutional/EENT/Resp/CV/GI//MS/Neuro/Skin/Heme-Lymph-Imm.   Pursuant to the emergency declaration under the [address] [place] [address], [address] [place] [address] authority and the [employer] Act, this Virtual Visit was conducted, with patient's (and/or legal guardian's) consent, to reduce the patient's risk of exposure to COVID-19 and provide necessary medical care. Services were provided through a synchronous discussion virtually to substitute for in-person clinic visit. I was at home. The patient was at home. History   Patients past medical, surgical and family histories were reviewed and updated. Past Medical History:   Diagnosis Date    Osteoporosis     Rheumatoid arthritis ([facility] [address]      No past surgical history on file.   Family History   Problem Relation Age of Onset    No Known Problems Mother     No Known Problems Father      Social History     Socioeconomic History    Marital status:      Spouse name: Not on file    Number of children: Not on file    Years of education: Not on file    Highest education level: Not on file   Occupational History    Not on file   Social Needs    Financial resource strain: Not on file    Food insecurity     Worry: Not on file     Inability: Not on file    Transportation needs     Medical: Not on file     Non-medical: Not on file   Tobacco Use    Smoking status: Former Smoker    Smokeless tobacco: Never Used    Tobacco comment: Quit 3 Years Ago   Substance and Sexual Activity    Alcohol use: Yes     Comment: Social    Drug use: No    Sexual activity: Never   Lifestyle    Physical activity     Days per week: Not on file     Minutes per session: Not on file    Stress: Not on file   Relationships    Social connections     Talks on phone: Not on file     Gets together: Not on file     Attends Restorationist service: Not on file     Active member of club or organization: Not on file     Attends meetings of clubs or organizations: Not on file     Relationship status: Not on file    Intimate partner violence     Fear of current or ex partner: Not on file     Emotionally abused: Not on file     Physically abused: Not on file     Forced sexual activity: Not on file   Other Topics Concern    Not on file   Social History Narrative    Not on file     Patient Active Problem List   Diagnosis Code    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 90 Tab 1    denosumab (PROLIA) 60 mg/mL injection 60 mg by SubCUTAneous route once. Indications: patient gets this injection every 6 months      calcium carbonate-vitamin D3 (CALTRATE 600 + D) 600 mg (1,500 mg)-800 unit chew Take 2 Tabs by mouth daily.  180 Tab 3     No Known Allergies

## 2021-01-19 DIAGNOSIS — M05.9 SEROPOSITIVE RHEUMATOID ARTHRITIS (HCC): Primary | ICD-10-CM

## 2021-01-19 RX ORDER — LEFLUNOMIDE 20 MG/1
20 TABLET ORAL DAILY
Qty: 90 TAB | Refills: 1 | Status: SHIPPED | OUTPATIENT
Start: 2021-01-19 | End: 2021-07-19

## 2021-01-19 NOTE — TELEPHONE ENCOUNTER
----- Message from Eli Bhakta LPN sent at 6/73/3728  4:37 PM EST -----  Regarding: FW: Dr Hiren Forrest    ----- Message -----  From: Rhonda Jon  Sent: 1/18/2021   2:44 PM EST  To: [facility] Nurse Esteban  Subject: Dr Deloris Saavedra is calling regarding her prescription  Leflunomide on not being approve, please call into [facility], please call pt at [phone] if there is a problem.

## 2021-02-02 ENCOUNTER — TRANSCRIBE ORDER (OUTPATIENT)
Dept: SCHEDULING | Age: 66
End: 2021-02-02

## 2021-02-02 DIAGNOSIS — J84.10 PULMONARY FIBROSIS (HCC): Primary | ICD-10-CM

## 2021-02-06 ENCOUNTER — HOSPITAL ENCOUNTER (OUTPATIENT)
Dept: CT IMAGING | Age: 66
Discharge: HOME | End: 2021-02-06
Attending: INTERNAL MEDICINE
Payer: MEDICARE

## 2021-02-06 DIAGNOSIS — J84.10 PULMONARY FIBROSIS (HCC): ICD-10-CM

## 2021-02-06 PROCEDURE — 71250 CT THORAX DX C-: CPT

## 2021-02-09 NOTE — PROGRESS NOTES
Has The Growth Been Previously Biopsied?: has been previously biopsied [facility] Short Visit Progress Note     Patient admitted to [facility] for Prolia ambulatory in stable condition. Assessment completed. No new concerns voiced. Patient denied any dental work in the last 6 weeks and does not plan on any dental work the next 6 weeks. Vital Signs:  Visit Vitals  /80   Pulse 75   Temp 97.8 °F (36.6 °C)   SpO2 97%   Breastfeeding No     Patient Vitals for the past 12 hrs:   Temp Pulse BP SpO2   11/09/20 1220  75 136/80 97 %   11/09/20 1145 97.8 °F (36.6 °C) 81 126/74 97 %           Lab Results:  Recent Results (from the past 12 hour(s))   MAGNESIUM    Collection Time: 11/09/20 11:53 AM   Result Value Ref Range    Magnesium 2.0 1.6 - 2.4 mg/dL   PHOSPHORUS    Collection Time: 11/09/20 11:53 AM   Result Value Ref Range    Phosphorus 3.0 2.6 - 4.7 MG/DL           Medications:  Medications Administered     denosumab (PROLIA) injection 60 mg     Admin Date  11/09/2020 Action  Given Dose  60 mg Route  SubCUTAneous Administered By  Samantha Reyes RN                 Patient tolerated treatment well. Patient discharged from [place] [address] ambulatory in no distress. Patient aware of next appointment.     Future Appointments   Date Time Provider Catherine Reynaga   11/19/2020 11:30 AM SS INF7 CH1 LAB [facility] [place]   12/8/2020  9:40 AM Caroline Vidal MD [facility] BS AMB   5/10/2021 11:30 AM SS INF7 CH2 <1H [address] Body Location Override (Optional): left nasal root

## 2021-03-08 ENCOUNTER — OFFICE VISIT (OUTPATIENT)
Dept: RHEUMATOLOGY | Age: 66
End: 2021-03-08
Payer: MEDICARE

## 2021-03-08 VITALS
HEIGHT: 61 IN | SYSTOLIC BLOOD PRESSURE: 123 MMHG | TEMPERATURE: 97.8 F | HEART RATE: 91 BPM | RESPIRATION RATE: 18 BRPM | OXYGEN SATURATION: 96 % | WEIGHT: 151.8 LBS | BODY MASS INDEX: 28.66 KG/M2 | DIASTOLIC BLOOD PRESSURE: 84 MMHG

## 2021-03-08 DIAGNOSIS — Z79.899 ONGOING USE OF POSSIBLY TOXIC MEDICATION: ICD-10-CM

## 2021-03-08 DIAGNOSIS — M05.9 SEROPOSITIVE RHEUMATOID ARTHRITIS (HCC): Primary | ICD-10-CM

## 2021-03-08 DIAGNOSIS — M81.0 AGE RELATED OSTEOPOROSIS, UNSPECIFIED PATHOLOGICAL FRACTURE PRESENCE: ICD-10-CM

## 2021-03-08 PROCEDURE — G8427 DOCREV CUR MEDS BY ELIG CLIN: HCPCS | Performed by: INTERNAL MEDICINE

## 2021-03-08 PROCEDURE — G8419 CALC BMI OUT NRM PARAM NOF/U: HCPCS | Performed by: INTERNAL MEDICINE

## 2021-03-08 PROCEDURE — G8510 SCR DEP NEG, NO PLAN REQD: HCPCS | Performed by: INTERNAL MEDICINE

## 2021-03-08 PROCEDURE — 1090F PRES/ABSN URINE INCON ASSESS: CPT | Performed by: INTERNAL MEDICINE

## 2021-03-08 PROCEDURE — G8536 NO DOC ELDER MAL SCRN: HCPCS | Performed by: INTERNAL MEDICINE

## 2021-03-08 PROCEDURE — 1101F PT FALLS ASSESS-DOCD LE1/YR: CPT | Performed by: INTERNAL MEDICINE

## 2021-03-08 PROCEDURE — 99214 OFFICE O/P EST MOD 30 MIN: CPT | Performed by: INTERNAL MEDICINE

## 2021-03-08 PROCEDURE — 3017F COLORECTAL CA SCREEN DOC REV: CPT | Performed by: INTERNAL MEDICINE

## 2021-03-08 PROCEDURE — G0463 HOSPITAL OUTPT CLINIC VISIT: HCPCS | Performed by: INTERNAL MEDICINE

## 2021-03-08 RX ORDER — BENZONATATE 200 MG/1
CAPSULE ORAL
COMMUNITY
Start: 2021-03-04 | End: 2021-12-03

## 2021-03-08 NOTE — PROGRESS NOTES
REASON FOR VISIT    Patient presents for a follow up visit for seropositive RA    HISTORY OF DISEASE: Seropositive Rheumatoid Arthritis; erosive    Year of diagnosis: 2013  First visit with this clinic:  3/2019  Cumulative disease manifestations:   Positive serologies/labs: RF, CCP  Negative serologies/labs:   Extra-articular comorbidities: Pulmonary fibrosis   Other co-morbidities:  Osteoporosis, low-grade hyperCa  Relapses: 12/2018 but on not treatment     Past treatment history:  Prednisone:   Non-biologic DMARD: Methotrexate 20 mg oral weekly (2013-1/2020; d/blake due to hair loss) Leflunomide 20 mg oral daily (1/2020-present)  Biologic:  Other: Prolia 11/2019-  Broke wrist in 2006, never felt it healed entirely, went for Xrays and told she had arthritis. Started methotrexate      [facility] Riley for ILD was told lung disease is stable. Cough is much better. No morning stiffness. Very happy with current joint control, denies significant pain. Occasional twinges in left wrist.    No diarrhea or infections on the leflunomide. REVIEW OF SYSTEMS    A comprehensive review of systems was performed and pertinent results are documented in the HPI, review of systems is otherwise non-contributory. PAST MEDICAL HISTORY    Past Medical History:   Diagnosis Date    Osteoporosis     Rheumatoid arthritis ([address]         History reviewed. No pertinent surgical history.     FAMILY HISTORY    Family History   Problem Relation Age of Onset    No Known Problems Mother     No Known Problems Father        SOCIAL HISTORY    Social History     Tobacco Use    Smoking status: Former Smoker    Smokeless tobacco: Never Used    Tobacco comment: Quit 3 Years Ago   Substance Use Topics    Alcohol use: Yes     Comment: Social    Drug use: No       MEDICATIONS    Current Outpatient Medications   Medication Sig Dispense Refill    benzonatate (TESSALON) 200 mg capsule TAKE 1 CAPSULE BY MOUTH THREE TIMES DAILY AS NEEDED      fluticasone/vilanterol (BREO ELLIPTA IN) Take  by inhalation.  leflunomide (ARAVA) 20 mg tablet Take 1 Tab by mouth daily. 90 Tab 1    denosumab (PROLIA) 60 mg/mL injection 60 mg by SubCUTAneous route once. Indications: patient gets this injection every 6 months      calcium carbonate-vitamin D3 (CALTRATE 600 + D) 600 mg (1,500 mg)-800 unit chew Take 2 Tabs by mouth daily. 180 Tab 3    predniSONE (DELTASONE) 10 mg tablet Take 2 pills daily for 2 weeks then take 1 pill daily for 1 week then stop 40 Tab 0       ALLERGIES    No Known Allergies    PHYSICAL EXAMINATION  Visit Vitals  /84 (BP 1 Location: Right arm, BP Patient Position: Sitting)   Pulse 91   Temp 97.8 °F (36.6 °C) (Oral)   Resp 18   Ht 5' 1\" (1.549 m)   Wt 151 lb 12.8 oz (68.9 kg)   SpO2 96%   BMI 28.68 kg/m²     General:  The patient is well developed, well nourished, alert, and in no apparent distress. Eyes: Sclera are anicteric. No conjunctival injection. HEENT:  Oropharynx is clear. No oral ulcers. Adequate salivary pooling. No cervical or supraclavicular lymphadenopathy. Lungs: Mild bibasilar crackles with bilateral crackles anteriorly, without wheeze or stridor. Normal respiratory effort. Cor:  Regular rate and rhythm. No murmurs rubs or gallops. Abdomen: Soft, non-tender, without hepatomegaly or masses. Extremities: No calf tenderness or edema. Warm and well perfused. Skin:  No significant abnormalities. Neuro: Nonfocal  Musculoskeletal:    A comprehensive musculoskeletal exam was performed for all joints of each upper and lower extremity and assessed for swelling, tenderness and range of motion. Results are documented as below:  Limited flexion/extension bilateral wrists, persistent trace left wrist synovitis and mild left 1st MCP synovitis. Still resolved ankle synovitis, hamertoes of feet without MTP squeeze tenderness.         Joint Count 12/8/2020 1/27/2020 7/26/2019 4/26/2019 3/27/2019   Patient pain (0-100) 5 0 0 0 75   MHAQ 0 0 0 0 0.25   Left elbow - Tender - - 0 - -   Left elbow - Swollen - 0 0 - -   Left wrist- Tender 1 0 - - -   Left wrist- Swollen 1 - - - -   Left 1st MCP - Tender 1 - - - -   Left 1st MCP - Swollen 1 - - - -   Left 4th MCP - Tender - - - 0 -   Left 4th MCP - Swollen - - - 0 -   Left thumb IP - Swollen - - - - 0   Left 3rd PIP - Tender - - - - 0   Right wrist- Tender 0 - - - 1   Right wrist- Swollen 0 - - - 1   Tender Joint Count (Total) 2 0 0 0 1   Swollen Joint Count (Total) 2 0 0 0 1   Physician Assessment (0-10) 3 0 0 0 4   Patient Assessment (0-10) 0 0 0 0 5   CDAI Total (calculated) 7 0 0 0 11       DATA REVIEW    Prior medical records were reviewed and if applicable are summarized as below:    Labs:   11/2020: Vit D 45; TSH WNL, Ca 10.7->10.0, BMP WNL  7/2020:  CBC WNL, Ca 10.9, Cr 0.84, CMP WNL; Hep B SAg neg, SAb neg, IgM cAb+, total cAb neg, eAg neg; QFN neg  1/2020: Cbc, cmp unremarkable  10/2019: cbc with thrombocytosis, elevated PMNs, lymph, monocytes, CMP normal  7/2019: Cbc, cmp unremarkable  5/2019: BMP normal  3/2019: CCP, RF positive, ESR, CRP normal, Hep B, C, quant gold negative  2/2019: cbc, CMP unremarkable    Imaging:   CT chest (2/6/21):    1. The lungs demonstrate persistent abnormality bilaterally this is most  pronounced in the periphery of the upper lobes anteriorly does involve all  lobes. Findings are consistent with peripheral areas of fibrosis and  honeycombing. The pattern is similar. There is a fibrosis appears somewhat  better defined in some of the honeycomb cysts are slightly larger. This is only  minimal change. 2. Overall the patient effected a better degree of inspiration on today's  examination mild areas of bronchiectasis are noted in the lower lobes. This is  better seen on this study than on the previous exam and is mild but may have  progressed slightly. 3. No adenopathy  CT chest (11/11/19): 1.  There are peripheral reticular opacities with honeycombing. This is most  pronounced anteriorly in each upper lobe left greater than right also involves  the lower lobes. These findings are consistent with pulmonary fibrosis. Pattern  is not typical for UIP. 2. Adenopathy as described above. This could be reactive. Exact etiology is  uncertain. Follow-up exam in 3-6 months may yield more information. Hand X-rays (3/2019): Personally reviewed images. + erosions consistent with RA and severe OA  Foot x-rays (3/2019): Personally reviewed images. + erosions  DEXA (3/2019): T score -2.6 at the left radius    ASSESSMENT AND PLAN    A 76 y.o. female with hx of osteoporosis, seropositive erosive rheumatoid arthritis on leflunomide 20 mg oral daily presents for a follow up visit. She is pleased with joint control with still low disease activity by CDAI, and stable ILD. # Seropositive erosive Rheumatoid arthritis:  - continue leflunomide 20 mg oral daily. 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 Aye Lockett    # Osteoporosis:  - s/p Prolia in 4/2020, 11/2020. Re-dose in May 2021    # Hand osteoarthritis: largely asymptomatic. Erosive osteoarthritis. - will continue to monitor, reviewed diclofenac gel as needed    #Hypertension  -Pt agrees to reach out to PCP, reduce salt in diet    # Medication Toxicity Monitoring:  - cbc, cmp q3mo  - Hepatitis B, C: negative 3/2019--false positive Hep B cAb  - Quant gold: negative 3/2019  - Immunizations: We discussed receiving influenza, Prevar-13, and Pneumovax-23 vaccines as per the CDC guidelines for immunosuppressed patients. - bone health: see above    RTC in 3 months    Patient Instructions   1. Joints look OK, a touch of inflammation in the left wrist in particular, but if you're comfortable we can continue this regimen. 2. Labs in April, in preparation for your Prolia shot in May. 3. Return in 3 months. The patient voiced understanding of the aforementioned assessment and plan. Summary of plan was provided in the After Visit Summary patient instructions. I also provided education about MyChart setup and utility. [facility] [address] [facility]  has a reminder for a \"due or due soon\" health maintenance. I have asked that she contact her primary care provider for follow-up on this health maintenance.     TODAY'S ORDERS    Orders Placed This Encounter    XR HAND RT MIN 3 V    XR HAND LT MIN 3 V    XR FOOT RT MIN 3 V    XR FOOT LT MIN 3 V    C REACTIVE PROTEIN, QT    CBC WITH AUTOMATED DIFF    METABOLIC PANEL, COMPREHENSIVE    SED RATE (ESR)    benzonatate (TESSALON) 200 mg capsule    fluticasone/vilanterol (BREO ELLIPTA IN)     Face to face time: 25 minutes  Note preparation and records review day of service: 10 minutes  Total provider time day of service: 35 minutes      Future Appointments   Date Time Provider Catherine Reynaga   5/10/2021 11:30 AM [facility] [place]   6/8/2021  9:40 AM MD Adrian Elliott MD    Adult Rheumatology   [facility]  [facility] of [facility], [address] [facility], [address] [facility] [facility]   Phone [phone]  Fax [phone]

## 2021-03-08 NOTE — PATIENT INSTRUCTIONS
1. Joints look OK, a touch of inflammation in the left wrist in particular, but if you're comfortable we can continue this regimen. 2. Labs in April, in preparation for your Prolia shot in May. 3. Return in 3 months.

## 2021-03-08 NOTE — Clinical Note
She's due for Prolia this May, but it looks like she's already scheduled at [address] [place] [address] for this--do you know if we still need to send a new order form?

## 2021-03-08 NOTE — PROGRESS NOTES
Chief Complaint   Patient presents with    Arthritis     1. Have you been to the ER, urgent care clinic since your last visit? Hospitalized since your last visit? No    2. Have you seen or consulted any other health care providers outside of the [address] [place] [address] since your last visit? Include any pap smears or colon screening.  No

## 2021-05-10 ENCOUNTER — HOSPITAL ENCOUNTER (OUTPATIENT)
Dept: INFUSION THERAPY | Age: 66
Discharge: HOME | End: 2021-05-10
Payer: MEDICARE

## 2021-05-10 VITALS
WEIGHT: 153.9 LBS | RESPIRATION RATE: 16 BRPM | SYSTOLIC BLOOD PRESSURE: 159 MMHG | TEMPERATURE: 97.2 F | HEART RATE: 95 BPM | BODY MASS INDEX: 27.27 KG/M2 | DIASTOLIC BLOOD PRESSURE: 88 MMHG | OXYGEN SATURATION: 96 % | HEIGHT: 63 IN

## 2021-05-10 LAB
ALBUMIN SERPL-MCNC: 3.7 G/DL (ref 3.5–5)
ANION GAP SERPL CALC-SCNC: 3 MMOL/L (ref 5–15)
BUN SERPL-MCNC: 16 MG/DL (ref 6–20)
BUN/CREAT SERPL: 17 (ref 12–20)
CALCIUM SERPL-MCNC: 10.3 MG/DL (ref 8.5–10.1)
CHLORIDE SERPL-SCNC: 104 MMOL/L (ref 97–108)
CO2 SERPL-SCNC: 29 MMOL/L (ref 21–32)
CREAT SERPL-MCNC: 0.96 MG/DL (ref 0.55–1.02)
GLUCOSE SERPL-MCNC: 99 MG/DL (ref 65–100)
MAGNESIUM SERPL-MCNC: 2.3 MG/DL (ref 1.6–2.4)
PHOSPHATE SERPL-MCNC: 2.9 MG/DL (ref 2.6–4.7)
POTASSIUM SERPL-SCNC: 4 MMOL/L (ref 3.5–5.1)
SODIUM SERPL-SCNC: 136 MMOL/L (ref 136–145)

## 2021-05-10 PROCEDURE — 83735 ASSAY OF MAGNESIUM: CPT

## 2021-05-10 PROCEDURE — 96372 THER/PROPH/DIAG INJ SC/IM: CPT

## 2021-05-10 PROCEDURE — 74011250636 HC RX REV CODE- 250/636: Performed by: INTERNAL MEDICINE

## 2021-05-10 PROCEDURE — 80069 RENAL FUNCTION PANEL: CPT

## 2021-05-10 PROCEDURE — 36415 COLL VENOUS BLD VENIPUNCTURE: CPT

## 2021-05-10 RX ADMIN — DENOSUMAB 60 MG: 60 INJECTION SUBCUTANEOUS at 12:24

## 2021-06-09 ENCOUNTER — TELEPHONE (OUTPATIENT)
Dept: RHEUMATOLOGY | Age: 66
End: 2021-06-09

## 2021-06-09 NOTE — TELEPHONE ENCOUNTER
----- Message from Brina Cunningham MD sent at 6/9/2021  7:27 AM EDT -----  Regarding: labs, rescheduling  She no-showed for yesterday's visit which is a bit unlike her. . can you let her know that we do need to update labs, unfortunately last month the infusion center only terrlel the osteoporosis labs drawn, not the liver test and blood counts that we need to follow her leflunomide safely. Can you coordinate with her, and have her reschedule?

## 2021-06-17 ENCOUNTER — OFFICE VISIT (OUTPATIENT)
Dept: RHEUMATOLOGY | Age: 66
End: 2021-06-17
Payer: MEDICARE

## 2021-06-17 VITALS
WEIGHT: 153 LBS | OXYGEN SATURATION: 95 % | HEART RATE: 91 BPM | SYSTOLIC BLOOD PRESSURE: 161 MMHG | RESPIRATION RATE: 20 BRPM | BODY MASS INDEX: 27.11 KG/M2 | DIASTOLIC BLOOD PRESSURE: 94 MMHG | TEMPERATURE: 98.3 F | HEIGHT: 63 IN

## 2021-06-17 DIAGNOSIS — M81.0 AGE-RELATED OSTEOPOROSIS WITHOUT CURRENT PATHOLOGICAL FRACTURE: ICD-10-CM

## 2021-06-17 DIAGNOSIS — Z79.899 ONGOING USE OF POSSIBLY TOXIC MEDICATION: ICD-10-CM

## 2021-06-17 DIAGNOSIS — M05.9 SEROPOSITIVE RHEUMATOID ARTHRITIS (HCC): Primary | ICD-10-CM

## 2021-06-17 PROCEDURE — G8536 NO DOC ELDER MAL SCRN: HCPCS | Performed by: INTERNAL MEDICINE

## 2021-06-17 PROCEDURE — 1101F PT FALLS ASSESS-DOCD LE1/YR: CPT | Performed by: INTERNAL MEDICINE

## 2021-06-17 PROCEDURE — 3017F COLORECTAL CA SCREEN DOC REV: CPT | Performed by: INTERNAL MEDICINE

## 2021-06-17 PROCEDURE — G8427 DOCREV CUR MEDS BY ELIG CLIN: HCPCS | Performed by: INTERNAL MEDICINE

## 2021-06-17 PROCEDURE — G8419 CALC BMI OUT NRM PARAM NOF/U: HCPCS | Performed by: INTERNAL MEDICINE

## 2021-06-17 PROCEDURE — 99215 OFFICE O/P EST HI 40 MIN: CPT | Performed by: INTERNAL MEDICINE

## 2021-06-17 PROCEDURE — G0463 HOSPITAL OUTPT CLINIC VISIT: HCPCS | Performed by: INTERNAL MEDICINE

## 2021-06-17 PROCEDURE — G8510 SCR DEP NEG, NO PLAN REQD: HCPCS | Performed by: INTERNAL MEDICINE

## 2021-06-17 PROCEDURE — 1090F PRES/ABSN URINE INCON ASSESS: CPT | Performed by: INTERNAL MEDICINE

## 2021-06-17 RX ORDER — ONDANSETRON 2 MG/ML
8 INJECTION INTRAMUSCULAR; INTRAVENOUS AS NEEDED
Status: CANCELLED | OUTPATIENT
Start: 2021-11-08

## 2021-06-17 RX ORDER — HYDROCORTISONE SODIUM SUCCINATE 100 MG/2ML
100 INJECTION, POWDER, FOR SOLUTION INTRAMUSCULAR; INTRAVENOUS AS NEEDED
Status: CANCELLED | OUTPATIENT
Start: 2021-11-08

## 2021-06-17 RX ORDER — ALBUTEROL SULFATE 0.83 MG/ML
2.5 SOLUTION RESPIRATORY (INHALATION) AS NEEDED
Status: CANCELLED
Start: 2021-11-08

## 2021-06-17 RX ORDER — ACETAMINOPHEN 325 MG/1
650 TABLET ORAL AS NEEDED
Status: CANCELLED
Start: 2021-11-08

## 2021-06-17 RX ORDER — EPINEPHRINE 1 MG/ML
0.3 INJECTION, SOLUTION, CONCENTRATE INTRAVENOUS AS NEEDED
Status: CANCELLED | OUTPATIENT
Start: 2021-11-08

## 2021-06-17 RX ORDER — DIPHENHYDRAMINE HYDROCHLORIDE 50 MG/ML
25 INJECTION, SOLUTION INTRAMUSCULAR; INTRAVENOUS AS NEEDED
Status: CANCELLED
Start: 2021-11-08

## 2021-06-17 RX ORDER — DIPHENHYDRAMINE HYDROCHLORIDE 50 MG/ML
50 INJECTION, SOLUTION INTRAMUSCULAR; INTRAVENOUS AS NEEDED
Status: CANCELLED
Start: 2021-11-08

## 2021-06-17 NOTE — PROGRESS NOTES
Chief Complaint   Patient presents with    Arthritis     1. Have you been to the ER, urgent care clinic since your last visit? Hospitalized since your last visit? No    2. Have you seen or consulted any other health care providers outside of the [address] [place] [address] since your last visit? Include any pap smears or colon screening.  No

## 2021-06-17 NOTE — PATIENT INSTRUCTIONS
1. Continue leflunomide daily for now. 2. Repeat labs in 3 months (September), and then again in November before your Prolia injection. 3. Return in mid-November and we'll sync up your return visit and your Prolia injection for the same day.

## 2021-06-17 NOTE — PROGRESS NOTES
REASON FOR VISIT    Patient presents for a follow up visit for seropositive RA    HISTORY OF DISEASE: Seropositive Rheumatoid Arthritis; erosive    Year of diagnosis: 2013  First visit with this clinic:  3/2019  Cumulative disease manifestations:   Positive serologies/labs: RF, CCP  Negative serologies/labs:   Extra-articular comorbidities: Pulmonary fibrosis   Other co-morbidities:  Osteoporosis, low-grade hyperCa  Relapses: 12/2018 but on not treatment     Past treatment history:  Prednisone:   Non-biologic DMARD: Methotrexate 20 mg oral weekly (2013-1/2020; d/blake due to hair loss) Leflunomide 20 mg oral daily (1/2020-present)  Biologic:  Other: Prolia 11/2019-  Broke wrist in 2006, never felt it healed entirely, went for Xrays and told she had arthritis. Started methotrexate      HISTORY OF PRESENT ILLNESS     Very happy with joint control, no joint pain \"at all. \" Rare use of voltaren gel on the hands. Last saw Dr. Ángela Mckeon around April, says lungs are stable. As long as she takes benzonatate she has no cough. She admits to only intermittent Breo as overall feels breathing is good. Walks for 1.5 hours/day in the neighborhood. No interval infections, no diarrhea with leflunomide. REVIEW OF SYSTEMS    A comprehensive review of systems was performed and pertinent results are documented in the HPI, review of systems is otherwise non-contributory. PAST MEDICAL HISTORY    Past Medical History:   Diagnosis Date    Osteoporosis     Rheumatoid arthritis ([address]         History reviewed. No pertinent surgical history.     FAMILY HISTORY    Family History   Problem Relation Age of Onset    No Known Problems Mother     No Known Problems Father        SOCIAL HISTORY    Social History     Tobacco Use    Smoking status: Former Smoker    Smokeless tobacco: Never Used    Tobacco comment: Quit 3 Years Ago   Substance Use Topics    Alcohol use: Yes     Comment: Social    Drug use: No       MEDICATIONS    Current Outpatient Medications   Medication Sig Dispense Refill    benzonatate (TESSALON) 200 mg capsule TAKE 1 CAPSULE BY MOUTH THREE TIMES DAILY AS NEEDED      fluticasone/vilanterol (BREO ELLIPTA IN) Take  by inhalation.  leflunomide (ARAVA) 20 mg tablet Take 1 Tab by mouth daily. 90 Tab 1    denosumab (PROLIA) 60 mg/mL injection 60 mg by SubCUTAneous route once. Indications: patient gets this injection every 6 months      calcium carbonate-vitamin D3 (CALTRATE 600 + D) 600 mg (1,500 mg)-800 unit chew Take 2 Tabs by mouth daily. 180 Tab 3    predniSONE (DELTASONE) 10 mg tablet Take 2 pills daily for 2 weeks then take 1 pill daily for 1 week then stop (Patient not taking: Reported on 6/17/2021) 40 Tab 0       ALLERGIES    No Known Allergies    PHYSICAL EXAMINATION  Visit Vitals  BP (!) 161/94 (BP 1 Location: Left upper arm, BP Patient Position: Sitting)   Pulse 91   Temp 98.3 °F (36.8 °C) (Oral)   Resp 20   Ht 5' 3\" (1.6 m)   Wt 153 lb (69.4 kg)   SpO2 95%   BMI 27.10 kg/m²     General:  The patient is well developed, well nourished, alert, and in no apparent distress. Eyes: Sclera are anicteric. No conjunctival injection. HEENT:  Oropharynx is clear. No oral ulcers. Adequate salivary pooling. No cervical or supraclavicular lymphadenopathy. Lungs: Stable mild bibasilar crackles with bilateral crackles anteriorly, without wheeze or stridor. Normal respiratory effort. Cor:  Regular rate and rhythm. No murmurs rubs or gallops. Abdomen: Soft, non-tender, without hepatomegaly or masses. Extremities: No calf tenderness or edema. Warm and well perfused. Skin:  No significant abnormalities. No petechial, purpuric, or psoriaform rashes   Neuro: Nonfocal  Musculoskeletal:    A comprehensive musculoskeletal exam was performed for all joints of each upper and lower extremity and assessed for swelling, tenderness and range of motion.  Results are documented as below:  Limited flexion/extension bilateral wrists, interval resolved left wrist and left 1st MCP synovitis. Still resolved ankle synovitis, hamertoes of feet without MTP squeeze tenderness. Joint Count 12/8/2020 1/27/2020 7/26/2019 4/26/2019 3/27/2019   Patient pain (0-100) 5 0 0 0 75   MHAQ 0 0 0 0 0.25   Left elbow - Tender - - 0 - -   Left elbow - Swollen - 0 0 - -   Left wrist- Tender 1 0 - - -   Left wrist- Swollen 1 - - - -   Left 1st MCP - Tender 1 - - - -   Left 1st MCP - Swollen 1 - - - -   Left 4th MCP - Tender - - - 0 -   Left 4th MCP - Swollen - - - 0 -   Left thumb IP - Swollen - - - - 0   Left 3rd PIP - Tender - - - - 0   Right wrist- Tender 0 - - - 1   Right wrist- Swollen 0 - - - 1   Tender Joint Count (Total) 2 0 0 0 1   Swollen Joint Count (Total) 2 0 0 0 1   Physician Assessment (0-10) 3 0 0 0 4   Patient Assessment (0-10) 0 0 0 0 5   CDAI Total (calculated) 7 0 0 0 11       DATA REVIEW    Prior medical records were reviewed and if applicable are summarized as below:    Labs:   6/9/21: ESR 75, LFTs WNL, CRP 9mg/L  5/10/21: Cr 0.96, Ca 10.3, no LFTs or CBC.  11/2020: Vit D 45; TSH WNL, Ca 10.7->10.0, BMP WNL  7/2020:  CBC WNL, Ca 10.9, Cr 0.84, CMP WNL; Hep B SAg neg, SAb neg, IgM cAb+, total cAb neg, eAg neg; QFN neg  1/2020: Cbc, cmp unremarkable  10/2019: cbc with thrombocytosis, elevated PMNs, lymph, monocytes, CMP normal  7/2019: Cbc, cmp unremarkable  5/2019: BMP normal  3/2019: CCP, RF positive, ESR, CRP normal, Hep B, C, quant gold negative  2/2019: cbc, CMP unremarkable    Imaging:   CT chest (2/6/21):    1. The lungs demonstrate persistent abnormality bilaterally this is most  pronounced in the periphery of the upper lobes anteriorly does involve all  lobes. Findings are consistent with peripheral areas of fibrosis and  honeycombing. The pattern is similar. There is a fibrosis appears somewhat  better defined in some of the honeycomb cysts are slightly larger. This is only  minimal change.   2. Overall the patient effected a better degree of inspiration on today's  examination mild areas of bronchiectasis are noted in the lower lobes. This is  better seen on this study than on the previous exam and is mild but may have  progressed slightly. 3. No adenopathy  CT chest (11/11/19): 1. There are peripheral reticular opacities with honeycombing. This is most  pronounced anteriorly in each upper lobe left greater than right also involves  the lower lobes. These findings are consistent with pulmonary fibrosis. Pattern  is not typical for UIP. 2. Adenopathy as described above. This could be reactive. Exact etiology is  uncertain. Follow-up exam in 3-6 months may yield more information. Hand X-rays (3/2019): Personally reviewed images. + erosions consistent with RA and severe OA  Foot x-rays (3/2019): Personally reviewed images. + erosions  DEXA (3/2019): T score -2.6 at the left radius    ASSESSMENT AND PLAN    A 76 y.o. female with hx of osteoporosis, seropositive erosive rheumatoid arthritis on leflunomide 20 mg oral daily presents for a follow up visit with inflammatory arthritis currently in clinical remission. She remains very pleased with joint control and lung disease is grossly stable, continuing leflunomide. Will continue Prolia every 6 months and coordinate next followup with her infusion. # Seropositive erosive Rheumatoid arthritis:  - continue leflunomide 20 mg oral daily. 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 Wellington Jeans    # Osteoporosis:  - s/p Prolia in 4/2020, 11/2020, 5/2021. Re-dose in November 2021    # Hand osteoarthritis: largely asymptomatic. Erosive osteoarthritis. - will continue to monitor, cont diclofenac gel as needed    #Hypertension  -Pt agrees to reach out to PCP, reduce salt in diet    # Medication Toxicity Monitoring:  - cbc, cmp q3mo  - Hepatitis B, C: negative 3/2019--false positive Hep B cAb  - Quant gold: negative 3/2019  - bone health: see above    RTC in 6 months    Patient Instructions   1.  Continue leflunomide daily for now. 2. Repeat labs in 3 months (September), and then again in November before your Prolia injection. 3. Return in mid-November and we'll sync up your return visit and your Prolia injection for the same day. The patient voiced understanding of the aforementioned assessment and plan. Summary of plan was provided in the After Visit Summary patient instructions. I also provided education about MyChart setup and utility. Ms. [address] [facility],  has a reminder for a \"due or due soon\" health maintenance. I have asked that she contact her primary care provider for follow-up on this health maintenance.     TODAY'S ORDERS    Orders Placed This Encounter    C REACTIVE PROTEIN, QT    CBC WITH AUTOMATED DIFF    METABOLIC PANEL, COMPREHENSIVE    SED RATE (ESR)    C REACTIVE PROTEIN, QT    CBC WITH AUTOMATED DIFF    METABOLIC PANEL, COMPREHENSIVE    SED RATE (ESR)     Face to face time: 25 minutes  Note preparation and records review day of service: 20 minutes  Total provider time day of service: 45 minutes       Future Appointments   Date Time Provider Catherine Reynaga   9/20/2021  9:20 AM Elvia Cortez MD [facility] BS AMB   11/8/2021  3:00 PM SS INF7 CH2 <1H JOLENE Aguero MD    Adult Rheumatology   [facility]  A Part of [facility], [address] [place] [address] Road   Phone [phone]  Fax [phone]

## 2021-06-17 NOTE — Clinical Note
Could you apply to get her Prolia switched over to here, so she can sync up her followup and her Prolia injection on the same day in November? Looks like she got confused and scheduled a followup in 3 months. . we want her to do labs in September and then again in November, but I don't need to see her until she returns for followup and Prolia in November.

## 2021-07-17 DIAGNOSIS — M05.9 SEROPOSITIVE RHEUMATOID ARTHRITIS (HCC): ICD-10-CM

## 2021-07-19 ENCOUNTER — OFFICE VISIT (OUTPATIENT)
Dept: FAMILY MEDICINE CLINIC | Age: 66
End: 2021-07-19
Payer: MEDICARE

## 2021-07-19 VITALS
HEART RATE: 88 BPM | OXYGEN SATURATION: 99 % | BODY MASS INDEX: 27.32 KG/M2 | WEIGHT: 154.2 LBS | RESPIRATION RATE: 16 BRPM | DIASTOLIC BLOOD PRESSURE: 98 MMHG | HEIGHT: 63 IN | TEMPERATURE: 98 F | SYSTOLIC BLOOD PRESSURE: 150 MMHG

## 2021-07-19 DIAGNOSIS — Z13.220 SCREENING FOR CHOLESTEROL LEVEL: ICD-10-CM

## 2021-07-19 DIAGNOSIS — I10 BENIGN ESSENTIAL HTN: Primary | ICD-10-CM

## 2021-07-19 DIAGNOSIS — I10 ESSENTIAL (PRIMARY) HYPERTENSION: ICD-10-CM

## 2021-07-19 PROCEDURE — 1090F PRES/ABSN URINE INCON ASSESS: CPT | Performed by: STUDENT IN AN ORGANIZED HEALTH CARE EDUCATION/TRAINING PROGRAM

## 2021-07-19 PROCEDURE — 3017F COLORECTAL CA SCREEN DOC REV: CPT | Performed by: STUDENT IN AN ORGANIZED HEALTH CARE EDUCATION/TRAINING PROGRAM

## 2021-07-19 PROCEDURE — G8536 NO DOC ELDER MAL SCRN: HCPCS | Performed by: STUDENT IN AN ORGANIZED HEALTH CARE EDUCATION/TRAINING PROGRAM

## 2021-07-19 PROCEDURE — G8432 DEP SCR NOT DOC, RNG: HCPCS | Performed by: STUDENT IN AN ORGANIZED HEALTH CARE EDUCATION/TRAINING PROGRAM

## 2021-07-19 PROCEDURE — G8419 CALC BMI OUT NRM PARAM NOF/U: HCPCS | Performed by: STUDENT IN AN ORGANIZED HEALTH CARE EDUCATION/TRAINING PROGRAM

## 2021-07-19 PROCEDURE — G0463 HOSPITAL OUTPT CLINIC VISIT: HCPCS | Performed by: STUDENT IN AN ORGANIZED HEALTH CARE EDUCATION/TRAINING PROGRAM

## 2021-07-19 PROCEDURE — G8427 DOCREV CUR MEDS BY ELIG CLIN: HCPCS | Performed by: STUDENT IN AN ORGANIZED HEALTH CARE EDUCATION/TRAINING PROGRAM

## 2021-07-19 PROCEDURE — 99213 OFFICE O/P EST LOW 20 MIN: CPT | Performed by: STUDENT IN AN ORGANIZED HEALTH CARE EDUCATION/TRAINING PROGRAM

## 2021-07-19 PROCEDURE — 1101F PT FALLS ASSESS-DOCD LE1/YR: CPT | Performed by: STUDENT IN AN ORGANIZED HEALTH CARE EDUCATION/TRAINING PROGRAM

## 2021-07-19 RX ORDER — HYDROCHLOROTHIAZIDE 25 MG/1
25 TABLET ORAL DAILY
Qty: 30 TABLET | Refills: 0 | Status: SHIPPED | OUTPATIENT
Start: 2021-07-19 | End: 2021-08-06 | Stop reason: SDUPTHER

## 2021-07-19 RX ORDER — LEFLUNOMIDE 20 MG/1
TABLET ORAL
Qty: 90 TABLET | Refills: 1 | Status: SHIPPED | OUTPATIENT
Start: 2021-07-19 | End: 2021-07-26

## 2021-07-19 NOTE — PROGRESS NOTES
Chief Complaint   Patient presents with    Hypertension     First noticed high bp reading at dentist 160/91. Has been checking it at home. Went to ER yesterday ([facility]) for a high reading at home. States had an EKG that was normal.ER gave her lisinopril-hctz and her sister told her not to take it. Todays reading at home was 170/104     1. Have you been to the ER, urgent care clinic since your last visit? Hospitalized since your last visit? Yes--went to [facility] ER yesterday. 2. Have you seen or consulted any other health care providers outside of the [address] [place] [address] since your last visit? Include any pap smears or colon screening.  no

## 2021-07-19 NOTE — PROGRESS NOTES
Subjective   CC:  Hanny Mohr is a 76 y.o. female with Stage 2 CKD and RA who presents for blood pressure concerns. She states she has a long standing history of elevated BP, but in past it was thought to be white coat hypertension. More recently, she was seen by Dr. Leroy Smyth, who recommended she check her BP at home. Occasionally feels dizzy, which has been occurring more frequently. Her home BPs have typically been in the 160s/80s. Yesterday, she was seen by dentist and BP was 170/91 at the time, prompting her to visit ED. She states she was asymptomatic, but felt that her BP was significantly elevated. EKG and labs were wnl, started on Lisinopril-HCTZ daily. She consulted with her sister who is a nurse and had concerns about Lisinopril given a family history of angioedema as an ASE to the medication and concern that Lisinopril is a concerning medication in the  population. Denies HA, changes in vision, chest pain, palpitations. Review of Systems   Constitutional: Negative for activity change, chills and fever. Eyes: Negative for visual disturbance. Respiratory: Negative for cough and chest tightness. Cardiovascular: Negative for chest pain, palpitations and leg swelling. Gastrointestinal: Negative for abdominal pain. Neurological: Positive for dizziness. Negative for headaches. Past Medical History  Past Medical History:   Diagnosis Date    Age-related osteoporosis without current pathological fracture 6/17/2021    Osteoporosis     Rheumatoid arthritis ([facility] [address]        Current Medications  Current Outpatient Medications   Medication Sig Dispense Refill    leflunomide (ARAVA) 20 mg tablet TAKE 1 TABLET BY MOUTH DAILY 90 Tablet 1    hydroCHLOROthiazide (HYDRODIURIL) 25 mg tablet Take 1 Tablet by mouth daily for 30 days. 30 Tablet 0    fluticasone/vilanterol (BREO ELLIPTA IN) Take  by inhalation.  denosumab (PROLIA) 60 mg/mL injection 60 mg by SubCUTAneous route once. Indications: patient gets this injection every 6 months      calcium carbonate-vitamin D3 (CALTRATE 600 + D) 600 mg (1,500 mg)-800 unit chew Take 2 Tabs by mouth daily. 180 Tab 3    benzonatate (TESSALON) 200 mg capsule TAKE 1 CAPSULE BY MOUTH THREE TIMES DAILY AS NEEDED (Patient not taking: Reported on 7/19/2021)         Allergies  No Known Allergies    Social History   Social History     Socioeconomic History    Marital status:      Spouse name: Not on file    Number of children: Not on file    Years of education: Not on file    Highest education level: Not on file   Occupational History    Not on file   Tobacco Use    Smoking status: Former Smoker    Smokeless tobacco: Never Used    Tobacco comment: Quit 3 Years Ago   Vaping Use    Vaping Use: Never used   Substance and Sexual Activity    Alcohol use: Yes     Comment: Social    Drug use: No    Sexual activity: Never   Other Topics Concern    Not on file   Social History Narrative    Not on file     Social Determinants of Health     Financial Resource Strain:     Difficulty of Paying Living Expenses:    Food Insecurity:     Worried About Running Out of Food in the Last Year:     [address] in the Last Year:    Transportation Needs:     Lack of Transportation (Medical):      Lack of Transportation (Non-Medical):    Physical Activity:     Days of Exercise per Week:     Minutes of Exercise per Session:    Stress:     Feeling of Stress :    Social Connections:     Frequency of Communication with Friends and Family:     Frequency of Social Gatherings with Friends and Family:     Attends Baptism Services:     Active Member of Clubs or Organizations:     Attends Club or Organization Meetings:     Marital Status:    Intimate Partner Violence:     Fear of Current or Ex-Partner:     Emotionally Abused:     Physically Abused:     Sexually Abused:        Family History  Family History   Problem Relation Age of Onset    No Known Problems Mother     No Known Problems Father        Objective   Vital Signs  Visit Vitals  BP (!) 150/98 (BP 1 Location: Left arm, BP Patient Position: Sitting, BP Cuff Size: Adult)   Pulse 88   Temp 98 °F (36.7 °C) (Temporal)   Resp 16   Ht 5' 3\" (1.6 m)   Wt 154 lb 3.2 oz (69.9 kg)   SpO2 99%   BMI 27.32 kg/m²       Physical Examination  Physical Exam  Constitutional:       Appearance: Normal appearance. HENT:      Head: Normocephalic and atraumatic. Nose: Nose normal.      Mouth/Throat:      Mouth: Mucous membranes are moist.      Pharynx: Oropharynx is clear. Cardiovascular:      Rate and Rhythm: Normal rate and regular rhythm. Pulses: Normal pulses. Heart sounds: Normal heart sounds. Pulmonary:      Effort: Pulmonary effort is normal.      Breath sounds: Normal breath sounds. Abdominal:      General: Abdomen is flat. Bowel sounds are normal.      Palpations: Abdomen is soft. Skin:     General: Skin is warm and dry. Neurological:      Mental Status: She is alert. Assessment and Plan   Nakul Pantoja is a 76 y.o. who is here for concern about elevated BP reading. 1. Benign essential HTN  - Started HydroCHLOROthiazide (HYDRODIURIL) 25 mg tablet; Take 1 Tablet by mouth daily for 30 days. Dispense: 30 Tablet; Refill: 0  - Advised patient to maintain BP log for next visit  - Records requested from [facility] for labs/EKG  - Patient advised regarding red flag symptoms to watch for/present to ED with    2. Screening for cholesterol level  - LIPID PANEL; Future  - [facility] will contact her regarding results of lipid panel      Patient is counseled to return to the office if symptoms do not improve as expected. Urgent consultation with the nearest Emergency Department is strongly recommended if condition worsens. Patient is counseled to follow up as recommended and to inform the office if any changes in treatment are recommended.       I discussed this patient with Dr. Ericka Sharif (Attending Physician)       Signed By:  Pamela Beltran MD  Family Medicine Resident

## 2021-07-20 ENCOUNTER — LAB ONLY (OUTPATIENT)
Dept: FAMILY MEDICINE CLINIC | Age: 66
End: 2021-07-20

## 2021-07-20 DIAGNOSIS — I10 ESSENTIAL (PRIMARY) HYPERTENSION: ICD-10-CM

## 2021-07-20 DIAGNOSIS — Z13.220 SCREENING FOR CHOLESTEROL LEVEL: ICD-10-CM

## 2021-07-20 LAB
CHOLEST SERPL-MCNC: 221 MG/DL
HDLC SERPL-MCNC: 62 MG/DL
HDLC SERPL: 3.6 {RATIO} (ref 0–5)
LDLC SERPL CALC-MCNC: 135.8 MG/DL (ref 0–100)
TRIGL SERPL-MCNC: 116 MG/DL (ref ?–150)
VLDLC SERPL CALC-MCNC: 23.2 MG/DL

## 2021-07-26 DIAGNOSIS — M05.9 SEROPOSITIVE RHEUMATOID ARTHRITIS (HCC): ICD-10-CM

## 2021-07-26 RX ORDER — LEFLUNOMIDE 20 MG/1
TABLET ORAL
Qty: 90 TABLET | Refills: 1 | OUTPATIENT
Start: 2021-07-26 | End: 2021-12-13 | Stop reason: SDUPTHER

## 2021-07-26 NOTE — TELEPHONE ENCOUNTER
----- Message from Henry Betancur RN sent at 7/26/2021  2:46 PM EDT -----  Regarding: FW: Dr. Terese Prieto    ----- Message -----  From: Alexis Guerra  Sent: 7/26/2021   2:20 PM EDT  To: [facility] Nurse Pool  Subject: Dr. Winsome Matos Message/Vendor Calls    Caller's first and last name:  Self      Reason for call:  Rx       Callback required yes/no and why:  Yes      Best contact number(s):  [phone]      Details to clarify the request: Pt requesting update on leflunomide (ARAVA) 20 mg tablet, pt states the pharmacy does not have request in for it      Ary Biswas

## 2021-07-27 ENCOUNTER — PATIENT MESSAGE (OUTPATIENT)
Dept: FAMILY MEDICINE CLINIC | Age: 66
End: 2021-07-27

## 2021-07-27 DIAGNOSIS — E78.5 HYPERLIPIDEMIA, UNSPECIFIED HYPERLIPIDEMIA TYPE: Primary | ICD-10-CM

## 2021-07-27 RX ORDER — ATORVASTATIN CALCIUM 40 MG/1
40 TABLET, FILM COATED ORAL DAILY
Qty: 30 TABLET | Refills: 2 | Status: SHIPPED | OUTPATIENT
Start: 2021-07-27 | End: 2021-10-25

## 2021-07-27 NOTE — PROGRESS NOTES
and LDL of 135.8 noted. HDL and TG both wnl. ASCVD risk is 15.4%. D/w patient results of lab and started on Atorvastatin 40mg every day.

## 2021-07-27 NOTE — TELEPHONE ENCOUNTER
Called patient to check in on blood pressure control on HCTZ, states home BP has been well controlled, -137, DBP 76-85 over past 5 days. Doing well, no side effects from medication. Discussed lipid panel findings with MsClaudio Juan Daniel and new dx of hyperlipidemia. Discussed starting Lipitor 40mg as her ASCVD risk 10 yr was 15.4%. Patient was amenable to starting on Lipitor. D/w patient possible ASE, and will eval for SE at our appt on 8/6/21.

## 2021-08-05 NOTE — PROGRESS NOTES
Subjective   CC:  Cristina Rivera is a 78 y.o. female who presents for f/u HTN. Patient states she is doing well. Checking BP daily, with BP b/w 130s-140s SBP, 90s DBP. She feels her BP cuff may be giving her consistently elevated numbers, is interested in a new machine. No chest pain, palp, headache, edema. No side effects from HCTZ, very compliant with taking her meds. Also doing well on Atorvastatin, no side effects reported. Due for mammo and colonoscopy, will order both. Review of Systems   Constitutional: Negative for activity change, chills and fever. Respiratory: Negative for cough, chest tightness and shortness of breath. Cardiovascular: Negative for chest pain, palpitations and leg swelling. Musculoskeletal: Negative for arthralgias and myalgias. Neurological: Negative for dizziness and headaches. Past Medical History  Past Medical History:   Diagnosis Date    Age-related osteoporosis without current pathological fracture 6/17/2021    Osteoporosis     [facility]        Current Medications  Current Outpatient Medications   Medication Sig Dispense Refill    Blood Pressure Test Kit-Medium kit Check blood pressure once daily and maintain a log of blood pressure readings 1 Kit 0    hydroCHLOROthiazide (HYDRODIURIL) 25 mg tablet Take 1 Tablet by mouth daily for 90 days. 30 Tablet 2    atorvastatin (LIPITOR) 40 mg tablet Take 1 Tablet by mouth daily. Indications: excessive fat in the blood 30 Tablet 2    leflunomide (ARAVA) 20 mg tablet TAKE 1 TABLET BY MOUTH DAILY 90 Tablet 1    benzonatate (TESSALON) 200 mg capsule TAKE 1 CAPSULE BY MOUTH THREE TIMES DAILY AS NEEDED      fluticasone/vilanterol (BREO ELLIPTA IN) Take  by inhalation.  denosumab (PROLIA) 60 mg/mL injection 60 mg by SubCUTAneous route once.  Indications: patient gets this injection every 6 months      calcium carbonate-vitamin D3 (CALTRATE 600 + D) 600 mg (1,500 mg)-800 unit chew Take 2 Tabs by mouth daily. 180 Tab 3       Allergies  No Known Allergies    Social History   Social History     Socioeconomic History    Marital status:      Spouse name: Not on file    Number of children: Not on file    Years of education: Not on file    Highest education level: Not on file   Occupational History    Not on file   Tobacco Use    Smoking status: Former Smoker    Smokeless tobacco: Never Used    Tobacco comment: Quit 3 Years Ago   Vaping Use    Vaping Use: Never used   Substance and Sexual Activity    Alcohol use: Yes     Comment: Social    Drug use: No    Sexual activity: Never   Other Topics Concern    Not on file   Social History Narrative    Not on file     Social Determinants of Health     Financial Resource Strain:     Difficulty of Paying Living Expenses:    Food Insecurity:     Worried About Running Out of Food in the Last Year:     Ran Out of Food in the Last Year:    Transportation Needs:     Lack of Transportation (Medical):      Lack of Transportation (Non-Medical):    Physical Activity:     Days of Exercise per Week:     Minutes of Exercise per Session:    Stress:     Feeling of Stress :    Social Connections:     Frequency of Communication with Friends and Family:     Frequency of Social Gatherings with Friends and Family:     Attends [employer] Services:     Active Member of Clubs or Organizations:     Attends Club or Organization Meetings:     Marital Status:    Intimate Partner Violence:     Fear of Current or Ex-Partner:     Emotionally Abused:     Physically Abused:     Sexually Abused:        Family History  Family History   Problem Relation Age of Onset    No Known Problems Mother     No Known Problems Father        Objective   Vital Signs  Visit Vitals  /78 (BP 1 Location: Left upper arm, BP Patient Position: Sitting, BP Cuff Size: Adult)   Pulse 77   Temp 98.3 °F (36.8 °C) (Oral)   Resp 16   Ht 5' 0.5\" (1.537 m)   Wt 153 lb 6.4 oz (69.6 kg) SpO2 96%   BMI 29.47 kg/m²       Physical Examination  Physical Exam  Constitutional:       Appearance: Normal appearance. HENT:      Head: Normocephalic and atraumatic. Eyes:      Extraocular Movements: Extraocular movements intact. Cardiovascular:      Rate and Rhythm: Normal rate and regular rhythm. Heart sounds: Normal heart sounds. Pulmonary:      Effort: Pulmonary effort is normal.      Breath sounds: Normal breath sounds. Abdominal:      General: Abdomen is flat. There is no distension. Palpations: There is no mass. Musculoskeletal:      Cervical back: Normal range of motion and neck supple. Skin:     General: Skin is warm and dry. Neurological:      General: No focal deficit present. Mental Status: She is alert. Mental status is at baseline. Psychiatric:         Mood and Affect: Mood normal.          Assessment and Plan   Carl Antunez is a 78 y.o. who is here for f/u BP. 1. Screen for colon cancer  - REFERRAL TO GASTROENTEROLOGY    2. Encounter for screening mammogram for malignant neoplasm of breast  - [facility] MAMMO BI SCREENING INCL CAD; Future    3. Benign essential HTN  Normotensive, would benefit from new BP cuff, advised that BP cuff ordered might not be covered by insurance, but would recommend she buy a new one as BP   - Blood Pressure Test Kit-Medium kit; Check blood pressure once daily and maintain a log of blood pressure readings  Dispense: 1 Kit; Refill: 0  - hydroCHLOROthiazide (HYDRODIURIL) 25 mg tablet; Take 1 Tablet by mouth daily for 90 days. Dispense: 30 Tablet;  Refill: 2       Health maintenance:     Immunizations:  Last flu shot: 10/2020  Last Tdap: Approx 2016, per pt  Shingrix: 10/2020  PNA vaccines: PCV 13 done 09/2019, PCV 23 done 10/2020  COVID: Cheryl Reeves, second dose 04/18/2021, per pt    Health Maintenance:   Pap test: Approx 2016, wnl  Mammo: Order given 08/06/21  Colorectal cancer screening: Referral given 08/06/21  DEXA: Done in 2019; on Prolia  HCV: Done in 07/2020 - negative for Ab    ASCVD Risk: 15.4%    Return in three months for Medicare Wellness Exam and f/u lipids    Patient is counseled to return to the office if symptoms do not improve as expected. Urgent consultation with the nearest Emergency Department is strongly recommended if condition worsens. Patient is counseled to follow up as recommended and to inform the office if any changes in treatment are recommended.       I discussed this patient with Dr. Sonia Olivera (Attending Physician)       Signed By:  Shanika Benton MD  Family Medicine Resident

## 2021-08-06 ENCOUNTER — OFFICE VISIT (OUTPATIENT)
Dept: FAMILY MEDICINE CLINIC | Age: 66
End: 2021-08-06
Payer: MEDICARE

## 2021-08-06 VITALS
BODY MASS INDEX: 28.96 KG/M2 | SYSTOLIC BLOOD PRESSURE: 119 MMHG | HEART RATE: 77 BPM | OXYGEN SATURATION: 96 % | HEIGHT: 61 IN | DIASTOLIC BLOOD PRESSURE: 78 MMHG | TEMPERATURE: 98.3 F | WEIGHT: 153.4 LBS | RESPIRATION RATE: 16 BRPM

## 2021-08-06 DIAGNOSIS — I10 BENIGN ESSENTIAL HTN: Primary | ICD-10-CM

## 2021-08-06 DIAGNOSIS — Z12.11 SCREEN FOR COLON CANCER: ICD-10-CM

## 2021-08-06 DIAGNOSIS — Z12.31 ENCOUNTER FOR SCREENING MAMMOGRAM FOR MALIGNANT NEOPLASM OF BREAST: ICD-10-CM

## 2021-08-06 PROCEDURE — 3017F COLORECTAL CA SCREEN DOC REV: CPT | Performed by: STUDENT IN AN ORGANIZED HEALTH CARE EDUCATION/TRAINING PROGRAM

## 2021-08-06 PROCEDURE — G0463 HOSPITAL OUTPT CLINIC VISIT: HCPCS | Performed by: STUDENT IN AN ORGANIZED HEALTH CARE EDUCATION/TRAINING PROGRAM

## 2021-08-06 PROCEDURE — 99213 OFFICE O/P EST LOW 20 MIN: CPT | Performed by: STUDENT IN AN ORGANIZED HEALTH CARE EDUCATION/TRAINING PROGRAM

## 2021-08-06 PROCEDURE — G8427 DOCREV CUR MEDS BY ELIG CLIN: HCPCS | Performed by: STUDENT IN AN ORGANIZED HEALTH CARE EDUCATION/TRAINING PROGRAM

## 2021-08-06 PROCEDURE — 1090F PRES/ABSN URINE INCON ASSESS: CPT | Performed by: STUDENT IN AN ORGANIZED HEALTH CARE EDUCATION/TRAINING PROGRAM

## 2021-08-06 PROCEDURE — 1101F PT FALLS ASSESS-DOCD LE1/YR: CPT | Performed by: STUDENT IN AN ORGANIZED HEALTH CARE EDUCATION/TRAINING PROGRAM

## 2021-08-06 PROCEDURE — G8536 NO DOC ELDER MAL SCRN: HCPCS | Performed by: STUDENT IN AN ORGANIZED HEALTH CARE EDUCATION/TRAINING PROGRAM

## 2021-08-06 PROCEDURE — G8432 DEP SCR NOT DOC, RNG: HCPCS | Performed by: STUDENT IN AN ORGANIZED HEALTH CARE EDUCATION/TRAINING PROGRAM

## 2021-08-06 PROCEDURE — G9899 SCRN MAM PERF RSLTS DOC: HCPCS | Performed by: STUDENT IN AN ORGANIZED HEALTH CARE EDUCATION/TRAINING PROGRAM

## 2021-08-06 PROCEDURE — G8419 CALC BMI OUT NRM PARAM NOF/U: HCPCS | Performed by: STUDENT IN AN ORGANIZED HEALTH CARE EDUCATION/TRAINING PROGRAM

## 2021-08-06 RX ORDER — HYDROCHLOROTHIAZIDE 25 MG/1
25 TABLET ORAL DAILY
Qty: 30 TABLET | Refills: 2 | Status: SHIPPED | OUTPATIENT
Start: 2021-08-06 | End: 2021-08-16

## 2021-08-06 RX ORDER — CALCIUM CARBONATE 750 MG/1
TABLET, CHEWABLE ORAL
Qty: 1 KIT | Refills: 0 | Status: SHIPPED | OUTPATIENT
Start: 2021-08-06 | End: 2021-12-03

## 2021-08-06 NOTE — PATIENT INSTRUCTIONS
DASH Diet: Care Instructions  Your Care Instructions     The DASH diet is an eating plan that can help lower your blood pressure. DASH stands for Dietary Approaches to Stop Hypertension. Hypertension is high blood pressure. The DASH diet focuses on eating foods that are high in calcium, potassium, and magnesium. These nutrients can lower blood pressure. The foods that are highest in these nutrients are fruits, vegetables, low-fat dairy products, nuts, seeds, and legumes. But taking calcium, potassium, and magnesium supplements instead of eating foods that are high in those nutrients does not have the same effect. The DASH diet also includes whole grains, fish, and poultry. The DASH diet is one of several lifestyle changes your doctor may recommend to lower your high blood pressure. Your doctor may also want you to decrease the amount of sodium in your diet. Lowering sodium while following the DASH diet can lower blood pressure even further than just the DASH diet alone. Follow-up care is a key part of your treatment and safety. Be sure to make and go to all appointments, and call your doctor if you are having problems. It's also a good idea to know your test results and keep a list of the medicines you take. How can you care for yourself at home? Following the DASH diet  · Eat 4 to 5 servings of fruit each day. A serving is 1 medium-sized piece of fruit, ½ cup chopped or canned fruit, 1/4 cup dried fruit, or 4 ounces (½ cup) of fruit juice. Choose fruit more often than fruit juice. · Eat 4 to 5 servings of vegetables each day. A serving is 1 cup of lettuce or raw leafy vegetables, ½ cup of chopped or cooked vegetables, or 4 ounces (½ cup) of vegetable juice. Choose vegetables more often than vegetable juice. · Get 2 to 3 servings of low-fat and fat-free dairy each day. A serving is 8 ounces of milk, 1 cup of yogurt, or 1 ½ ounces of cheese. · Eat 6 to 8 servings of grains each day.  A serving is 1 slice of bread, 1 ounce of dry cereal, or ½ cup of cooked rice, pasta, or cooked cereal. Try to choose whole-grain products as much as possible. · Limit lean meat, poultry, and fish to 2 servings each day. A serving is 3 ounces, about the size of a deck of cards. · Eat 4 to 5 servings of nuts, seeds, and legumes (cooked dried beans, lentils, and split peas) each week. A serving is 1/3 cup of nuts, 2 tablespoons of seeds, or ½ cup of cooked beans or peas. · Limit fats and oils to 2 to 3 servings each day. A serving is 1 teaspoon of vegetable oil or 2 tablespoons of salad dressing. · Limit sweets and added sugars to 5 servings or less a week. A serving is 1 tablespoon jelly or jam, ½ cup sorbet, or 1 cup of lemonade. · Eat less than 2,300 milligrams (mg) of sodium a day. If you limit your sodium to 1,500 mg a day, you can lower your blood pressure even more. · Be aware that all of these are the suggested number of servings for people who eat 1,800 to 2,000 calories a day. Your recommended number of servings may be different if you need more or fewer calories. Tips for success  · Start small. Do not try to make dramatic changes to your diet all at once. You might feel that you are missing out on your favorite foods and then be more likely to not follow the plan. Make small changes, and stick with them. Once those changes become habit, add a few more changes. · Try some of the following:  ? Make it a goal to eat a fruit or vegetable at every meal and at snacks. This will make it easy to get the recommended amount of fruits and vegetables each day. ? Try yogurt topped with fruit and nuts for a snack or healthy dessert. ? Add lettuce, tomato, cucumber, and onion to sandwiches. ? Combine a ready-made pizza crust with low-fat mozzarella cheese and lots of vegetable toppings. Try using tomatoes, squash, spinach, broccoli, carrots, cauliflower, and onions. ?  Have a variety of cut-up vegetables with a low-fat dip as an appetizer instead of chips and dip. ? Sprinkle sunflower seeds or chopped almonds over salads. Or try adding chopped walnuts or almonds to cooked vegetables. ? Try some vegetarian meals using beans and peas. Add garbanzo or kidney beans to salads. Make burritos and tacos with mashed herrera beans or black beans. Where can you learn more? Go to http://www.fernandes.com/  Enter H967 in the search box to learn more about \"DASH Diet: Care Instructions. \"  Current as of: August 31, 2020               Content Version: 12.8  © 2556-0042 [employer]. Care instructions adapted under license by [employer] (which disclaims liability or warranty for this information). If you have questions about a medical condition or this instruction, always ask your healthcare professional. [address] any warranty or liability for your use of this information.

## 2021-08-06 NOTE — PROGRESS NOTES
Chief Complaint   Patient presents with    Follow Up Chronic Condition     Patient presents to follow up on hypertension. Visit Vitals  /78 (BP 1 Location: Left upper arm, BP Patient Position: Sitting, BP Cuff Size: Adult)   Pulse 77   Temp 98.3 °F (36.8 °C) (Oral)   Resp 16   Ht 5' 0.5\" (1.537 m)   Wt 153 lb 6.4 oz (69.6 kg)   SpO2 96%   BMI 29.47 kg/m²     1. Have you been to the ER, urgent care clinic since your last visit? Hospitalized since your last visit? No     2. Have you seen or consulted any other health care providers outside of the [address] [place] [address] since your last visit? Include any pap smears or colon screening.  No

## 2021-09-15 LAB
ALBUMIN SERPL-MCNC: 4.2 G/DL (ref 3.8–4.8)
ALBUMIN/GLOB SERPL: 1.4 {RATIO} (ref 1.2–2.2)
ALP SERPL-CCNC: 89 IU/L (ref 44–121)
ALT SERPL-CCNC: 75 IU/L (ref 0–32)
AST SERPL-CCNC: 61 IU/L (ref 0–40)
BASOPHILS # BLD AUTO: 0.1 X10E3/UL (ref 0–0.2)
BASOPHILS NFR BLD AUTO: 1 %
BILIRUB SERPL-MCNC: 0.4 MG/DL (ref 0–1.2)
BUN SERPL-MCNC: 15 MG/DL (ref 8–27)
BUN/CREAT SERPL: 17 (ref 12–28)
CALCIUM SERPL-MCNC: 11.2 MG/DL (ref 8.7–10.3)
CHLORIDE SERPL-SCNC: 99 MMOL/L (ref 96–106)
CO2 SERPL-SCNC: 27 MMOL/L (ref 20–29)
CREAT SERPL-MCNC: 0.86 MG/DL (ref 0.57–1)
CRP SERPL-MCNC: 6 MG/L (ref 0–10)
EOSINOPHIL # BLD AUTO: 0.4 X10E3/UL (ref 0–0.4)
EOSINOPHIL NFR BLD AUTO: 4 %
ERYTHROCYTE [DISTWIDTH] IN BLOOD BY AUTOMATED COUNT: 11.2 % (ref 11.7–15.4)
ERYTHROCYTE [SEDIMENTATION RATE] IN BLOOD BY WESTERGREN METHOD: 43 MM/HR (ref 0–40)
GLOBULIN SER CALC-MCNC: 2.9 G/DL (ref 1.5–4.5)
GLUCOSE SERPL-MCNC: 116 MG/DL (ref 65–99)
HCT VFR BLD AUTO: 40.4 % (ref 34–46.6)
HGB BLD-MCNC: 13 G/DL (ref 11.1–15.9)
IMM GRANULOCYTES # BLD AUTO: 0 X10E3/UL (ref 0–0.1)
IMM GRANULOCYTES NFR BLD AUTO: 0 %
LYMPHOCYTES # BLD AUTO: 2.8 X10E3/UL (ref 0.7–3.1)
LYMPHOCYTES NFR BLD AUTO: 24 %
MCH RBC QN AUTO: 31.9 PG (ref 26.6–33)
MCHC RBC AUTO-ENTMCNC: 32.2 G/DL (ref 31.5–35.7)
MCV RBC AUTO: 99 FL (ref 79–97)
MONOCYTES # BLD AUTO: 0.8 X10E3/UL (ref 0.1–0.9)
MONOCYTES NFR BLD AUTO: 7 %
NEUTROPHILS # BLD AUTO: 7.3 X10E3/UL (ref 1.4–7)
NEUTROPHILS NFR BLD AUTO: 64 %
PLATELET # BLD AUTO: 336 X10E3/UL (ref 150–450)
POTASSIUM SERPL-SCNC: 3.7 MMOL/L (ref 3.5–5.2)
PROT SERPL-MCNC: 7.1 G/DL (ref 6–8.5)
RBC # BLD AUTO: 4.07 X10E6/UL (ref 3.77–5.28)
SODIUM SERPL-SCNC: 140 MMOL/L (ref 134–144)
WBC # BLD AUTO: 11.4 X10E3/UL (ref 3.4–10.8)

## 2021-10-25 RX ORDER — ATORVASTATIN CALCIUM 40 MG/1
TABLET, FILM COATED ORAL
Qty: 30 TABLET | Refills: 2 | Status: SHIPPED | OUTPATIENT
Start: 2021-10-25 | End: 2021-12-09 | Stop reason: SDUPTHER

## 2021-10-25 NOTE — TELEPHONE ENCOUNTER
Transaminitis (AST/ALT 61/75) in September 2021 noted. Refilled statin at this time. Reviewd AAFP article regarding transaminitis in pts with statin use, which states \"Elevated transaminase levels and nonalcoholic fatty liver disease are not contraindications to statin use\" and true liver injury caused by statin is approx 1%. Would recheck CMP with next set of labs to ensure that this transaminitis does not worsen with use.

## 2021-10-28 NOTE — PROGRESS NOTES
[facility]  OBIC Note    Date: 2021    Rheumatology OBIC COVID-19 SCREENING  The patient was asked the following questions and answered as documented below:    1. Do you have any symptoms of COVID-19? SOB, coughing, fever, or generally not feeling well? NO  2. Have you been exposed to COVID-19 recently? NO  3. Have you had any recent contact with family/friend that has a pending COVID test? NO    Name: Cassandra Sen  MRN: [id number]       : 1955  Diagnosis: Osteoporosis  Treatment: Prolia  Referring Provider: Dr. Lyndal Hodgkins  Supervising Provider: Dr. Lyndal Hodgkins    Patient arrived to [facility] at 1010. Ms. Frances allergies reviewed and she agreed to receiving today's treatment. Pt. denies new complaints today. Pt seen by Dr. Lyndal Hodgkins in office prior to this injection. Visit Vitals  BP (!) 136/92   Pulse 80   Temp 98 °F (36.7 °C)   Resp 18   SpO2 98%         2021 13:09   WBC 8.6   RBC 3.95   HGB 12.6   HCT 38.3   MCV 97   MCH 31.9   MCHC 32.9   RDW 10.7 (L)   PLATELET 910   NEUTROPHILS 52   Lymphocytes 36   MONOCYTES 6   EOSINOPHILS 5   BASOPHILS 1   IMMATURE GRANULOCYTES 0   ABS. NEUTROPHILS 4.4   ABS. IMM. GRANS. 0.0   Abs Lymphocytes 3.1   ABS. MONOCYTES 0.5   ABS. EOSINOPHILS 0.4   ABS. BASOPHILS 0.1   Sed rate (ESR) 12   Sodium 146 (H)   Potassium 4.1   Chloride 106   CO2 25   Glucose 116 (H)   BUN 14   Creatinine 0.81   BUN/Creatinine ratio 17   Calcium 10.8 (H)   GFR est non-AA 75   GFR est AA 87     Medications given per providers orders:  Administrations This Visit     denosumab (PROLIA) injection 60 mg     Admin Date  2021 Action  Given Dose  60 mg Route  SubCUTAneous Administered By  Tristan Palacios RN            Prolia right upper posterior arm  NDC [id number]  Lot # [id number]  Exp 2024    Ms. Frances tolerated the treatment without complaints and no medication reactions were seen while in presence of this RN.    Patient provided with AVS, which included future appointments and written educational material regarding Prolia. All of the patients questions were answered and then discharged ambulatory from Rheumatology OBIC in stable condition at 1020.      Future Appointments   Date Time Provider Catherine Reynaga   11/18/2021 10:30 AM INFUSIONINJ_RC AOCR BS AMB   12/3/2021  8:00 AM MD PAULINA Enciso BS [facility]   5/19/2022  9:00 AM INFUSIONINJ_RC AOCR BS [facility]     Glenny Dietz RN  November 18, 2021  11:00am

## 2021-11-08 ENCOUNTER — APPOINTMENT (OUTPATIENT)
Dept: INFUSION THERAPY | Age: 66
End: 2021-11-08

## 2021-11-10 DIAGNOSIS — M81.0 AGE-RELATED OSTEOPOROSIS WITHOUT CURRENT PATHOLOGICAL FRACTURE: Primary | ICD-10-CM

## 2021-11-13 LAB
ALBUMIN SERPL-MCNC: 4.4 G/DL (ref 3.8–4.8)
ALBUMIN/GLOB SERPL: 1.6 {RATIO} (ref 1.2–2.2)
ALP SERPL-CCNC: 66 IU/L (ref 44–121)
ALT SERPL-CCNC: 26 IU/L (ref 0–32)
AST SERPL-CCNC: 31 IU/L (ref 0–40)
BASOPHILS # BLD AUTO: 0.1 X10E3/UL (ref 0–0.2)
BASOPHILS NFR BLD AUTO: 1 %
BILIRUB SERPL-MCNC: 0.4 MG/DL (ref 0–1.2)
BUN SERPL-MCNC: 14 MG/DL (ref 8–27)
BUN/CREAT SERPL: 17 (ref 12–28)
CALCIUM SERPL-MCNC: 10.8 MG/DL (ref 8.7–10.3)
CHLORIDE SERPL-SCNC: 106 MMOL/L (ref 96–106)
CO2 SERPL-SCNC: 25 MMOL/L (ref 20–29)
CREAT SERPL-MCNC: 0.81 MG/DL (ref 0.57–1)
CRP SERPL-MCNC: 3 MG/L (ref 0–10)
EOSINOPHIL # BLD AUTO: 0.4 X10E3/UL (ref 0–0.4)
EOSINOPHIL NFR BLD AUTO: 5 %
ERYTHROCYTE [DISTWIDTH] IN BLOOD BY AUTOMATED COUNT: 10.7 % (ref 11.7–15.4)
ERYTHROCYTE [SEDIMENTATION RATE] IN BLOOD BY WESTERGREN METHOD: 12 MM/HR (ref 0–40)
GLOBULIN SER CALC-MCNC: 2.8 G/DL (ref 1.5–4.5)
GLUCOSE SERPL-MCNC: 116 MG/DL (ref 65–99)
HCT VFR BLD AUTO: 38.3 % (ref 34–46.6)
HGB BLD-MCNC: 12.6 G/DL (ref 11.1–15.9)
IMM GRANULOCYTES # BLD AUTO: 0 X10E3/UL (ref 0–0.1)
IMM GRANULOCYTES NFR BLD AUTO: 0 %
LYMPHOCYTES # BLD AUTO: 3.1 X10E3/UL (ref 0.7–3.1)
LYMPHOCYTES NFR BLD AUTO: 36 %
MCH RBC QN AUTO: 31.9 PG (ref 26.6–33)
MCHC RBC AUTO-ENTMCNC: 32.9 G/DL (ref 31.5–35.7)
MCV RBC AUTO: 97 FL (ref 79–97)
MONOCYTES # BLD AUTO: 0.5 X10E3/UL (ref 0.1–0.9)
MONOCYTES NFR BLD AUTO: 6 %
NEUTROPHILS # BLD AUTO: 4.4 X10E3/UL (ref 1.4–7)
NEUTROPHILS NFR BLD AUTO: 52 %
PLATELET # BLD AUTO: 335 X10E3/UL (ref 150–450)
POTASSIUM SERPL-SCNC: 4.1 MMOL/L (ref 3.5–5.2)
PROT SERPL-MCNC: 7.2 G/DL (ref 6–8.5)
RBC # BLD AUTO: 3.95 X10E6/UL (ref 3.77–5.28)
SODIUM SERPL-SCNC: 146 MMOL/L (ref 134–144)
WBC # BLD AUTO: 8.6 X10E3/UL (ref 3.4–10.8)

## 2021-11-15 DIAGNOSIS — I10 BENIGN ESSENTIAL HTN: ICD-10-CM

## 2021-11-15 RX ORDER — HYDROCHLOROTHIAZIDE 25 MG/1
25 TABLET ORAL DAILY
Qty: 30 TABLET | Refills: 2 | Status: SHIPPED | OUTPATIENT
Start: 2021-11-15 | End: 2021-12-03 | Stop reason: SDUPTHER

## 2021-11-15 NOTE — TELEPHONE ENCOUNTER
Pt came in to office because she needs a refill on on the hydrochlorothiazide. She only has 1 pill left for tomorrow.  Thank you

## 2021-11-18 ENCOUNTER — OFFICE VISIT (OUTPATIENT)
Dept: RHEUMATOLOGY | Age: 66
End: 2021-11-18
Payer: MEDICARE

## 2021-11-18 VITALS
DIASTOLIC BLOOD PRESSURE: 92 MMHG | RESPIRATION RATE: 18 BRPM | HEART RATE: 80 BPM | TEMPERATURE: 98 F | OXYGEN SATURATION: 98 % | SYSTOLIC BLOOD PRESSURE: 136 MMHG

## 2021-11-18 VITALS
BODY MASS INDEX: 27.87 KG/M2 | HEART RATE: 80 BPM | WEIGHT: 147.6 LBS | TEMPERATURE: 98 F | OXYGEN SATURATION: 98 % | HEIGHT: 61 IN | SYSTOLIC BLOOD PRESSURE: 136 MMHG | RESPIRATION RATE: 20 BRPM | DIASTOLIC BLOOD PRESSURE: 92 MMHG

## 2021-11-18 DIAGNOSIS — M81.0 AGE-RELATED OSTEOPOROSIS WITHOUT CURRENT PATHOLOGICAL FRACTURE: ICD-10-CM

## 2021-11-18 DIAGNOSIS — Z79.899 ONGOING USE OF POSSIBLY TOXIC MEDICATION: ICD-10-CM

## 2021-11-18 DIAGNOSIS — M81.0 AGE-RELATED OSTEOPOROSIS WITHOUT CURRENT PATHOLOGICAL FRACTURE: Primary | ICD-10-CM

## 2021-11-18 DIAGNOSIS — E55.9 VITAMIN D DEFICIENCY: ICD-10-CM

## 2021-11-18 DIAGNOSIS — M05.9 SEROPOSITIVE RHEUMATOID ARTHRITIS (HCC): Primary | ICD-10-CM

## 2021-11-18 PROCEDURE — 1090F PRES/ABSN URINE INCON ASSESS: CPT | Performed by: INTERNAL MEDICINE

## 2021-11-18 PROCEDURE — G0463 HOSPITAL OUTPT CLINIC VISIT: HCPCS | Performed by: INTERNAL MEDICINE

## 2021-11-18 PROCEDURE — 3017F COLORECTAL CA SCREEN DOC REV: CPT | Performed by: INTERNAL MEDICINE

## 2021-11-18 PROCEDURE — G8419 CALC BMI OUT NRM PARAM NOF/U: HCPCS | Performed by: INTERNAL MEDICINE

## 2021-11-18 PROCEDURE — 1101F PT FALLS ASSESS-DOCD LE1/YR: CPT | Performed by: INTERNAL MEDICINE

## 2021-11-18 PROCEDURE — G9899 SCRN MAM PERF RSLTS DOC: HCPCS | Performed by: INTERNAL MEDICINE

## 2021-11-18 PROCEDURE — G0444 DEPRESSION SCREEN ANNUAL: HCPCS | Performed by: INTERNAL MEDICINE

## 2021-11-18 PROCEDURE — G8510 SCR DEP NEG, NO PLAN REQD: HCPCS | Performed by: INTERNAL MEDICINE

## 2021-11-18 PROCEDURE — G8536 NO DOC ELDER MAL SCRN: HCPCS | Performed by: INTERNAL MEDICINE

## 2021-11-18 PROCEDURE — 99215 OFFICE O/P EST HI 40 MIN: CPT | Performed by: INTERNAL MEDICINE

## 2021-11-18 PROCEDURE — 96401 CHEMO ANTI-NEOPL SQ/IM: CPT | Performed by: INTERNAL MEDICINE

## 2021-11-18 PROCEDURE — 96372 THER/PROPH/DIAG INJ SC/IM: CPT

## 2021-11-18 PROCEDURE — G8427 DOCREV CUR MEDS BY ELIG CLIN: HCPCS | Performed by: INTERNAL MEDICINE

## 2021-11-18 RX ORDER — ALBUTEROL SULFATE 0.83 MG/ML
2.5 SOLUTION RESPIRATORY (INHALATION) AS NEEDED
Status: CANCELLED
Start: 2022-05-02

## 2021-11-18 RX ORDER — DIPHENHYDRAMINE HYDROCHLORIDE 50 MG/ML
25 INJECTION, SOLUTION INTRAMUSCULAR; INTRAVENOUS AS NEEDED
Status: CANCELLED
Start: 2022-05-02

## 2021-11-18 RX ORDER — HYDROCORTISONE SODIUM SUCCINATE 100 MG/2ML
100 INJECTION, POWDER, FOR SOLUTION INTRAMUSCULAR; INTRAVENOUS AS NEEDED
Status: CANCELLED | OUTPATIENT
Start: 2022-05-02

## 2021-11-18 RX ORDER — EPINEPHRINE 1 MG/ML
0.3 INJECTION, SOLUTION, CONCENTRATE INTRAVENOUS AS NEEDED
Status: CANCELLED | OUTPATIENT
Start: 2022-05-02

## 2021-11-18 RX ORDER — DIPHENHYDRAMINE HYDROCHLORIDE 50 MG/ML
50 INJECTION, SOLUTION INTRAMUSCULAR; INTRAVENOUS AS NEEDED
Status: CANCELLED
Start: 2022-05-02

## 2021-11-18 RX ORDER — ONDANSETRON 2 MG/ML
8 INJECTION INTRAMUSCULAR; INTRAVENOUS AS NEEDED
Status: CANCELLED | OUTPATIENT
Start: 2022-05-02

## 2021-11-18 RX ORDER — ACETAMINOPHEN 325 MG/1
650 TABLET ORAL AS NEEDED
Status: CANCELLED
Start: 2022-05-02

## 2021-11-18 RX ADMIN — DENOSUMAB 60 MG: 60 INJECTION SUBCUTANEOUS at 10:15

## 2021-11-18 NOTE — PATIENT INSTRUCTIONS
1. Injection today for Prolia. 2. Continue your leflunomide daily, labs look good. 3. Repeat labs in 3 months, and then again in 6 months before your next Prolia injection. 4. Return in 6 months, you can coordinate with your Prolia. If you have any joint or breathing problems in the meantime just let us know. Keep following with Dr. Juliet Riley at least once a year for the lung monitoring.

## 2021-11-18 NOTE — LETTER
11/18/2021    Patient: Christiana Garcia   YOB: 1955   Date of Visit: 11/18/2021     Jaimie Smalls, [address]  [place] Tamika Aguilar MD  [address] [facility] [address]  [place] [place] [employer]    Dear Jaimie Smalls, Maria Ines Rosario MD,    We recently saw Ms. Christiana Garcia in the [facility] for evaluation. My notes for this consultation are attached. If you have questions, please do not hesitate to call me. I look forward to following your patient along with you.       Sincerely,    Jerad Grider MD [facility]  Cell: [phone]

## 2021-11-18 NOTE — PROGRESS NOTES
REASON FOR VISIT    Patient presents for a follow up visit for seropositive RA    HISTORY OF DISEASE: Seropositive Rheumatoid Arthritis; erosive    Year of diagnosis: 2013  First visit with this clinic:  3/2019  Cumulative disease manifestations:   Positive serologies/labs: RF, CCP  Negative serologies/labs:   Extra-articular comorbidities: Pulmonary fibrosis   Other co-morbidities:  Osteoporosis, low-grade hyperCa  Relapses: 12/2018 but on not treatment     Past treatment history:  Prednisone:   Non-biologic DMARD: Methotrexate 20 mg oral weekly (2013-1/2020; d/blake due to hair loss) Leflunomide 20 mg oral daily (1/2020-present)  Biologic:  Other: Prolia 11/2019-  Broke wrist in 2006, never felt it healed entirely, went for Xrays and told she had arthritis. Started methotrexate      HISTORY OF PRESENT ILLNESS     Remains on leflunomide 20mg daily. In September had transaminitis (AST 61, ALT 75) but repeat labs last week showed normalization without med changes. Denies morning stiffness. Joints continue to feel good. Right wrist always appears swollen but is the wrist she fractured, this stays swollen. No breathing concerns. Uses Breo only twice a month on average. Rare cough she doesn't find bothersome, though she takes tessalon pearles regularly. Due for her Prolia this morning, labs last week were normal. Has been tolerating injections. REVIEW OF SYSTEMS    A comprehensive review of systems was performed and pertinent results are documented in the HPI, review of systems is otherwise non-contributory. PAST MEDICAL HISTORY    Past Medical History:   Diagnosis Date    Age-related osteoporosis without current pathological fracture 6/17/2021    Osteoporosis     Rheumatoid arthritis ([address]         History reviewed. No pertinent surgical history.     FAMILY HISTORY    Family History   Problem Relation Age of Onset    No Known Problems Mother     No Known Problems Father        SOCIAL HISTORY    Social History     Tobacco Use    Smoking status: Former Smoker    Smokeless tobacco: Never Used    Tobacco comment: Quit 3 Years Ago   Vaping Use    Vaping Use: Never used   Substance Use Topics    Alcohol use: Yes     Comment: Social    Drug use: No       MEDICATIONS    Current Outpatient Medications   Medication Sig Dispense Refill    hydroCHLOROthiazide (HYDRODIURIL) 25 mg tablet Take 1 Tablet by mouth daily. 30 Tablet 2    atorvastatin (LIPITOR) 40 mg tablet TAKE 1 TABLET BY MOUTH DAILY 30 Tablet 2    Blood Pressure Test Kit-Medium kit Check blood pressure once daily and maintain a log of blood pressure readings 1 Kit 0    leflunomide (ARAVA) 20 mg tablet TAKE 1 TABLET BY MOUTH DAILY 90 Tablet 1    benzonatate (TESSALON) 200 mg capsule TAKE 1 CAPSULE BY MOUTH THREE TIMES DAILY AS NEEDED      fluticasone/vilanterol (BREO ELLIPTA IN) Take  by inhalation.  denosumab (PROLIA) 60 mg/mL injection 60 mg by SubCUTAneous route once. Indications: patient gets this injection every 6 months      calcium carbonate-vitamin D3 (CALTRATE 600 + D) 600 mg (1,500 mg)-800 unit chew Take 2 Tabs by mouth daily. 180 Tab 3     Facility-Administered Medications Ordered in Other Visits   Medication Dose Route Frequency Provider Last Rate Last Admin    denosumab (PROLIA) injection 60 mg  60 mg SubCUTAneous ONCE Hayden Jensen MD           ALLERGIES    No Known Allergies    PHYSICAL EXAMINATION  Visit Vitals  BP (!) 136/92 (BP 1 Location: Right arm, BP Patient Position: Sitting)   Pulse 80   Temp 98 °F (36.7 °C) (Oral)   Resp 20   Ht 5' 0.5\" (1.537 m)   Wt 147 lb 9.6 oz (67 kg)   SpO2 98%   BMI 28.35 kg/m²     General:  The patient is well developed, well nourished, alert, and in no apparent distress. Eyes: Sclera are anicteric. No conjunctival injection. HEENT:  Oropharynx is clear. No oral ulcers. Adequate salivary pooling. No cervical or supraclavicular lymphadenopathy.    Lungs: No jazzy basilar crackles today, no wheeze or stridor. Normal respiratory effort. Cor:  Regular rate and rhythm. No murmurs rubs or gallops. Abdomen: Soft, non-tender, without hepatomegaly or masses. Extremities: No calf tenderness or edema. Warm and well perfused. Skin:  No significant abnormalities. No petechial, purpuric, or psoriaform rashes   Neuro: Nonfocal  Musculoskeletal:    A comprehensive musculoskeletal exam was performed for all joints of each upper and lower extremity and assessed for swelling, tenderness and range of motion. Results are documented as below:  Limited flexion/extension right > left bilateral wrists 2/2 damage, still resolved left wrist and left 1st MCP synovitis. Still resolved ankle synovitis, hamertoes of feet without MTP squeeze tenderness. DATA REVIEW    Prior medical records were reviewed and if applicable are summarized as below:    Labs:   11/12/21: WBC 8.6, Hgb 12.6, Plt 335; ESR 12, Cr 0.81, Ca 10.8, LFTs WNL  9/14/21:   6/9/21: ESR 75, LFTs WNL, CRP 9mg/L  5/10/21: Cr 0.96, Ca 10.3, no LFTs or CBC.  11/2020: Vit D 45; TSH WNL, Ca 10.7->10.0, BMP WNL  7/2020:  CBC WNL, Ca 10.9, Cr 0.84, CMP WNL; Hep B SAg neg, SAb neg, IgM cAb+, total cAb neg, eAg neg; QFN neg  1/2020: Cbc, cmp unremarkable  10/2019: cbc with thrombocytosis, elevated PMNs, lymph, monocytes, CMP normal  7/2019: Cbc, cmp unremarkable  5/2019: BMP normal  3/2019: CCP, RF positive, ESR, CRP normal, Hep B, C, quant gold negative  2/2019: cbc, CMP unremarkable    Imaging:   CT chest (2/6/21):    1. The lungs demonstrate persistent abnormality bilaterally this is most  pronounced in the periphery of the upper lobes anteriorly does involve all  lobes. Findings are consistent with peripheral areas of fibrosis and  honeycombing. The pattern is similar. There is a fibrosis appears somewhat  better defined in some of the honeycomb cysts are slightly larger. This is only  minimal change.   2. Overall the patient effected a better degree of inspiration on today's  examination mild areas of bronchiectasis are noted in the lower lobes. This is better seen on this study than on the previous exam and is mild but may have progressed slightly. 3. No adenopathy CT chest (11/11/19): 1. There are peripheral reticular opacities with honeycombing. This is most pronounced anteriorly in each upper lobe left greater than right also involves the lower lobes. These findings are consistent with pulmonary fibrosis. Pattern  is not typical for UIP. 2. Adenopathy as described above. This could be reactive. Exact etiology is  uncertain. Follow-up exam in 3-6 months may yield more information. Hand X-rays (3/2019): Personally reviewed images. + erosions consistent with RA and severe OA  Foot x-rays (3/2019): Personally reviewed images. + erosions  DEXA (3/2019): T score -2.6 at the left radius    ASSESSMENT AND PLAN    A 78 y.o. female with hx of osteoporosis, seropositive erosive rheumatoid arthritis on leflunomide 20 mg oral daily presents for a follow up visit with inflammatory arthritis still in clinical remission. She remains very pleased with joint control and lung disease is grossly stable, brief transaminitis in the fall may have reflected a lab error. No changes to her regimen. # Seropositive erosive Rheumatoid arthritis:  - continue leflunomide 20 mg oral daily. 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 Aye Lockett with ~annual followup due around March 2022    # Osteoporosis:  - s/p Prolia in 4/2020, 11/2020, 5/2021, due today. # Hand osteoarthritis: largely asymptomatic. Erosive osteoarthritis.    - will continue to monitor, cont diclofenac gel as needed    #Hypertension  -Pt hasn't yet taken today's BP medications, will take immediately after this visit    # Medication Toxicity Monitoring:  - cbc, cmp q3mo  - Hepatitis B, C: negative 3/2019--false positive Hep B cAb  - Quant gold: negative 3/2019  - bone health: see above    RTC in 6 months    Patient Instructions   1. Injection today for Prolia. 2. Continue your leflunomide daily, labs look good. 3. Repeat labs in 3 months, and then again in 6 months before your next Prolia injection. 4. Return in 6 months, you can coordinate with your Prolia. If you have any joint or breathing problems in the meantime just let us know. Keep following with Dr. Salome Mora at least once a year for the lung monitoring. The patient voiced understanding of the aforementioned assessment and plan. Summary of plan was provided in the After Visit Summary patient instructions. I also provided education about MyChart setup and utility. Ms. Harpreet Guerrero has a reminder for a \"due or due soon\" health maintenance. I have asked that she contact her primary care provider for follow-up on this health maintenance.     TODAY'S ORDERS    Orders Placed This Encounter    C REACTIVE PROTEIN, QT    CBC WITH AUTOMATED DIFF    METABOLIC PANEL, COMPREHENSIVE    SED RATE (ESR)    C REACTIVE PROTEIN, QT    CBC WITH AUTOMATED DIFF    METABOLIC PANEL, COMPREHENSIVE    SED RATE (ESR)    VITAMIN D, 25 HYDROXY     Face to face time: 25 minutes  Note preparation and records review day of service: 25 minutes  Total provider time day of service: 50 minutes       Future Appointments   Date Time Provider Catherine Juhi   11/18/2021 10:30 AM [id number] BS AMB   12/3/2021  8:00 AM Jelena Stuart MD [facility] BS DEE DEE Barboza MD    Adult Rheumatology   [facility]  [facility] [address]   Phone [phone]  Fax [phone]

## 2021-11-18 NOTE — PROGRESS NOTES
Chief Complaint   Patient presents with    Arthritis     1. Have you been to the ER, urgent care clinic since your last visit? Hospitalized since your last visit? No    2. Have you seen or consulted any other health care providers outside of the [address] [place] [address] since your last visit? Include any pap smears or colon screening.  No

## 2021-12-03 ENCOUNTER — OFFICE VISIT (OUTPATIENT)
Dept: FAMILY MEDICINE CLINIC | Age: 66
End: 2021-12-03
Payer: MEDICARE

## 2021-12-03 VITALS
WEIGHT: 152.4 LBS | DIASTOLIC BLOOD PRESSURE: 87 MMHG | BODY MASS INDEX: 28.77 KG/M2 | RESPIRATION RATE: 16 BRPM | HEART RATE: 79 BPM | SYSTOLIC BLOOD PRESSURE: 137 MMHG | HEIGHT: 61 IN | OXYGEN SATURATION: 97 %

## 2021-12-03 DIAGNOSIS — Z13.29 SCREENING FOR THYROID DISORDER: ICD-10-CM

## 2021-12-03 DIAGNOSIS — Z12.31 ENCOUNTER FOR SCREENING MAMMOGRAM FOR MALIGNANT NEOPLASM OF BREAST: ICD-10-CM

## 2021-12-03 DIAGNOSIS — Z13.1 SCREENING FOR DIABETES MELLITUS: ICD-10-CM

## 2021-12-03 DIAGNOSIS — Z00.00 INITIAL MEDICARE ANNUAL WELLNESS VISIT: Primary | ICD-10-CM

## 2021-12-03 DIAGNOSIS — J84.10 PULMONARY FIBROSIS (HCC): ICD-10-CM

## 2021-12-03 DIAGNOSIS — E78.5 HYPERLIPIDEMIA, UNSPECIFIED HYPERLIPIDEMIA TYPE: ICD-10-CM

## 2021-12-03 DIAGNOSIS — Z13.6 SCREENING FOR ISCHEMIC HEART DISEASE: ICD-10-CM

## 2021-12-03 DIAGNOSIS — M05.79 RHEUMATOID ARTHRITIS INVOLVING MULTIPLE SITES WITH POSITIVE RHEUMATOID FACTOR (HCC): ICD-10-CM

## 2021-12-03 DIAGNOSIS — R73.01 IMPAIRED FASTING GLUCOSE: ICD-10-CM

## 2021-12-03 DIAGNOSIS — I10 BENIGN ESSENTIAL HTN: ICD-10-CM

## 2021-12-03 LAB
CHOLEST SERPL-MCNC: 145 MG/DL
EST. AVERAGE GLUCOSE BLD GHB EST-MCNC: 105 MG/DL
HBA1C MFR BLD: 5.3 % (ref 4–5.6)
HDLC SERPL-MCNC: 45 MG/DL
HDLC SERPL: 3.2 {RATIO} (ref 0–5)
LDLC SERPL CALC-MCNC: 75.8 MG/DL (ref 0–100)
TRIGL SERPL-MCNC: 121 MG/DL (ref ?–150)
TSH SERPL DL<=0.05 MIU/L-ACNC: 2.21 UIU/ML (ref 0.36–3.74)
VLDLC SERPL CALC-MCNC: 24.2 MG/DL

## 2021-12-03 PROCEDURE — G8432 DEP SCR NOT DOC, RNG: HCPCS | Performed by: STUDENT IN AN ORGANIZED HEALTH CARE EDUCATION/TRAINING PROGRAM

## 2021-12-03 PROCEDURE — 3017F COLORECTAL CA SCREEN DOC REV: CPT | Performed by: STUDENT IN AN ORGANIZED HEALTH CARE EDUCATION/TRAINING PROGRAM

## 2021-12-03 PROCEDURE — G8536 NO DOC ELDER MAL SCRN: HCPCS | Performed by: STUDENT IN AN ORGANIZED HEALTH CARE EDUCATION/TRAINING PROGRAM

## 2021-12-03 PROCEDURE — G8419 CALC BMI OUT NRM PARAM NOF/U: HCPCS | Performed by: STUDENT IN AN ORGANIZED HEALTH CARE EDUCATION/TRAINING PROGRAM

## 2021-12-03 PROCEDURE — 1101F PT FALLS ASSESS-DOCD LE1/YR: CPT | Performed by: STUDENT IN AN ORGANIZED HEALTH CARE EDUCATION/TRAINING PROGRAM

## 2021-12-03 PROCEDURE — G8427 DOCREV CUR MEDS BY ELIG CLIN: HCPCS | Performed by: STUDENT IN AN ORGANIZED HEALTH CARE EDUCATION/TRAINING PROGRAM

## 2021-12-03 PROCEDURE — G9899 SCRN MAM PERF RSLTS DOC: HCPCS | Performed by: STUDENT IN AN ORGANIZED HEALTH CARE EDUCATION/TRAINING PROGRAM

## 2021-12-03 PROCEDURE — G0438 PPPS, INITIAL VISIT: HCPCS | Performed by: STUDENT IN AN ORGANIZED HEALTH CARE EDUCATION/TRAINING PROGRAM

## 2021-12-03 RX ORDER — HYDROCHLOROTHIAZIDE 25 MG/1
25 TABLET ORAL DAILY
Qty: 30 TABLET | Refills: 5 | Status: SHIPPED | OUTPATIENT
Start: 2021-12-03 | End: 2021-12-09 | Stop reason: SDUPTHER

## 2021-12-03 NOTE — PROGRESS NOTES
Temitope Cote is a 78 y.o. female    Chief Complaint   Patient presents with   Leo Self Annual Wellness Visit       1. Have you been to the ER, urgent care clinic since your last visit? Hospitalized since your last visit? No  2. Have you seen or consulted any other health care providers outside of the [address] [place] [address] since your last visit? Include any pap smears or colon screening. No    Visit Vitals  /87 (BP 1 Location: Right arm, BP Patient Position: Sitting)   Pulse 79   Resp 16   Ht 5' 0.5\" (1.537 m)   Wt 152 lb 6.4 oz (69.1 kg)   SpO2 97%   BMI 29.27 kg/m²       Health Maintenance Due   Topic Date Due    Colorectal Cancer Screening Combo  Never done    Breast Cancer Screen Mammogram  Never done    Medicare Yearly Exam  Never done    COVID-19 Vaccine (2 - Moderna 3-dose booster series) 05/16/2021    Flu Vaccine (1) 09/01/2021       General Health Questions   -During the past 4 weeks:   -how would you rate your health in general? Good   -how often have you been bothered by feeling dizzy when standing up? -how much have you been bothered by bodily pain? mildly   -Have you noticed any hearing difficulties? no   -has your physical and emotional health limited your social activities with family or friends? no    Emotional Health Questions   -Do you have a history of depression, anxiety, or emotional problems? no  -Over the past 2 weeks, have you felt down, depressed or hopeless? no  -Over the past 2 weeks, have you felt little interest or pleasure in doing things? no    Health Habits   Please describe your diet habits: eat 2x daily healthy foods  Do you get 5 servings of fruits or vegetables daily? no  Do you exercise regularly?  yes    Activities of Daily Living and Functional Status   -Do you need help with eating, walking, dressing, bathing, toileting, the phone, transportation, shopping, preparing meals, housework, laundry, medications or managing money? no  -In the past four weeks, was someone available to help you if you needed and wanted help with anything? yes  -Are you confident are you that you can control and manage most of your health problems? yes  -Have you been given information to help you keep track of your medications? yes  -How often do you have trouble taking your medications as prescribed? never    Fall Risk and Home Safety   Have you fallen 2 or more times in the past year? no  Does your home have rugs in the hallways? no, Do you have grab bars in the bathrooms?  no, Does your home have handrails on the stairs? yes, Do you have adequate lighting in your home? yes  Do you have smoke detectors and check them regularly?  yes  Do you have difficulties driving a car/vehicle? no  Do you always fasten your seat belt when you are in a car? yes

## 2021-12-03 NOTE — PATIENT INSTRUCTIONS
Medicare Wellness Visit, Female     The best way to live healthy is to have a lifestyle where you eat a well-balanced diet, exercise regularly, limit alcohol use, and quit all forms of tobacco/nicotine, if applicable. Regular preventive services are another way to keep healthy. Preventive services (vaccines, screening tests, monitoring & exams) can help personalize your care plan, which helps you manage your own care. Screening tests can find health problems at the earliest stages, when they are easiest to treat. [facility] follows the current, evidence-based guidelines published by the [facility]) when recommending preventive services for our patients. Because we follow these guidelines, sometimes recommendations change over time as research supports it. (For example, mammograms used to be recommended annually. Even though Medicare will still pay for an annual mammogram, the newer guidelines recommend a mammogram every two years for women of average risk). Of course, you and your doctor may decide to screen more often for some diseases, based on your risk and your co-morbidities (chronic disease you are already diagnosed with). Preventive services for you include:  - Medicare offers their members a free annual wellness visit, which is time for you and your primary care provider to discuss and plan for your preventive service needs. Take advantage of this benefit every year!  -All adults over the age of 72 should receive the recommended pneumonia vaccines. Current USPSTF guidelines recommend a series of two vaccines for the best pneumonia protection.   -All adults should have a flu vaccine yearly and a tetanus vaccine every 10 years.   -All adults age 48 and older should receive the shingles vaccines (series of two vaccines).       -All adults age 38-68 who are overweight should have a diabetes screening test once every three years.   -All adults born between [birth year] and 1966 should be screened once for Hepatitis C.  -Other screening tests and preventive services for persons with diabetes include: an eye exam to screen for diabetic retinopathy, a kidney function test, a foot exam, and stricter control over your cholesterol.   -Cardiovascular screening for adults with routine risk involves an electrocardiogram (ECG) at intervals determined by your doctor.   -Colorectal cancer screenings should be done for adults age 54-65 with no increased risk factors for colorectal cancer. There are a number of acceptable methods of screening for this type of cancer. Each test has its own benefits and drawbacks. Discuss with your doctor what is most appropriate for you during your annual wellness visit. The different tests include: colonoscopy (considered the best screening method), a fecal occult blood test, a fecal DNA test, and sigmoidoscopy.    -A bone mass density test is recommended when a woman turns 65 to screen for osteoporosis. This test is only recommended one time, as a screening. Some providers will use this same test as a disease monitoring tool if you already have osteoporosis. -Breast cancer screenings are recommended every other year for women of normal risk, age 54-69.  -Cervical cancer screenings for women over age 72 are only recommended with certain risk factors.      Here is a list of your current Health Maintenance items (your personalized list of preventive services) with a due date:  Health Maintenance Due   Topic Date Due    Colorectal Screening  Never done    Mammogram  Never done    Annual Well Visit  Never done    COVID-19 Vaccine (2 - Danelle Hammed 3-dose booster series) 05/16/2021    Yearly Flu Vaccine (1) 09/01/2021         Beneficiaries Diagnosed with Pre-Diabetes  Medicare provides coverage for a maximum of 2 diabetes screening tests within a 12-month period (but not less than 6 months apart) for beneficiaries diagnosed with pre-diabetes. Beneficiaries Previously Tested but not Diagnosed as Pre-Diabetic or Who Have Never Been Tested  Medicare provides coverage for 1 diabetes screening test within a 12-month period (i.e., at least 11 months have passed following the month in which the last Medicare-covered diabetes screening test was performed) for beneficiaries who were previously tested and were not diagnosed with pre-diabetes, or who have never been tested. Risk Factors  To be eligible for the diabetes screening tests, beneficiaries must have any of the following risk factors:   Hypertension,   Dyslipidemia,   Obesity (a body mass index greater than or equal to 30 kg/m), or   Previous identification of an elevated impaired fasting glucose or glucose tolerance. OR  At least two of the following characteristics:   Overweight (a body mass index greater than 25 but less than 30 kg/m),   Family history of diabetes,   Aged 72 years and older, or   A history of gestational diabetes mellitus or delivery of a baby weighing greater than 9 pounds. High Cholesterol: Care Instructions  Overview     Cholesterol is a type of fat in your blood. It is needed for many body functions, such as making new cells. Cholesterol is made by your body. It also comes from food you eat. High cholesterol means that you have too much of the fat in your blood. This raises your risk of a heart attack and stroke. LDL and HDL are part of your total cholesterol. LDL is the \"bad\" cholesterol. High LDL can raise your risk for coronary artery disease, heart attack, and stroke. HDL is the \"good\" cholesterol. It helps clear bad cholesterol from the body. High HDL is linked with a lower risk of coronary artery disease, heart attack, and stroke. Your cholesterol levels help your doctor find out your risk for having a heart attack or stroke. You and your doctor can talk about whether you need to lower your risk and what treatment is best for you.   Treatment options include a heart-healthy lifestyle and medicine. Both options can help lower your cholesterol and your risk. The way you choose to lower your risk will depend on how high your risk is for heart attack and stroke. It will also depend on how you feel about taking medicines. Follow-up care is a key part of your treatment and safety. Be sure to make and go to all appointments, and call your doctor if you are having problems. It's also a good idea to know your test results and keep a list of the medicines you take. How can you care for yourself at home? · Eat heart-healthy foods. ? Eat fruits, vegetables, whole grains, beans, and other high-fiber foods. ? Eat lean proteins, such as seafood, lean meats, beans, nuts, and soy products. ? Eat healthy fats, such as canola and olive oil. ? Choose foods that are low in saturated fat. ? Limit sodium and alcohol. ? Limit drinks and foods with added sugar. · Be physically active. Try to do moderate activity at least 2½ hours a week. Or try to do vigorous activity at least 1¼ hours a week. You may want to walk or try other activities, such as running, swimming, cycling, or playing tennis or team sports. · Stay at a healthy weight or lose weight by making the changes in eating and physical activity listed above. Losing just a small amount of weight, even 5 to 10 pounds, can help reduce your risk for having a heart attack or stroke. · Do not smoke. · Manage other health problems. These include diabetes and high blood pressure. If you think you may have a problem with alcohol or drug use, talk to your doctor. · If you take medicine, take it exactly as prescribed. Call your doctor if you think you are having a problem with your medicine. · Check with your doctor or pharmacist before you use any other medicines, including over-the-counter medicines. Make sure your doctor knows all of the medicines, vitamins, herbal products, and supplements you take.  Taking some medicines together can cause problems. When should you call for help? Watch closely for changes in your health, and be sure to contact your doctor if:    · You need help making lifestyle changes.     · You have questions about your medicine. Where can you learn more? Go to http://www.gray.com/  Enter E785 in the search box to learn more about \"High Cholesterol: Care Instructions. \"  Current as of: April 29, 2021               Content Version: 13.0  © 2006-2021 [employer]. Care instructions adapted under license by [employer] (which disclaims liability or warranty for this information). If you have questions about a medical condition or this instruction, always ask your healthcare professional. [address] any warranty or liability for your use of this information. Learning About the [address] Diet  What is the Mediterranean diet? The Mediterranean diet is a style of eating rather than a diet plan. It features foods eaten in [place], [place], [place] and [place], and other countries along the [facility]. It emphasizes eating foods like fish, fruits, vegetables, beans, high-fiber breads and whole grains, nuts, and olive oil. This style of eating includes limited red meat, cheese, and sweets. Why choose the Mediterranean diet? A Mediterranean-style diet may improve heart health. It contains more fat than other heart-healthy diets. But the fats are mainly from nuts, unsaturated oils (such as fish oils and olive oil), and certain nut or seed oils (such as canola, soybean, or flaxseed oil). These fats may help protect the heart and blood vessels. How can you get started on the Mediterranean diet? Here are some things you can do to switch to a more Mediterranean way of eating.   What to eat  · Eat a variety of fruits and vegetables each day, such as grapes, blueberries, tomatoes, broccoli, peppers, figs, olives, spinach, eggplant, beans, lentils, and chickpeas. · Eat a variety of whole-grain foods each day, such as oats, brown rice, and whole wheat bread, pasta, and couscous. · Eat fish at least 2 times a week. Try tuna, salmon, mackerel, lake trout, herring, or sardines. · Eat moderate amounts of low-fat dairy products, such as milk, cheese, or yogurt. · Eat moderate amounts of poultry and eggs. · Choose healthy (unsaturated) fats, such as nuts, olive oil, and certain nut or seed oils like canola, soybean, and flaxseed. · Limit unhealthy (saturated) fats, such as butter, palm oil, and coconut oil. And limit fats found in animal products, such as meat and dairy products made with whole milk. Try to eat red meat only a few times a month in very small amounts. · Limit sweets and desserts to only a few times a week. This includes sugar-sweetened drinks like soda. The Mediterranean diet may also include red wine with your meal1 glass each day for women and up to 2 glasses a day for men. Tips for eating at home  · Use herbs, spices, garlic, lemon zest, and citrus juice instead of salt to add flavor to foods. · Add avocado slices to your sandwich instead of jenkins. · Have fish for lunch or dinner instead of red meat. Brush the fish with olive oil, and broil or grill it. · Sprinkle your salad with seeds or nuts instead of cheese. · Cook with olive or canola oil instead of butter or oils that are high in saturated fat. · Switch from 2% milk or whole milk to 1% or fat-free milk. · Dip raw vegetables in a vinaigrette dressing or hummus instead of dips made from mayonnaise or sour cream.  · Have a piece of fruit for dessert instead of a piece of cake. Try baked apples, or have some dried fruit. Tips for eating out  · Try broiled, grilled, baked, or poached fish instead of having it fried or breaded. · Ask your  to have your meals prepared with olive oil instead of butter.   · Order dishes made with marinara sauce or sauces made from olive oil. Avoid sauces made from cream or mayonnaise. · Choose whole-grain breads, whole wheat pasta and pizza crust, brown rice, beans, and lentils. · Cut back on butter or margarine on bread. Instead, you can dip your bread in a small amount of olive oil. · Ask for a side salad or grilled vegetables instead of french fries or chips. Where can you learn more? Go to http://www.fernandes.com/  Enter O407 in the search box to learn more about \"Learning About the Mediterranean Diet. \"  Current as of: December 17, 2020               Content Version: 13.0  © 2006-2021 Healthwise, [employer]. Care instructions adapted under license by [employer] (which disclaims liability or warranty for this information). If you have questions about a medical condition or this instruction, always ask your healthcare professional. [address] any warranty or liability for your use of this information.

## 2021-12-03 NOTE — PROGRESS NOTES
This is an Initial Medicare Annual Wellness Exam (AWV) (Performed 12 months after IPPE or effective date of Medicare Part B enrollment, Once in a lifetime)    I have reviewed the patient's medical history in detail and updated the computerized patient record. Assessment/Plan   Education and counseling provided:  Are appropriate based on today's review and evaluation  Pneumococcal Vaccine  Influenza Vaccine  Screening Mammography  Screening Pap and pelvic (covered once every 2 years)  Colorectal cancer screening tests  Cardiovascular screening blood test  Diabetes screening test    1. Initial Medicare annual wellness visit  2. Hyperlipidemia, unspecified hyperlipidemia type  3. Screening for ischemic heart disease  4. Need for hepatitis C screening test  5. Encounter for screening mammogram for malignant neoplasm of breast  6. Pulmonary fibrosis ([facility] [address]  7. Rheumatoid aortitis  8. Screening for diabetes mellitus       Health maintenance:      Immunizations:  Last flu shot: 10/2020  Last Tdap: Approx 2016, per pt  Shingrix: 10/2020  PNA vaccines: PCV 13 done 09/2019, PCV 23 done 10/2020  COVID: Alesia Vicente, second dose 04/18/2021, per pt     Health Maintenance:   Pap test: Approx 2016, wnl; would not like another one  Mammo: Order given 08/06/21  Colorectal cancer screening: Referral given 08/06/21  DEXA: Done in 2019; on Prolia  HCV: Done in 07/2020 - negative for Ab     ASCVD Risk: 12.5%    Subjective:   Patient states she is doing well overall. HTN: Has not been taking BP at home since her BP cuff broke was. Compliant with HCTZ daiy. HLD: Taking Atorvastatin daily. RA: Feels some pain in right wrist, but otherwise doing well. Able to use Voltaren gel with relief. States he feels like \"I don't have arthritis at all\". Has been walking for 2 hours daily with . ILD: Sees Dr. Crystal Connolly annually - no changes made during recent visit.     Diet: Eats a meat, starch, and vegetable for every meal. Enjoys one Pepsi approx once a month. Drinks a glass of wine very occasionally. Has tea with a little bit of sugar. Objective:     Visit Vitals  /87 (BP 1 Location: Right arm, BP Patient Position: Sitting)   Pulse 79   Resp 16   Ht 5' 0.5\" (1.537 m)   Wt 152 lb 6.4 oz (69.1 kg)   SpO2 97%   BMI 29.27 kg/m²     General: No acute distress. Well appearing female. HEENT: Normocephalic, atraumatic. Respiratory: Faint bibasilar crackles. No wheezes or rhonchi. Cardiac: RRR. Normal S1, S2.   Abdomen: Normoactive bowel sounds. Nontender to palpation  Extremities: No edema bilateral lower extremities. Neuro: Alert and oriented to conversation. Depression Risk Factor Screening     3 most recent PHQ Screens 11/18/2021   Little interest or pleasure in doing things Not at all   Feeling down, depressed, irritable, or hopeless Not at all   Total Score PHQ 2 0       Alcohol Risk Screen    Do you average more than 1 drink per night or more than 7 drinks a week:  No    On any one occasion in the past three months have you have had more than 3 drinks containing alcohol:  No         Functional Ability and Level of Safety    Hearing: Hearing is good. Activities of Daily Living: The home contains: no safety equipment  Patient does total self care     Ambulation: with no difficulty      Fall Risk:  Fall Risk Assessment, last 12 mths 11/18/2021   Able to walk? Yes   Fall in past 12 months? 0   Do you feel unsteady?  0   Are you worried about falling 0      Abuse Screen:  Patient is not abused       Cognitive Screening    Has your family/caregiver stated any concerns about your memory: no     Health Maintenance Due     Health Maintenance Due   Topic Date Due    Colorectal Cancer Screening Combo  Never done    Breast Cancer Screen Mammogram  Never done    Medicare Yearly Exam  Never done    COVID-19 Vaccine (2 - Moderna 3-dose booster series) 05/16/2021    Flu Vaccine (1) 09/01/2021       Patient Care Team   Patient Care Team:  Ruthy Anthony DO as PCP - General (Family Medicine)  Jones Nascimento MD (Pulmonary Disease)    History     Patient Active Problem List   Diagnosis Code    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 Current Outpatient Medications   Medication Sig Dispense Refill    hydroCHLOROthiazide (HYDRODIURIL) 25 mg tablet Take 1 Tablet by mouth daily. 30 Tablet 2    atorvastatin (LIPITOR) 40 mg tablet TAKE 1 TABLET BY MOUTH DAILY 30 Tablet 2    leflunomide (ARAVA) 20 mg tablet TAKE 1 TABLET BY MOUTH DAILY 90 Tablet 1    fluticasone/vilanterol (BREO ELLIPTA IN) Take  by inhalation.  denosumab (PROLIA) 60 mg/mL injection 60 mg by SubCUTAneous route once.  Indications: patient gets this injection every 6 months       No Known Allergies    Family History   Problem Relation Age of Onset    No Known Problems Mother     No Known Problems Father      Social History     Tobacco Use    Smoking status: Former Smoker    Smokeless tobacco: Never Used    Tobacco comment: Quit 3 Years Ago   Substance Use Topics    Alcohol use: Yes     Comment: Social Parvin Noriega MD

## 2021-12-09 DIAGNOSIS — I10 BENIGN ESSENTIAL HTN: ICD-10-CM

## 2021-12-09 RX ORDER — ATORVASTATIN CALCIUM 40 MG/1
40 TABLET, FILM COATED ORAL DAILY
Qty: 30 TABLET | Refills: 2 | Status: SHIPPED | OUTPATIENT
Start: 2021-12-09 | End: 2022-01-31 | Stop reason: SDUPTHER

## 2021-12-09 RX ORDER — HYDROCHLOROTHIAZIDE 25 MG/1
25 TABLET ORAL DAILY
Qty: 30 TABLET | Refills: 5 | Status: SHIPPED | OUTPATIENT
Start: 2021-12-09 | End: 2022-07-29

## 2021-12-13 DIAGNOSIS — M05.9 SEROPOSITIVE RHEUMATOID ARTHRITIS (HCC): ICD-10-CM

## 2021-12-13 RX ORDER — LEFLUNOMIDE 20 MG/1
20 TABLET ORAL DAILY
Qty: 90 TABLET | Refills: 1 | Status: SHIPPED | OUTPATIENT
Start: 2021-12-13 | End: 2022-01-10

## 2022-01-09 DIAGNOSIS — M05.9 SEROPOSITIVE RHEUMATOID ARTHRITIS (HCC): ICD-10-CM

## 2022-01-10 RX ORDER — LEFLUNOMIDE 20 MG/1
20 TABLET ORAL DAILY
Qty: 90 TABLET | Refills: 1 | Status: SHIPPED | OUTPATIENT
Start: 2022-01-10 | End: 2022-02-17 | Stop reason: SDUPTHER

## 2022-01-31 RX ORDER — ATORVASTATIN CALCIUM 40 MG/1
40 TABLET, FILM COATED ORAL DAILY
Qty: 30 TABLET | Refills: 2 | Status: SHIPPED | OUTPATIENT
Start: 2022-01-31 | End: 2022-02-04 | Stop reason: SDUPTHER

## 2022-02-04 RX ORDER — ATORVASTATIN CALCIUM 40 MG/1
40 TABLET, FILM COATED ORAL DAILY
Qty: 14 TABLET | Refills: 0 | Status: SHIPPED | OUTPATIENT
Start: 2022-02-04 | End: 2022-04-14

## 2022-02-04 NOTE — TELEPHONE ENCOUNTER
Pt called [facility] mail service and they said it will be another 10 days before she receives the medicine.  She wants to know if Dr. Leonard Pham can send a temporary refill to [facility] until the 90 day supply comes from  [facility] mail service

## 2022-02-08 ENCOUNTER — TRANSCRIBE ORDER (OUTPATIENT)
Dept: SCHEDULING | Age: 67
End: 2022-02-08

## 2022-02-08 DIAGNOSIS — J84.10 PULMONARY FIBROSIS (HCC): Primary | ICD-10-CM

## 2022-02-17 ENCOUNTER — HOSPITAL ENCOUNTER (OUTPATIENT)
Dept: CT IMAGING | Age: 67
Discharge: HOME | End: 2022-02-17
Attending: INTERNAL MEDICINE
Payer: MEDICARE

## 2022-02-17 DIAGNOSIS — M05.9 SEROPOSITIVE RHEUMATOID ARTHRITIS (HCC): ICD-10-CM

## 2022-02-17 DIAGNOSIS — J84.10 PULMONARY FIBROSIS (HCC): ICD-10-CM

## 2022-02-17 PROCEDURE — 71250 CT THORAX DX C-: CPT

## 2022-02-17 RX ORDER — LEFLUNOMIDE 20 MG/1
20 TABLET ORAL DAILY
Qty: 90 TABLET | Refills: 0 | Status: SHIPPED | OUTPATIENT
Start: 2022-02-17 | End: 2022-02-22 | Stop reason: SDUPTHER

## 2022-02-19 LAB
25(OH)D3+25(OH)D2 SERPL-MCNC: 53.3 NG/ML (ref 30–100)
ALBUMIN SERPL-MCNC: 4.6 G/DL (ref 3.8–4.8)
ALBUMIN/GLOB SERPL: 2 {RATIO} (ref 1.2–2.2)
ALP SERPL-CCNC: 57 IU/L (ref 44–121)
ALT SERPL-CCNC: 28 IU/L (ref 0–32)
AST SERPL-CCNC: 31 IU/L (ref 0–40)
BASOPHILS # BLD AUTO: 0.1 X10E3/UL (ref 0–0.2)
BASOPHILS NFR BLD AUTO: 1 %
BILIRUB SERPL-MCNC: 0.5 MG/DL (ref 0–1.2)
BUN SERPL-MCNC: 13 MG/DL (ref 8–27)
BUN/CREAT SERPL: 15 (ref 12–28)
CALCIUM SERPL-MCNC: 11.1 MG/DL (ref 8.7–10.3)
CHLORIDE SERPL-SCNC: 98 MMOL/L (ref 96–106)
CO2 SERPL-SCNC: 25 MMOL/L (ref 20–29)
CREAT SERPL-MCNC: 0.85 MG/DL (ref 0.57–1)
CRP SERPL-MCNC: 4 MG/L (ref 0–10)
EOSINOPHIL # BLD AUTO: 0.5 X10E3/UL (ref 0–0.4)
EOSINOPHIL NFR BLD AUTO: 5 %
ERYTHROCYTE [DISTWIDTH] IN BLOOD BY AUTOMATED COUNT: 11 % (ref 11.7–15.4)
ERYTHROCYTE [SEDIMENTATION RATE] IN BLOOD BY WESTERGREN METHOD: 52 MM/HR (ref 0–40)
GLOBULIN SER CALC-MCNC: 2.3 G/DL (ref 1.5–4.5)
GLUCOSE SERPL-MCNC: 100 MG/DL (ref 65–99)
HCT VFR BLD AUTO: 41.7 % (ref 34–46.6)
HGB BLD-MCNC: 13.3 G/DL (ref 11.1–15.9)
IMM GRANULOCYTES # BLD AUTO: 0 X10E3/UL (ref 0–0.1)
IMM GRANULOCYTES NFR BLD AUTO: 0 %
LYMPHOCYTES # BLD AUTO: 2.9 X10E3/UL (ref 0.7–3.1)
LYMPHOCYTES NFR BLD AUTO: 28 %
MCH RBC QN AUTO: 31 PG (ref 26.6–33)
MCHC RBC AUTO-ENTMCNC: 31.9 G/DL (ref 31.5–35.7)
MCV RBC AUTO: 97 FL (ref 79–97)
MONOCYTES # BLD AUTO: 0.7 X10E3/UL (ref 0.1–0.9)
MONOCYTES NFR BLD AUTO: 7 %
NEUTROPHILS # BLD AUTO: 5.9 X10E3/UL (ref 1.4–7)
NEUTROPHILS NFR BLD AUTO: 59 %
PLATELET # BLD AUTO: 403 X10E3/UL (ref 150–450)
POTASSIUM SERPL-SCNC: 3.6 MMOL/L (ref 3.5–5.2)
PROT SERPL-MCNC: 6.9 G/DL (ref 6–8.5)
RBC # BLD AUTO: 4.29 X10E6/UL (ref 3.77–5.28)
SODIUM SERPL-SCNC: 139 MMOL/L (ref 134–144)
WBC # BLD AUTO: 10.1 X10E3/UL (ref 3.4–10.8)

## 2022-02-22 DIAGNOSIS — M05.9 SEROPOSITIVE RHEUMATOID ARTHRITIS (HCC): ICD-10-CM

## 2022-02-22 RX ORDER — LEFLUNOMIDE 20 MG/1
20 TABLET ORAL DAILY
Qty: 90 TABLET | Refills: 0 | Status: SHIPPED | OUTPATIENT
Start: 2022-02-22 | End: 2022-07-06 | Stop reason: SDUPTHER

## 2022-03-18 PROBLEM — I10 BENIGN ESSENTIAL HTN: Status: ACTIVE | Noted: 2021-12-03

## 2022-03-18 PROBLEM — J84.10 PULMONARY FIBROSIS (HCC): Status: ACTIVE | Noted: 2021-12-03

## 2022-03-19 PROBLEM — M81.0 AGE-RELATED OSTEOPOROSIS WITHOUT CURRENT PATHOLOGICAL FRACTURE: Status: ACTIVE | Noted: 2021-06-17

## 2022-03-19 PROBLEM — M05.79 RHEUMATOID ARTHRITIS INVOLVING MULTIPLE SITES WITH POSITIVE RHEUMATOID FACTOR (HCC): Status: ACTIVE | Noted: 2021-12-03

## 2022-03-20 PROBLEM — I01.1 RHEUMATOID AORTITIS: Status: ACTIVE | Noted: 2019-02-07

## 2022-03-20 PROBLEM — E78.5 HYPERLIPIDEMIA: Status: ACTIVE | Noted: 2021-12-03

## 2022-04-14 RX ORDER — ATORVASTATIN CALCIUM 40 MG/1
TABLET, FILM COATED ORAL
Qty: 30 TABLET | Refills: 2 | Status: SHIPPED | OUTPATIENT
Start: 2022-04-14 | End: 2022-05-16 | Stop reason: SDUPTHER

## 2022-05-12 ENCOUNTER — TELEPHONE (OUTPATIENT)
Dept: RHEUMATOLOGY | Age: 67
End: 2022-05-12

## 2022-05-12 DIAGNOSIS — M81.0 AGE-RELATED OSTEOPOROSIS WITHOUT CURRENT PATHOLOGICAL FRACTURE: Primary | ICD-10-CM

## 2022-05-17 LAB
ALBUMIN SERPL-MCNC: 4.3 G/DL (ref 3.8–4.8)
BUN SERPL-MCNC: 13 MG/DL (ref 8–27)
BUN/CREAT SERPL: 16 (ref 12–28)
CALCIUM SERPL-MCNC: 11.2 MG/DL (ref 8.7–10.3)
CHLORIDE SERPL-SCNC: 99 MMOL/L (ref 96–106)
CO2 SERPL-SCNC: 24 MMOL/L (ref 20–29)
CREAT SERPL-MCNC: 0.8 MG/DL (ref 0.57–1)
EGFR: 81 ML/MIN/1.73
GLUCOSE SERPL-MCNC: 124 MG/DL (ref 65–99)
PHOSPHATE SERPL-MCNC: 3.4 MG/DL (ref 3–4.3)
POTASSIUM SERPL-SCNC: 3.6 MMOL/L (ref 3.5–5.2)
SODIUM SERPL-SCNC: 139 MMOL/L (ref 134–144)

## 2022-05-18 RX ORDER — ATORVASTATIN CALCIUM 40 MG/1
TABLET, FILM COATED ORAL
Qty: 30 TABLET | Refills: 2 | Status: SHIPPED | OUTPATIENT
Start: 2022-05-18 | End: 2022-08-03

## 2022-05-19 ENCOUNTER — OFFICE VISIT (OUTPATIENT)
Dept: RHEUMATOLOGY | Age: 67
End: 2022-05-19
Payer: MEDICARE

## 2022-05-19 VITALS
RESPIRATION RATE: 16 BRPM | DIASTOLIC BLOOD PRESSURE: 84 MMHG | TEMPERATURE: 98 F | HEART RATE: 81 BPM | SYSTOLIC BLOOD PRESSURE: 139 MMHG | OXYGEN SATURATION: 96 %

## 2022-05-19 VITALS
OXYGEN SATURATION: 96 % | HEIGHT: 61 IN | DIASTOLIC BLOOD PRESSURE: 84 MMHG | WEIGHT: 150.2 LBS | BODY MASS INDEX: 28.36 KG/M2 | TEMPERATURE: 98 F | SYSTOLIC BLOOD PRESSURE: 139 MMHG | RESPIRATION RATE: 16 BRPM | HEART RATE: 81 BPM

## 2022-05-19 DIAGNOSIS — Z79.899 ONGOING USE OF POSSIBLY TOXIC MEDICATION: ICD-10-CM

## 2022-05-19 DIAGNOSIS — M81.0 AGE-RELATED OSTEOPOROSIS WITHOUT CURRENT PATHOLOGICAL FRACTURE: Primary | ICD-10-CM

## 2022-05-19 DIAGNOSIS — E83.52 HYPERCALCEMIA: ICD-10-CM

## 2022-05-19 DIAGNOSIS — M81.0 AGE-RELATED OSTEOPOROSIS WITHOUT CURRENT PATHOLOGICAL FRACTURE: ICD-10-CM

## 2022-05-19 DIAGNOSIS — M05.9 SEROPOSITIVE RHEUMATOID ARTHRITIS (HCC): Primary | ICD-10-CM

## 2022-05-19 PROCEDURE — 99215 OFFICE O/P EST HI 40 MIN: CPT | Performed by: INTERNAL MEDICINE

## 2022-05-19 PROCEDURE — G8510 SCR DEP NEG, NO PLAN REQD: HCPCS | Performed by: INTERNAL MEDICINE

## 2022-05-19 PROCEDURE — G8754 DIAS BP LESS 90: HCPCS | Performed by: INTERNAL MEDICINE

## 2022-05-19 PROCEDURE — 1090F PRES/ABSN URINE INCON ASSESS: CPT | Performed by: INTERNAL MEDICINE

## 2022-05-19 PROCEDURE — 1101F PT FALLS ASSESS-DOCD LE1/YR: CPT | Performed by: INTERNAL MEDICINE

## 2022-05-19 PROCEDURE — 96372 THER/PROPH/DIAG INJ SC/IM: CPT

## 2022-05-19 PROCEDURE — G9899 SCRN MAM PERF RSLTS DOC: HCPCS | Performed by: INTERNAL MEDICINE

## 2022-05-19 PROCEDURE — G8536 NO DOC ELDER MAL SCRN: HCPCS | Performed by: INTERNAL MEDICINE

## 2022-05-19 PROCEDURE — G8752 SYS BP LESS 140: HCPCS | Performed by: INTERNAL MEDICINE

## 2022-05-19 PROCEDURE — G8427 DOCREV CUR MEDS BY ELIG CLIN: HCPCS | Performed by: INTERNAL MEDICINE

## 2022-05-19 PROCEDURE — 3017F COLORECTAL CA SCREEN DOC REV: CPT | Performed by: INTERNAL MEDICINE

## 2022-05-19 PROCEDURE — G8419 CALC BMI OUT NRM PARAM NOF/U: HCPCS | Performed by: INTERNAL MEDICINE

## 2022-05-19 PROCEDURE — G0463 HOSPITAL OUTPT CLINIC VISIT: HCPCS | Performed by: INTERNAL MEDICINE

## 2022-05-19 RX ORDER — EPINEPHRINE 1 MG/ML
0.3 INJECTION, SOLUTION, CONCENTRATE INTRAVENOUS AS NEEDED
OUTPATIENT
Start: 2022-10-30

## 2022-05-19 RX ORDER — DIPHENHYDRAMINE HYDROCHLORIDE 50 MG/ML
50 INJECTION, SOLUTION INTRAMUSCULAR; INTRAVENOUS AS NEEDED
Start: 2022-10-30

## 2022-05-19 RX ORDER — ONDANSETRON 2 MG/ML
8 INJECTION INTRAMUSCULAR; INTRAVENOUS AS NEEDED
OUTPATIENT
Start: 2022-10-30

## 2022-05-19 RX ORDER — ALBUTEROL SULFATE 0.83 MG/ML
2.5 SOLUTION RESPIRATORY (INHALATION) AS NEEDED
Start: 2022-10-30

## 2022-05-19 RX ORDER — HYDROCORTISONE SODIUM SUCCINATE 100 MG/2ML
100 INJECTION, POWDER, FOR SOLUTION INTRAMUSCULAR; INTRAVENOUS AS NEEDED
OUTPATIENT
Start: 2022-10-30

## 2022-05-19 RX ORDER — ACETAMINOPHEN 325 MG/1
650 TABLET ORAL AS NEEDED
Start: 2022-10-30

## 2022-05-19 RX ORDER — DIPHENHYDRAMINE HYDROCHLORIDE 50 MG/ML
25 INJECTION, SOLUTION INTRAMUSCULAR; INTRAVENOUS AS NEEDED
Start: 2022-10-30

## 2022-05-19 RX ADMIN — DENOSUMAB 60 MG: 60 INJECTION SUBCUTANEOUS at 09:06

## 2022-05-19 NOTE — PROGRESS NOTES
REASON FOR VISIT    Patient presents for a follow up visit for seropositive RA and osteoporosis. HISTORY OF DISEASE: Seropositive Rheumatoid Arthritis; erosive    Year of diagnosis: 2013  First visit with this clinic:  3/2019  Cumulative disease manifestations:   Positive serologies/labs: RF, CCP  Negative serologies/labs:   Extra-articular comorbidities: Pulmonary fibrosis   Other co-morbidities:  Osteoporosis, low-grade hyperCa  Relapses: 12/2018 but on not treatment     Past treatment history:  Prednisone:   Non-biologic DMARD: Methotrexate 20 mg oral weekly (2013-1/2020; d/blake due to hair loss) Leflunomide 20 mg oral daily (1/2020-present)  Biologic:  Other: Prolia 11/2019-  Broke wrist in 2006, never felt it healed entirely, went for Xrays and told she had arthritis. Started methotrexate but alopecia. Changed to leflunomide with      HISTORY OF PRESENT ILLNESS     Eats leafy greens every day. Takes Caltrate twice a day. Taking leflunomide daily, no problems with GI upset or loose stools    No joint pain or swelling. Denies extended morning stiffness. Received Prolia today without complication. Ca has been stably elevated, she is on hydrochlorthiazide and takes Caltrate twice a day. Not currently taking vitamin D supplements. Denies headache, constipation, abdominal pain, or depressed mood. REVIEW OF SYSTEMS    A comprehensive review of systems was performed and pertinent results are documented in the HPI, review of systems is otherwise non-contributory. PAST MEDICAL HISTORY    Past Medical History:   Diagnosis Date    Age-related osteoporosis without current pathological fracture 6/17/2021    Osteoporosis     Rheumatoid arthritis ([facility] [address]         History reviewed. No pertinent surgical history.     FAMILY HISTORY    Family History   Problem Relation Age of Onset    No Known Problems Mother     No Known Problems Father        SOCIAL HISTORY    Social History     Tobacco Use    Smoking status: Former Smoker    Smokeless tobacco: Never Used    Tobacco comment: Quit 3 Years Ago   Vaping Use    Vaping Use: Never used   Substance Use Topics    Alcohol use: Yes     Comment: Social    Drug use: No       MEDICATIONS    Current Outpatient Medications   Medication Sig Dispense Refill    atorvastatin (LIPITOR) 40 mg tablet TAKE 1 TABLET DAILY 30 Tablet 2    leflunomide (ARAVA) 20 mg tablet Take 1 Tablet by mouth daily. 90 Tablet 0    hydroCHLOROthiazide (HYDRODIURIL) 25 mg tablet Take 1 Tablet by mouth daily. 30 Tablet 5    fluticasone/vilanterol (BREO ELLIPTA IN) Take  by inhalation.  denosumab (PROLIA) 60 mg/mL injection 60 mg by SubCUTAneous route once. Indications: patient gets this injection every 6 months         ALLERGIES    No Known Allergies    PHYSICAL EXAMINATION  Visit Vitals  /84 (BP 1 Location: Left upper arm, BP Patient Position: Sitting)   Pulse 81   Temp 98 °F (36.7 °C) (Oral)   Resp 16   Ht 5' 0.5\" (1.537 m)   Wt 150 lb 3.2 oz (68.1 kg)   SpO2 96%   BMI 28.85 kg/m²     General:  The patient is well developed, well nourished, alert, and in no apparent distress. Eyes: Sclera are anicteric. No conjunctival injection. HEENT:  Oropharynx is clear. No oral ulcers. Adequate salivary pooling. No cervical or supraclavicular lymphadenopathy. Lungs: Still no basilar crackles today, no wheeze or stridor. Normal respiratory effort. Cor:  Regular rate and rhythm. No murmurs rubs or gallops. Abdomen: Soft, non-tender, without hepatomegaly or masses. Extremities: No calf tenderness or edema. Warm and well perfused. Skin:  No significant abnormalities. No petechial, purpuric, or psoriaform rashes   Neuro: Nonfocal  Musculoskeletal:    A comprehensive musculoskeletal exam was performed for all joints of each upper and lower extremity and assessed for swelling, tenderness and range of motion.  Results are documented as below:  Limited flexion/extension right > left bilateral wrists 2/2 damage, no left wrist or left 1st MCP synovitis. Still resolved ankle synovitis, hamertoes of feet without MTP squeeze tenderness. DATA REVIEW    Prior medical records were reviewed and if applicable are summarized as below:    Labs:   5/16/22: Cr 0.80, Ca 11.2  2/18/22: WBC 10.1, Hgb 13.3, Plt 403; ESR 52; Cr 0.85, Ca 11.1, LFTs WNL , CRP 4mg/L, Vit D 53  11/12/21: WBC 8.6, Hgb 12.6, Plt 335; ESR 12, Cr 0.81, Ca 10.8, LFTs WNL  9/14/21:   6/9/21: ESR 75, LFTs WNL, CRP 9mg/L  5/10/21: Cr 0.96, Ca 10.3, no LFTs or CBC.  11/2020: Vit D 45; TSH WNL, Ca 10.7->10.0, BMP WNL  7/2020:  CBC WNL, Ca 10.9, Cr 0.84, CMP WNL; Hep B SAg neg, SAb neg, IgM cAb+, total cAb neg, eAg neg; QFN neg  1/2020: Cbc, cmp unremarkable  10/2019: cbc with thrombocytosis, elevated PMNs, lymph, monocytes, CMP normal  7/2019: Cbc, cmp unremarkable  5/2019: BMP normal  3/2019: CCP, RF positive, ESR, CRP normal, Hep B, C, quant gold negative  2/2019: cbc, CMP unremarkable    Imaging:   CT chest (2/6/21):    1. The lungs demonstrate persistent abnormality bilaterally this is most pronounced in the periphery of the upper lobes anteriorly does involve all lobes. Findings are consistent with peripheral areas of fibrosis and  honeycombing. The pattern is similar. There is a fibrosis appears somewhat better defined in some of the honeycomb cysts are slightly larger. This is only  minimal change. 2. Overall the patient effected a better degree of inspiration on today's examination mild areas of bronchiectasis are noted in the lower lobes. This is better seen on this study than on the previous exam and is mild but may have progressed slightly. 3. No adenopathy CT chest (11/11/19): 1. There are peripheral reticular opacities with honeycombing. This is most pronounced anteriorly in each upper lobe left greater than right also involves the lower lobes. These findings are consistent with pulmonary fibrosis. Pattern  is not typical for UIP. 2. Adenopathy as described above. This could be reactive. Exact etiology is  uncertain. Follow-up exam in 3-6 months may yield more information. Hand X-rays (3/2019): Personally reviewed images. + erosions consistent with RA and severe OA  Foot x-rays (3/2019): Personally reviewed images. + erosions  DEXA (3/2019): T score -2.6 at the left radius    ASSESSMENT AND PLAN    A 78 y.o. female with hx of osteoporosis, seropositive erosive rheumatoid arthritis on leflunomide 20 mg oral daily presents for a follow up visit with inflammatory arthritis still in clinical remission. Low grade hypercalcemia is asymptomatic and presumably related to hydrochlorthiazide use and calcium supplements given previously normal iPTH; will update hypercalcemia labs with next blood work. Advised pt to stop Caltrate supplementation. No changes to immunosuppression today. # Seropositive erosive Rheumatoid arthritis:  - continue leflunomide 20 mg oral daily. 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 Zbigniew Marrufo with ~annual followup due around March 2022, no symptomatic progression    # Osteoporosis:  - s/p Prolia in 4/2020, 11/2020, 5/2021, 11/2021, and today. Due next 11/2022.  -Pt to start at least 1000 units vitamin D daily    # Hand osteoarthritis: largely asymptomatic. Erosive osteoarthritis. - will continue to monitor, cont diclofenac gel as needed    #Hypercalcemia  -Reduce Caltrate to one pill daily  -Check 1,25OHD, iPTH  -Defer to PCP re: adjustments to hydrochlorthiazide if desired    # Medication Toxicity Monitoring:  - cbc, cmp q3mo  - Hepatitis B, C: negative 3/2019--false positive Hep B cAb  - Quant gold: negative 3/2019  - bone health: see above    RTC in 6 months    Patient Instructions   1. Decrease your Caltrate to one tab daily. 2. Continue vitamin D at least 2000 units/day. 3. Repeat labs ASAP, 3 months from now, and once more about a week before your next appointment. 4. Return in 6 months.  Call with increased joint pain, swelling, constipation, or headaches in the meantime. The patient voiced understanding of the aforementioned assessment and plan. Summary of plan was provided in the After Visit Summary patient instructions. I also provided education about MyChart setup and utility. Ms. Pranay Purdy has a reminder for a \"due or due soon\" health maintenance. I have asked that she contact her primary care provider for follow-up on this health maintenance.     TODAY'S ORDERS    Orders Placed This Encounter    METABOLIC PANEL, COMPREHENSIVE    PTH INTACT    VITAMIN D, 1, 25 DIHYDROXY    SED RATE (ESR)    C REACTIVE PROTEIN, QT    C REACTIVE PROTEIN, QT    CBC WITH AUTOMATED DIFF    METABOLIC PANEL, COMPREHENSIVE    SED RATE (ESR)    C REACTIVE PROTEIN, QT    CBC WITH AUTOMATED DIFF    METABOLIC PANEL, COMPREHENSIVE    SED RATE (ESR)     Face to face time: 25 minutes  Note preparation and records review day of service: 20 minutes  Total provider time day of service: 45 minutes       Future Appointments   Date Time Provider Catherine Cisnerosi   11/17/2022  9:00 AM [id number] BS AMB     Lennie Corona MD    Adult Rheumatology   [facility]  A Part of [facility], [address] [facility]   Phone [phone]  Fax [phone]

## 2022-05-19 NOTE — LETTER
5/19/2022    Patient: Hanny Mohr   YOB: 1955   Date of Visit: 5/19/2022     Goldy Agrawal MD  [address]  [address] [facility] [address]    Dear Goldy Agrawal MD,    We recently saw Ms. Hanny Mohr in the [facility] for evaluation. My notes for this consultation are attached. If you have questions, please do not hesitate to call me. I look forward to following your patient along with you.       Sincerely,    Molly Zhao MD [facility]  Cell: [phone]

## 2022-05-19 NOTE — PATIENT INSTRUCTIONS
1. Decrease your Caltrate to one tab daily. 2. Continue vitamin D at least 2000 units/day. 3. Repeat labs ASAP, 3 months from now, and once more about a week before your next appointment. 4. Return in 6 months. Call with increased joint pain, swelling, constipation, or headaches in the meantime.

## 2022-05-20 LAB
1,25(OH)2D SERPL-MCNC: 35 PG/ML (ref 19.9–79.3)
ALBUMIN SERPL-MCNC: 4.4 G/DL (ref 3.8–4.8)
ALBUMIN/GLOB SERPL: 1.5 {RATIO} (ref 1.2–2.2)
ALP SERPL-CCNC: 61 IU/L (ref 44–121)
ALT SERPL-CCNC: 23 IU/L (ref 0–32)
AST SERPL-CCNC: 30 IU/L (ref 0–40)
BILIRUB SERPL-MCNC: 0.4 MG/DL (ref 0–1.2)
BUN SERPL-MCNC: 13 MG/DL (ref 8–27)
BUN/CREAT SERPL: 16 (ref 12–28)
CALCIUM SERPL-MCNC: 11.2 MG/DL (ref 8.7–10.3)
CHLORIDE SERPL-SCNC: 101 MMOL/L (ref 96–106)
CO2 SERPL-SCNC: 26 MMOL/L (ref 20–29)
CREAT SERPL-MCNC: 0.8 MG/DL (ref 0.57–1)
CRP SERPL-MCNC: 5 MG/L (ref 0–10)
EGFR: 81 ML/MIN/1.73
ERYTHROCYTE [SEDIMENTATION RATE] IN BLOOD BY WESTERGREN METHOD: 44 MM/HR (ref 0–40)
GLOBULIN SER CALC-MCNC: 2.9 G/DL (ref 1.5–4.5)
GLUCOSE SERPL-MCNC: 96 MG/DL (ref 65–99)
POTASSIUM SERPL-SCNC: 3.9 MMOL/L (ref 3.5–5.2)
PROT SERPL-MCNC: 7.3 G/DL (ref 6–8.5)
PTH-INTACT SERPL-MCNC: 29 PG/ML (ref 15–65)
SODIUM SERPL-SCNC: 142 MMOL/L (ref 134–144)

## 2022-07-06 ENCOUNTER — TELEPHONE (OUTPATIENT)
Dept: RHEUMATOLOGY | Age: 67
End: 2022-07-06

## 2022-07-06 DIAGNOSIS — M05.9 SEROPOSITIVE RHEUMATOID ARTHRITIS (HCC): ICD-10-CM

## 2022-07-08 DIAGNOSIS — M05.9 SEROPOSITIVE RHEUMATOID ARTHRITIS (HCC): ICD-10-CM

## 2022-07-08 RX ORDER — LEFLUNOMIDE 20 MG/1
20 TABLET ORAL DAILY
Qty: 90 TABLET | Refills: 0 | Status: SHIPPED | OUTPATIENT
Start: 2022-07-08 | End: 2022-09-19

## 2022-07-08 RX ORDER — LEFLUNOMIDE 20 MG/1
20 TABLET ORAL DAILY
Qty: 90 TABLET | Refills: 0 | Status: SHIPPED | OUTPATIENT
Start: 2022-07-08 | End: 2022-07-08

## 2022-08-03 RX ORDER — ATORVASTATIN CALCIUM 40 MG/1
TABLET, FILM COATED ORAL
Qty: 30 TABLET | Refills: 2 | Status: SHIPPED | OUTPATIENT
Start: 2022-08-03 | End: 2022-10-20

## 2022-08-16 LAB
ALBUMIN SERPL-MCNC: 4.1 G/DL (ref 3.8–4.8)
ALBUMIN/GLOB SERPL: 1.6 {RATIO} (ref 1.2–2.2)
ALP SERPL-CCNC: 60 IU/L (ref 44–121)
ALT SERPL-CCNC: 20 IU/L (ref 0–32)
AST SERPL-CCNC: 24 IU/L (ref 0–40)
BASOPHILS # BLD AUTO: 0.1 X10E3/UL (ref 0–0.2)
BASOPHILS NFR BLD AUTO: 1 %
BILIRUB SERPL-MCNC: 0.6 MG/DL (ref 0–1.2)
BUN SERPL-MCNC: 10 MG/DL (ref 8–27)
BUN/CREAT SERPL: 13 (ref 12–28)
CALCIUM SERPL-MCNC: 10.6 MG/DL (ref 8.7–10.3)
CHLORIDE SERPL-SCNC: 100 MMOL/L (ref 96–106)
CO2 SERPL-SCNC: 26 MMOL/L (ref 20–29)
CREAT SERPL-MCNC: 0.8 MG/DL (ref 0.57–1)
CRP SERPL-MCNC: 14 MG/L (ref 0–10)
EGFR: 80 ML/MIN/1.73
EOSINOPHIL # BLD AUTO: 0.6 X10E3/UL (ref 0–0.4)
EOSINOPHIL NFR BLD AUTO: 7 %
ERYTHROCYTE [DISTWIDTH] IN BLOOD BY AUTOMATED COUNT: 11.5 % (ref 11.7–15.4)
ERYTHROCYTE [SEDIMENTATION RATE] IN BLOOD BY WESTERGREN METHOD: 52 MM/HR (ref 0–40)
GLOBULIN SER CALC-MCNC: 2.6 G/DL (ref 1.5–4.5)
GLUCOSE SERPL-MCNC: 105 MG/DL (ref 65–99)
HCT VFR BLD AUTO: 40.2 % (ref 34–46.6)
HGB BLD-MCNC: 13 G/DL (ref 11.1–15.9)
IMM GRANULOCYTES # BLD AUTO: 0 X10E3/UL (ref 0–0.1)
IMM GRANULOCYTES NFR BLD AUTO: 0 %
LYMPHOCYTES # BLD AUTO: 2.6 X10E3/UL (ref 0.7–3.1)
LYMPHOCYTES NFR BLD AUTO: 30 %
MCH RBC QN AUTO: 30.9 PG (ref 26.6–33)
MCHC RBC AUTO-ENTMCNC: 32.3 G/DL (ref 31.5–35.7)
MCV RBC AUTO: 96 FL (ref 79–97)
MONOCYTES # BLD AUTO: 0.7 X10E3/UL (ref 0.1–0.9)
MONOCYTES NFR BLD AUTO: 8 %
NEUTROPHILS # BLD AUTO: 4.7 X10E3/UL (ref 1.4–7)
NEUTROPHILS NFR BLD AUTO: 54 %
PLATELET # BLD AUTO: 292 X10E3/UL (ref 150–450)
POTASSIUM SERPL-SCNC: 3.6 MMOL/L (ref 3.5–5.2)
PROT SERPL-MCNC: 6.7 G/DL (ref 6–8.5)
RBC # BLD AUTO: 4.21 X10E6/UL (ref 3.77–5.28)
SODIUM SERPL-SCNC: 138 MMOL/L (ref 134–144)
WBC # BLD AUTO: 8.6 X10E3/UL (ref 3.4–10.8)

## 2022-09-17 DIAGNOSIS — I10 BENIGN ESSENTIAL HTN: ICD-10-CM

## 2022-09-17 DIAGNOSIS — M05.9 SEROPOSITIVE RHEUMATOID ARTHRITIS (HCC): ICD-10-CM

## 2022-09-19 RX ORDER — HYDROCHLOROTHIAZIDE 25 MG/1
TABLET ORAL
Qty: 30 TABLET | Refills: 0 | OUTPATIENT
Start: 2022-09-19

## 2022-09-19 RX ORDER — LEFLUNOMIDE 20 MG/1
TABLET ORAL
Qty: 90 TABLET | Refills: 0 | Status: SHIPPED | OUTPATIENT
Start: 2022-09-19

## 2022-09-30 ENCOUNTER — OFFICE VISIT (OUTPATIENT)
Dept: FAMILY MEDICINE CLINIC | Age: 67
End: 2022-09-30
Payer: MEDICARE

## 2022-09-30 VITALS
SYSTOLIC BLOOD PRESSURE: 126 MMHG | HEART RATE: 85 BPM | OXYGEN SATURATION: 96 % | TEMPERATURE: 97.3 F | HEIGHT: 61 IN | DIASTOLIC BLOOD PRESSURE: 81 MMHG | BODY MASS INDEX: 28.13 KG/M2 | RESPIRATION RATE: 20 BRPM | WEIGHT: 149 LBS

## 2022-09-30 DIAGNOSIS — I10 BENIGN ESSENTIAL HTN: Primary | ICD-10-CM

## 2022-09-30 DIAGNOSIS — M05.79 RHEUMATOID ARTHRITIS INVOLVING MULTIPLE SITES WITH POSITIVE RHEUMATOID FACTOR (HCC): ICD-10-CM

## 2022-09-30 DIAGNOSIS — M81.0 AGE-RELATED OSTEOPOROSIS WITHOUT CURRENT PATHOLOGICAL FRACTURE: ICD-10-CM

## 2022-09-30 PROCEDURE — 1090F PRES/ABSN URINE INCON ASSESS: CPT | Performed by: STUDENT IN AN ORGANIZED HEALTH CARE EDUCATION/TRAINING PROGRAM

## 2022-09-30 PROCEDURE — G8754 DIAS BP LESS 90: HCPCS | Performed by: STUDENT IN AN ORGANIZED HEALTH CARE EDUCATION/TRAINING PROGRAM

## 2022-09-30 PROCEDURE — G8427 DOCREV CUR MEDS BY ELIG CLIN: HCPCS | Performed by: STUDENT IN AN ORGANIZED HEALTH CARE EDUCATION/TRAINING PROGRAM

## 2022-09-30 PROCEDURE — G8417 CALC BMI ABV UP PARAM F/U: HCPCS | Performed by: STUDENT IN AN ORGANIZED HEALTH CARE EDUCATION/TRAINING PROGRAM

## 2022-09-30 PROCEDURE — 1101F PT FALLS ASSESS-DOCD LE1/YR: CPT | Performed by: STUDENT IN AN ORGANIZED HEALTH CARE EDUCATION/TRAINING PROGRAM

## 2022-09-30 PROCEDURE — 3017F COLORECTAL CA SCREEN DOC REV: CPT | Performed by: STUDENT IN AN ORGANIZED HEALTH CARE EDUCATION/TRAINING PROGRAM

## 2022-09-30 PROCEDURE — G0463 HOSPITAL OUTPT CLINIC VISIT: HCPCS | Performed by: STUDENT IN AN ORGANIZED HEALTH CARE EDUCATION/TRAINING PROGRAM

## 2022-09-30 PROCEDURE — 99213 OFFICE O/P EST LOW 20 MIN: CPT | Performed by: STUDENT IN AN ORGANIZED HEALTH CARE EDUCATION/TRAINING PROGRAM

## 2022-09-30 PROCEDURE — G8510 SCR DEP NEG, NO PLAN REQD: HCPCS | Performed by: STUDENT IN AN ORGANIZED HEALTH CARE EDUCATION/TRAINING PROGRAM

## 2022-09-30 PROCEDURE — G8536 NO DOC ELDER MAL SCRN: HCPCS | Performed by: STUDENT IN AN ORGANIZED HEALTH CARE EDUCATION/TRAINING PROGRAM

## 2022-09-30 PROCEDURE — G9899 SCRN MAM PERF RSLTS DOC: HCPCS | Performed by: STUDENT IN AN ORGANIZED HEALTH CARE EDUCATION/TRAINING PROGRAM

## 2022-09-30 PROCEDURE — G8752 SYS BP LESS 140: HCPCS | Performed by: STUDENT IN AN ORGANIZED HEALTH CARE EDUCATION/TRAINING PROGRAM

## 2022-09-30 PROCEDURE — 1123F ACP DISCUSS/DSCN MKR DOCD: CPT | Performed by: STUDENT IN AN ORGANIZED HEALTH CARE EDUCATION/TRAINING PROGRAM

## 2022-09-30 RX ORDER — HYDROCHLOROTHIAZIDE 25 MG/1
25 TABLET ORAL DAILY
Qty: 90 TABLET | Refills: 1 | Status: SHIPPED | OUTPATIENT
Start: 2022-09-30

## 2022-09-30 NOTE — PROGRESS NOTES
Chief Complaint   Patient presents with    Medication Refill     Visit Vitals  /79 (BP 1 Location: Right upper arm, BP Patient Position: Sitting, BP Cuff Size: Adult)   Pulse 91   Temp 97.3 °F (36.3 °C) (Temporal)   Resp 20   Ht 5' 0.5\" (1.537 m)   Wt 149 lb (67.6 kg)   SpO2 96%   BMI 28.62 kg/m²     1. Have you been to the ER, urgent care clinic since your last visit? Hospitalized since your last visit? Yes, on 7/25/2022 at [facility] for bronchitis. 2. Have you seen or consulted any other health care providers outside of the [address] [place] [address] since your last visit? Include any pap smears or colon screening.  No

## 2022-09-30 NOTE — PROGRESS NOTES
Subjective   CC:  Charlene Jeff is a 75 y.o. female who presents for f/up HTN. HTN: Doing well on current dose of HCTZ. Doesn't check BP at home. No f/c, cough/colds, Has, chest pain, palp, dyspnea, abd pain, n/v, c/d.     RA: Managed by rheum. Osteoporosis: Prolia injection every 6 months, next in November. Just returned from [facility] and [place]. Planning on getting COVID booster soon. Complete ROS performed and pertinent positives/negatives are documented in HPI. Past Medical History  Past Medical History:   Diagnosis Date    Age-related osteoporosis without current pathological fracture 6/17/2021    Osteoporosis     Rheumatoid arthritis ([facility] [address]        Current Medications  Current Outpatient Medications   Medication Sig Dispense Refill    hydroCHLOROthiazide (HYDRODIURIL) 25 mg tablet Take 1 Tablet by mouth daily. PLEASE SCHEDULE FOLLOW UP VISIT WITH PCP PRIOR TO NEXT REFILL. 90 Tablet 1    leflunomide (ARAVA) 20 mg tablet TAKE 1 TABLET DAILY 90 Tablet 0    atorvastatin (LIPITOR) 40 mg tablet TAKE 1 TABLET DAILY 30 Tablet 2    fluticasone/vilanterol (BREO ELLIPTA IN) Take  by inhalation. denosumab (PROLIA) 60 mg/mL injection 60 mg by SubCUTAneous route once.  Indications: patient gets this injection every 6 months         Allergies  No Known Allergies    Social History   Social History     Socioeconomic History    Marital status:      Spouse name: Not on file    Number of children: Not on file    Years of education: Not on file    Highest education level: Not on file   Occupational History    Not on file   Tobacco Use    Smoking status: Former    Smokeless tobacco: Never    Tobacco comments:     Quit 3 Years Ago   Vaping Use    Vaping Use: Never used   Substance and Sexual Activity    Alcohol use: Yes     Comment: Social    Drug use: No    Sexual activity: Never   Other Topics Concern    Not on file   Social History Narrative    Not on file     Social Determinants of Health Financial Resource Strain: Not on file   Food Insecurity: Not on file   Transportation Needs: Not on file   Physical Activity: Not on file   Stress: Not on file   Social Connections: Not on file   Intimate Partner Violence: Not on file   Housing Stability: Not on file       Family History  Family History   Problem Relation Age of Onset    No Known Problems Mother     No Known Problems Father        Objective   Vital Signs  Visit Vitals  /81 (BP 1 Location: Right upper arm, BP Patient Position: Sitting, BP Cuff Size: Adult)   Pulse 85   Temp 97.3 °F (36.3 °C) (Temporal)   Resp 20   Ht 5' 0.5\" (1.537 m)   Wt 149 lb (67.6 kg)   SpO2 96%   BMI 28.62 kg/m²       Physical Examination  Physical Exam  Constitutional:       Appearance: Normal appearance. HENT:      Head: Normocephalic and atraumatic. Eyes:      Conjunctiva/sclera: Conjunctivae normal.   Cardiovascular:      Rate and Rhythm: Normal rate and regular rhythm. Pulmonary:      Effort: Pulmonary effort is normal.      Breath sounds: Normal breath sounds. Abdominal:      General: Abdomen is flat. Palpations: Abdomen is soft. Musculoskeletal:         General: Normal range of motion. Cervical back: Normal range of motion and neck supple. Skin:     General: Skin is warm. Neurological:      General: No focal deficit present. Mental Status: She is alert and oriented to person, place, and time. Assessment and Plan   Óscar Jacob is a 75 y.o. who is here for f/up HTN. 1. Benign essential HTN  BP at goal today <130/<80. Will continue on current HCTZ dose. Labs done recently - lytes and Cr ok. Refills provided x 6 months. - hydroCHLOROthiazide (HYDRODIURIL) 25 mg tablet; Take 1 Tablet by mouth daily. PLEASE SCHEDULE FOLLOW UP VISIT WITH PCP PRIOR TO NEXT REFILL. Dispense: 90 Tablet; Refill: 1    2. Rheumatoid arthritis involving multiple sites with positive rheumatoid factor (HCC)  F/b rheum, continue routine f/up.     3. Age-related osteoporosis without current pathological fracture  Prolia injections every 6 months, due in November. RTC 6 months for f/up HTN. Patient is counseled to return to the office if symptoms do not improve as expected. Urgent consultation with the nearest Emergency Department is strongly recommended if condition worsens. Patient is counseled to follow up as recommended and to inform the office if any changes in treatment are recommended.       I discussed this patient with Dr. Laura Hughes (Attending Physician)       Signed By:  Juliane Al MD  Family Medicine Resident

## 2022-10-03 NOTE — PROGRESS NOTES
[address] Medicine Residency Attending Addendum:  I saw and evaluated the patient on the day of the encounter with Leandro Jung MD  , performing the key elements of the service. I discussed the findings, assessment and plan with the resident and agree with the resident's findings and plan as documented in the resident's note.       Inocencia Parker MD, CAQSM, RMSK

## 2022-10-20 RX ORDER — ATORVASTATIN CALCIUM 40 MG/1
TABLET, FILM COATED ORAL
Qty: 30 TABLET | Refills: 2 | Status: SHIPPED | OUTPATIENT
Start: 2022-10-20

## 2022-11-08 NOTE — PROGRESS NOTES
[facility] Rheumatology  OBIC Note    Date: 2022  Name: Sandra Amaya  MRN: [id number]       : 1955  Diagnosis: Osteoporosis  Treatment: Prolia  Referring Provider: Dr. Javier Reese  Supervising Provider: Dr. Javier Reese    [facility]. Ms. Frances allergies reviewed and she agreed to receiving today's treatment. Visit Vitals  BP (!) 144/89   Pulse 92   Temp 98 °F (36.7 °C)   Resp 16   SpO2 98%     Recent labs results:   22 12:26   Sodium 141   Potassium 4.0   Chloride 102   CO2 24   Glucose 102 (H)   BUN 10   Creatinine 0.66   BUN/Creatinine ratio 15   Calcium 11.0 (H)   eGFR 95   Bilirubin, total 0.4   Protein, total 6.7   Albumin 4.1   A-G Ratio 1.6   ALT 17   AST 27   Alk. phosphatase 55     Medications given per providers orders:  Administrations This Visit       denosumab (PROLIA) injection 60 mg       Admin Date  2022 Action  Given Dose  60 mg Route  SubCUTAneous Administered By  Donnise Severe, RN                    Prolia to right arm  [address] [id number]  Lot # [id number]  Exp 2024    Ms. Frances tolerated the treatment without complaints and no medication reactions were seen while in presence of this RN. Patient provided with AVS, which included future appointments and written educational material regarding Prolia. All of the patients questions were answered and then discharged ambulatory from Rheumatology [facility] in stable condition at 0910. Encounter routed to supervising provider for co-signing.      Future Appointments   Date Time Provider Catherine Reynaga   2022 12:00 PM MD MICHAEL Alfonso BS [facility]   2022  2:40 PM Juliana Mccabe MD [facility] BS [facility]   2023  9:20 AM MD MICHAEL Alfonso BS [facility]     Kulwinder Jeffers RN  2022

## 2022-11-15 DIAGNOSIS — M05.9 SEROPOSITIVE RHEUMATOID ARTHRITIS (HCC): ICD-10-CM

## 2022-11-15 RX ORDER — LEFLUNOMIDE 20 MG/1
TABLET ORAL
Qty: 90 TABLET | Refills: 0 | Status: SHIPPED | OUTPATIENT
Start: 2022-11-15

## 2022-11-17 ENCOUNTER — OFFICE VISIT (OUTPATIENT)
Dept: RHEUMATOLOGY | Age: 67
End: 2022-11-17
Payer: MEDICARE

## 2022-11-17 VITALS
SYSTOLIC BLOOD PRESSURE: 144 MMHG | RESPIRATION RATE: 16 BRPM | OXYGEN SATURATION: 98 % | TEMPERATURE: 98 F | DIASTOLIC BLOOD PRESSURE: 89 MMHG | HEART RATE: 92 BPM

## 2022-11-17 VITALS
DIASTOLIC BLOOD PRESSURE: 89 MMHG | RESPIRATION RATE: 16 BRPM | HEART RATE: 92 BPM | OXYGEN SATURATION: 98 % | TEMPERATURE: 98 F | SYSTOLIC BLOOD PRESSURE: 144 MMHG

## 2022-11-17 DIAGNOSIS — M81.0 AGE-RELATED OSTEOPOROSIS WITHOUT CURRENT PATHOLOGICAL FRACTURE: ICD-10-CM

## 2022-11-17 DIAGNOSIS — M05.9 SEROPOSITIVE RHEUMATOID ARTHRITIS (HCC): Primary | ICD-10-CM

## 2022-11-17 DIAGNOSIS — M81.0 AGE-RELATED OSTEOPOROSIS WITHOUT CURRENT PATHOLOGICAL FRACTURE: Primary | ICD-10-CM

## 2022-11-17 DIAGNOSIS — Z79.899 ONGOING USE OF POSSIBLY TOXIC MEDICATION: ICD-10-CM

## 2022-11-17 DIAGNOSIS — E83.52 HYPERCALCEMIA: ICD-10-CM

## 2022-11-17 PROCEDURE — G0463 HOSPITAL OUTPT CLINIC VISIT: HCPCS | Performed by: INTERNAL MEDICINE

## 2022-11-17 PROCEDURE — 96372 THER/PROPH/DIAG INJ SC/IM: CPT

## 2022-11-17 RX ORDER — MONTELUKAST SODIUM 10 MG/1
TABLET ORAL
COMMUNITY
Start: 2022-10-04

## 2022-11-17 RX ORDER — FLUTICASONE FUROATE AND VILANTEROL TRIFENATATE 200; 25 UG/1; UG/1
POWDER RESPIRATORY (INHALATION)
COMMUNITY
Start: 2022-09-14

## 2022-11-17 RX ORDER — BENZONATATE 200 MG/1
CAPSULE ORAL
COMMUNITY
Start: 2022-10-10

## 2022-11-17 RX ORDER — DICLOFENAC SODIUM 10 MG/G
GEL TOPICAL
COMMUNITY
Start: 2022-10-15

## 2022-11-17 RX ADMIN — DENOSUMAB 60 MG: 60 INJECTION SUBCUTANEOUS at 08:59

## 2022-11-17 NOTE — PROGRESS NOTES
REASON FOR VISIT    Patient presents for a follow up visit for seropositive RA and osteoporosis. HISTORY OF DISEASE: Seropositive Rheumatoid Arthritis; erosive    Year of diagnosis: 2013  First visit with this clinic:  3/2019  Cumulative disease manifestations:   Positive serologies/labs: RF, CCP  Negative serologies/labs:   Extra-articular comorbidities: Pulmonary fibrosis   Other co-morbidities:  Osteoporosis, low-grade hyperCa  Relapses: 12/2018 but on not treatment     Past treatment history:  Prednisone:   Non-biologic DMARD: Methotrexate 20 mg oral weekly (2013-1/2020; d/blake due to hair loss) Leflunomide 20 mg oral daily (1/2020-present)  Biologic:  Other: Prolia 11/2019-  Broke wrist in 2006, never felt it healed entirely, went for Xrays and told she had arthritis. Started methotrexate but alopecia. Changed to leflunomide with better tolerability and joint control. HISTORY OF PRESENT ILLNESS     Pt returns for a follow up. LV 5/19/22. Tolerating leflunomide well. No interval infections. Calcium remains high. Has stopped Caltrate but still takes hydrochlorthiazide. Denies headaches or constipation. May calcitriol and iPTH levels had been appropriately low-normal.      REVIEW OF SYSTEMS    A comprehensive review of systems was performed and pertinent results are documented in the HPI, review of systems is otherwise non-contributory. PAST MEDICAL HISTORY    Past Medical History:   Diagnosis Date    Age-related osteoporosis without current pathological fracture 6/17/2021    Osteoporosis     Rheumatoid arthritis ([facility] [address]         No past surgical history on file.     FAMILY HISTORY    Family History   Problem Relation Age of Onset    No Known Problems Mother     No Known Problems Father        SOCIAL HISTORY    Social History     Tobacco Use    Smoking status: Former    Smokeless tobacco: Never    Tobacco comments:     Quit 3 Years Ago   Vaping Use    Vaping Use: Never used   Substance Use Topics Alcohol use: Yes     Comment: Social    Drug use: No       MEDICATIONS    Current Outpatient Medications   Medication Sig Dispense Refill    Breo Ellipta 200-25 mcg/dose inhaler       leflunomide (ARAVA) 20 mg tablet TAKE 1 TABLET DAILY 90 Tablet 0    atorvastatin (LIPITOR) 40 mg tablet TAKE 1 TABLET DAILY 30 Tablet 2    hydroCHLOROthiazide (HYDRODIURIL) 25 mg tablet Take 1 Tablet by mouth daily. PLEASE SCHEDULE FOLLOW UP VISIT WITH PCP PRIOR TO NEXT REFILL. 90 Tablet 1    denosumab (PROLIA) 60 mg/mL injection 60 mg by SubCUTAneous route once. Indications: patient gets this injection every 6 months         ALLERGIES    No Known Allergies    PHYSICAL EXAMINATION  Visit Vitals  BP (!) 144/89   Pulse 92   Temp 98 °F (36.7 °C)   Resp 16   SpO2 98%     General:  The patient is well developed, well nourished, alert, and in no apparent distress. Eyes: Sclera are anicteric. No conjunctival injection. HEENT:  Oropharynx is clear. No oral ulcers. Adequate salivary pooling. No cervical or supraclavicular lymphadenopathy. Lungs: No interval return of basilar crackles, no wheeze or stridor. Normal respiratory effort. Cor:  Regular rate and rhythm. No murmurs rubs or gallops. Abdomen: Soft, non-tender, without hepatomegaly or masses. Extremities: No calf tenderness or edema. Warm and well perfused. Skin:  No significant abnormalities. No petechial, purpuric, or psoriaform rashes   Neuro: Nonfocal  Musculoskeletal:    A comprehensive musculoskeletal exam was performed for all joints of each upper and lower extremity and assessed for swelling, tenderness and range of motion. Results are documented as below:  Limited flexion/extension right > left bilateral wrists 2/2 damage, no active left wrist or left 1st MCP synovitis. Still resolved ankle synovitis, hamertoes without MTP squeeze tenderness.        DATA REVIEW    Prior medical records were reviewed and if applicable are summarized as below:    Labs:   11/7/22: ESR 40, Cr 0..66, Ca 11.0, LFT WNL, CBC WNL, CRP 9 mg/L  8/15/22: ESR 52, Cr 0.8, LFT WNL,  CBC WNL, CRP 14 mg/L  5/16/22: Cr 0.80, Ca 11.2  2/18/22: WBC 10.1, Hgb 13.3, Plt 403; ESR 52; Cr 0.85, Ca 11.1, LFTs WNL , CRP 4mg/L, Vit D 53  11/12/21: WBC 8.6, Hgb 12.6, Plt 335; ESR 12, Cr 0.81, Ca 10.8, LFTs WNL  9/14/21:   6/9/21: ESR 75, LFTs WNL, CRP 9mg/L  5/10/21: Cr 0.96, Ca 10.3, no LFTs or CBC.  11/2020: Vit D 45; TSH WNL, Ca 10.7->10.0, BMP WNL  7/2020:  CBC WNL, Ca 10.9, Cr 0.84, CMP WNL; Hep B SAg neg, SAb neg, IgM cAb+, total cAb neg, eAg neg; QFN neg  1/2020: Cbc, cmp unremarkable  10/2019: cbc with thrombocytosis, elevated PMNs, lymph, monocytes, CMP normal  7/2019: Cbc, cmp unremarkable  5/2019: BMP normal  3/2019: CCP, RF positive, ESR, CRP normal, Hep B, C, quant gold negative  2/2019: cbc, CMP unremarkable    Imaging:   CT chest (2/6/21):    1. The lungs demonstrate persistent abnormality bilaterally this is most pronounced in the periphery of the upper lobes anteriorly does involve all lobes. Findings are consistent with peripheral areas of fibrosis and  honeycombing. The pattern is similar. There is a fibrosis appears somewhat better defined in some of the honeycomb cysts are slightly larger. This is only  minimal change. 2. Overall the patient effected a better degree of inspiration on today's examination mild areas of bronchiectasis are noted in the lower lobes. This is better seen on this study than on the previous exam and is mild but may have progressed slightly. 3. No adenopathy CT chest (11/11/19): 1. There are peripheral reticular opacities with honeycombing. This is most pronounced anteriorly in each upper lobe left greater than right also involves the lower lobes. These findings are consistent with pulmonary fibrosis. Pattern  is not typical for UIP. 2. Adenopathy as described above. This could be reactive. Exact etiology is  uncertain.  Follow-up exam in 3-6 months may yield more information. Hand X-rays (3/2019): Personally reviewed images. + erosions consistent with RA and severe OA  Foot x-rays (3/2019): Personally reviewed images. + erosions  DEXA (3/2019): T score -2.6 at the left radius    ASSESSMENT AND PLAN    A 75 y.o. female with hx of osteoporosis, seropositive erosive rheumatoid arthritis on leflunomide 20 mg oral daily presents for a follow up visit with inflammatory arthritis still in clinical remission. Low grade hypercalcemia is asymptomatic and presumably still related to hydrochlorthiazide use; given minimal symptoms, not critical to replace hydrochlorthiazide. No changes to immunosuppression today. # Seropositive erosive Rheumatoid arthritis:  - continue leflunomide 20 mg oral daily. 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 Aureliano Almanza with ~annual followup due around March 2022, no symptomatic progression    # Osteoporosis:  - s/p Prolia since 4/2020, received uneventfully today. Due next 5/2023  - Holding off on repeat DXA as patient not currently interested in more anabolic treatment alternatives  -Normal vitamin D levels 2/2022, no changes    #Hypercalcemia  -Defer to PCP re: adjustments to hydrochlorthiazide if desired    # Medication Toxicity Monitoring:  - cbc, cmp q3mo  - Hepatitis B, C: negative 3/2019--false positive Hep B cAb  - Quant gold: negative 7/20  - bone health: see above    RTC in 6 months    There are no Patient Instructions on file for this visit. The patient voiced understanding of the aforementioned assessment and plan. Summary of plan was provided in the After Visit Summary patient instructions. I also provided education about Aur[employer] setup and utility. Ms. Ann Hoyos has a reminder for a \"due or due soon\" health maintenance. I have asked that she contact her primary care provider for follow-up on this health maintenance.     TODAY'S ORDERS    Orders Placed This Encounter    diclofenac (VOLTAREN) 1 % gel    montelukast (SINGULAIR) 10 mg tablet benzonatate (TESSALON) 200 mg capsule       Face to face time: 15 minutes  Note preparation and records review day of service: 20 minutes  Total provider time day of service: 35 minutes       Future Appointments   Date Time Provider Catherine Reynaga   11/17/2022 12:00 PM Benito Hoyos MD AO BS [facility]   12/1/2022  2:40 PM Merary Cassidy MD [facility]   2/13/2023  9:20 AM Benito Hoyos MD [facility] BS AMB     This was scribed by Thaddeus Huang in the presence of Dr. Fabrice Urbina. The note was reviewed and amended personally, and I agree with the above information.     Gabriella Santa MD    Adult Rheumatology   [facility]  A Part of [facility]  [facility], [address] [place] [address]   Phone [phone]  Fax [phone]

## 2022-11-17 NOTE — PROGRESS NOTES
Chief Complaint   Patient presents with    Arthritis     1. Have you been to the ER, urgent care clinic since your last visit? Hospitalized since your last visit? No    2. Have you seen or consulted any other health care providers outside of the [address] [place] [address] since your last visit? Include any pap smears or colon screening.  No

## 2022-12-01 ENCOUNTER — TELEPHONE (OUTPATIENT)
Dept: RHEUMATOLOGY | Age: 67
End: 2022-12-01

## 2022-12-01 ENCOUNTER — OFFICE VISIT (OUTPATIENT)
Dept: FAMILY MEDICINE CLINIC | Age: 67
End: 2022-12-01

## 2022-12-01 NOTE — TELEPHONE ENCOUNTER
Pt called office stating receiving a call from Dr. Byron Cheung but did not answer it. Pt stated no vm was left. Please advise.     Phone # [phone] d/c'rosario Cee

## 2022-12-01 NOTE — PROGRESS NOTES
Preoperative Evaluation for Shelley Lyn     12/1/2022  No chief complaint on file. HPI:   hSelley Lyn is a 75 y.o. female referred for evaluation by:{REFERRAL SOURCE:45542} for Pre- Op Evaluation. Type of surgery and indication: ***  Surgery site : ***  Anesthesia type: ***  Date of procedure:  ***    Review of Systems   Review of Systems : negative unless highlighted  CONSTITUTIONAL: fevers. Chills. Anorexia. Weight loss. Weight gain. EYES: diplopia. Blurry vision. Visual changes  ENT: sinus congestion. Rhinorrhea. CARDIOVASCULAR: chest pain. palpitations  RESPIRATORY: dyspnea. Cough. Wheeze. GI: abdominal pain. Hemetochezia. Melana. : dysuria. Hematuria. Urgency. Itching. Burning. Discharge. NEURO: numbness. Tingling. Weakness. MUSCULOSKELETAL: arthralgias. Myalgias. Edema. SKIN: rash. Itching. Dryness. Flushing. PSYCH: anxiety. Depression. Irritability. Suicidal ideation. Homicidal ideation.      Inherent Risk of Surgery   Surgical risk:  ***  Low:  Cataract, breast, dental, endoscopy, superficial  Intermediate:  Orthopedic, abdominal, thoracic, carotid endarterctomy, prostate, head and neck  High:  Vascular, aortic, emergent, high risk of blood loss, anticipated prolonged    Patient Cardiac Risk Assessment   Risk Assessment using Revised Osborne cardiac risk index (RCRI):  High-risk type of surgery (examples include vascular surgery and any open intraperitoneal or intrathoracic procedures):   History of ischemic heart disease (i.e. MI or a positive exercise test, current complaint of chest pain, use of nitrate therapy, or ECG with pathological Q waves):   History of HF:   History of cerebrovascular disease:   Diabetes mellitus requiring treatment with insulin:   Preoperative serum creatinine >2.0 mg/dL (177 µmol/L):     Rate if cardiac death, nonfatal MI, nonfatal cardiac arrest by number of risk factor  None - 0.4%  1  1.0%  2 2.4%  3+ 5.4%    WON/AHA 2007 Guidelines:   1) Surgery Emergency, Non-cardiac -> to surgery  2) If not, look at clinical predictors  Major Intermediate Minor   Unstable CAD (recent MI, severe angina) Mild angina Advanced Age   Decompensated CHF Prior MI Abnormal EKG   Severe Valvular Disease Compensated/Prior CHF Rhythm other than sinus   Arrhythmias (AV block, uncontrolled vent rate, sxs) Diabetes Low METS (<4)    Renal Insuficiency Hx of CVA     Uncontrolled HTN     Coumadin   high risk= mechanical heart valve, VTE with hypercoag state, VTE < 3 months  Low risk= AF w/o CVA, h/o DVT > 3 months and NO hypercoag state    Assessment of Cardiac Functional Status   Can perform 4 Mets? ***. Prior cardiac evaluation:   ***    <4 METS >4 METS   Care for self Climb a flight of stairs or a hill   Walk indoors around housse Walk on level ground at 4 mph   Walk 2-3 blocks on level ground (2-3 mph) Run a short distance   Light work around house (dust, dishes) Heavy work around house (scrub floors, move furniture)     Other Risk Factors    Screening for ETOH use:  ***Done and low risk  Smoking status:  ***    Personal or FH of bleeding problems:  ***no  Personal or FH of blood clots:  ***no  Personal or FH of anesthesia problems:  ***no    Pulmonary Risk:  Asthma or COPD:  ***no  ***Obesity:  There is no height or weight on file to calculate BMI.   Surgery close to diaphragm:  ***no  Known CHANNING:  ***no  Albumin ***normal, BUN ***normal  Must quit smoking > 8 weeks pre-op    Past Medical, Surgical, Social History   Allergies  Latex allergy: {yes no:047903}  Prior reactions to anesthesia:  None  No Known Allergies     Medications- review any meds requiring changes prior to surgery   Current Outpatient Medications   Medication Sig    Breo Ellipta 200-25 mcg/dose inhaler     diclofenac (VOLTAREN) 1 % gel APPLY 4 GRAMS TOPICALLY TO THE AFFECTED AREA FOUR TIMES DAILY AS NEEDED FOR PAIN    montelukast (SINGULAIR) 10 mg tablet     benzonatate (TESSALON) 200 mg capsule TAKE 1 CAPSULE BY MOUTH THREE TIMES DAILY AS NEEDED    leflunomide (ARAVA) 20 mg tablet TAKE 1 TABLET DAILY    atorvastatin (LIPITOR) 40 mg tablet TAKE 1 TABLET DAILY    hydroCHLOROthiazide (HYDRODIURIL) 25 mg tablet Take 1 Tablet by mouth daily. PLEASE SCHEDULE FOLLOW UP VISIT WITH PCP PRIOR TO NEXT REFILL. denosumab (PROLIA) 60 mg/mL injection 60 mg by SubCUTAneous route once. Indications: patient gets this injection every 6 months     No current facility-administered medications for this visit. Past Medical History   Past Medical History:   Diagnosis Date    Age-related osteoporosis without current pathological fracture 6/17/2021    Osteoporosis     Rheumatoid arthritis ([facility] [address]        Immunizations  Immunization History   Administered Date(s) Administered     Influenza, FLUZONE (age 72 y+), High Dose 10/13/2020    COVID-19, MODERNA BLUE border, Primary or Immunocompromised, (age 18y+), IM, 100 mcg/0.5mL 03/28/2021, 04/18/2021    Influenza Vaccine 10/01/2021    Pneumococcal Conjugate (PCV-13) 09/27/2019    Pneumococcal Polysaccharide (PPSV-23) 10/13/2020    Tdap 01/01/2016    Zoster Recombinant 10/01/2020, 10/30/2020       Objective   There were no vitals filed for this visit. Physical Exam  Constitutional: *** Appears well,  No Acute Distress, Vitals noted  Psychiatric:  *** Affect normal, Alert and Oriented to person/place/time  Eyes:  *** Pupils equally round and reactive, EOMI, conjunctiva clear, eyelids normal  ENT:  *** External ears and nose normal/lips, teeth=***OK/gums normal, TMs and Orophyarynx normal  Neck:  *** general inspection and Thyroid normal.  No abnormal cervical or supraclavicular nodes  Lungs:  *** clear to auscultation, good respiratory effort. Breath sounds equal. No wheezes. Heart:  *** Ausculation normal.  Regular rhythm. No cardiac murmurs. No carotid bruits or palpable thrills. Chest wall normal  Abdomen - Soft, non tender. Non distended.    Musculoskeletal - Normal ROM, Gait normal.    Neurological - No focal deficits. Speech normal.   Extremities:  *** without edema, good peripheral pulses  Skin:  *** Warm to palpation, without rashes, bruising, or suspicious lesions     No results found for this or any previous visit (from the past 24 hour(s)). EKG Results       None            Assessment and Plan   1) Preoperative Clearance  Lillian Delgado is scheduled to have noncardiac surgery and has been evaluated for the risk of a cardiovascular perioperative cardiac event. female is cleared for surgery and the estimated risk of an adverse cardiac event using the Revised Osborne Cardiac Risk Index (RCRI) is ***. There is no additional cardiovascular testing required as risk of adverse event including death is <1%. ? No risk factors - 0.4 percent   ? One risk factor - 1.0 percent   ? Two risk factors - 2.4 percent   ? Three or more risk factors - 5.4 percent     Per ACC/AHA guidelines, patient is *** risk for a(n) *** risk surgery and ***may proceed to planned surgery with the above noted risk/***needs further evaluation with *** before planned procedure. There are no diagnoses linked to this encounter. If major predictors  Delay surgery and manage medically vs angiography   If intermediate predictors Low risk surgery    >4 METS        <4 METS Go to surgery    Go to surgery if intermed. risk, non-invasive testing if high risk surgery    Non-invasive testing   If minor predictors >4 METS  <4 METS Go to surgery  Non-invasive testing if high risk surgery     Consider EST if   -any cardiac symptoms  -PTCA < 6 months OR > 5 years ago    (NO EST if normal EST < 2 years, CABG and NO SX , 5 years, PTCA and NO SX 6 mo to 5 years)    Coumadin   high risk:  Bridge with Lovenox  Low risk:  Stop 5 days prior to surgery    If RCRS > 2 provide beta blockade  EKG:   Indication:  Male > 39, female > 50, one cardiac risk factor  HGB:  If risk of bleeding  Glucose > age 39 or DM  BHCG in females            I discussed the aforementioned diagnoses with the patient as well as the plan of care.         Xuan Quach MD

## 2022-12-19 ENCOUNTER — OFFICE VISIT (OUTPATIENT)
Dept: FAMILY MEDICINE CLINIC | Age: 67
End: 2022-12-19
Payer: MEDICARE

## 2022-12-19 VITALS
OXYGEN SATURATION: 94 % | RESPIRATION RATE: 16 BRPM | HEIGHT: 61 IN | DIASTOLIC BLOOD PRESSURE: 79 MMHG | WEIGHT: 144 LBS | TEMPERATURE: 97.5 F | HEART RATE: 96 BPM | BODY MASS INDEX: 27.19 KG/M2 | SYSTOLIC BLOOD PRESSURE: 117 MMHG

## 2022-12-19 DIAGNOSIS — Z01.818 PRE-OP EXAMINATION: Primary | ICD-10-CM

## 2022-12-19 LAB
BILIRUB UR QL STRIP: NEGATIVE
GLUCOSE UR-MCNC: NEGATIVE MG/DL
KETONES P FAST UR STRIP-MCNC: NEGATIVE MG/DL
PH UR STRIP: 7 [PH] (ref 4.6–8)
PROT UR QL STRIP: NORMAL
SP GR UR STRIP: 1.02 (ref 1–1.03)
UA UROBILINOGEN AMB POC: NORMAL (ref 0.2–1)
URINALYSIS CLARITY POC: CLEAR
URINALYSIS COLOR POC: YELLOW
URINE BLOOD POC: NEGATIVE
URINE LEUKOCYTES POC: NEGATIVE
URINE NITRITES POC: NEGATIVE

## 2022-12-19 PROCEDURE — 3078F DIAST BP <80 MM HG: CPT | Performed by: STUDENT IN AN ORGANIZED HEALTH CARE EDUCATION/TRAINING PROGRAM

## 2022-12-19 PROCEDURE — G9899 SCRN MAM PERF RSLTS DOC: HCPCS | Performed by: STUDENT IN AN ORGANIZED HEALTH CARE EDUCATION/TRAINING PROGRAM

## 2022-12-19 PROCEDURE — 3074F SYST BP LT 130 MM HG: CPT | Performed by: STUDENT IN AN ORGANIZED HEALTH CARE EDUCATION/TRAINING PROGRAM

## 2022-12-19 PROCEDURE — G8432 DEP SCR NOT DOC, RNG: HCPCS | Performed by: STUDENT IN AN ORGANIZED HEALTH CARE EDUCATION/TRAINING PROGRAM

## 2022-12-19 PROCEDURE — 3017F COLORECTAL CA SCREEN DOC REV: CPT | Performed by: STUDENT IN AN ORGANIZED HEALTH CARE EDUCATION/TRAINING PROGRAM

## 2022-12-19 PROCEDURE — G0463 HOSPITAL OUTPT CLINIC VISIT: HCPCS | Performed by: STUDENT IN AN ORGANIZED HEALTH CARE EDUCATION/TRAINING PROGRAM

## 2022-12-19 PROCEDURE — 1123F ACP DISCUSS/DSCN MKR DOCD: CPT | Performed by: STUDENT IN AN ORGANIZED HEALTH CARE EDUCATION/TRAINING PROGRAM

## 2022-12-19 PROCEDURE — G8754 DIAS BP LESS 90: HCPCS | Performed by: STUDENT IN AN ORGANIZED HEALTH CARE EDUCATION/TRAINING PROGRAM

## 2022-12-19 PROCEDURE — 1090F PRES/ABSN URINE INCON ASSESS: CPT | Performed by: STUDENT IN AN ORGANIZED HEALTH CARE EDUCATION/TRAINING PROGRAM

## 2022-12-19 PROCEDURE — 81003 URINALYSIS AUTO W/O SCOPE: CPT | Performed by: STUDENT IN AN ORGANIZED HEALTH CARE EDUCATION/TRAINING PROGRAM

## 2022-12-19 PROCEDURE — G8417 CALC BMI ABV UP PARAM F/U: HCPCS | Performed by: STUDENT IN AN ORGANIZED HEALTH CARE EDUCATION/TRAINING PROGRAM

## 2022-12-19 PROCEDURE — 93000 ELECTROCARDIOGRAM COMPLETE: CPT | Performed by: STUDENT IN AN ORGANIZED HEALTH CARE EDUCATION/TRAINING PROGRAM

## 2022-12-19 PROCEDURE — G8427 DOCREV CUR MEDS BY ELIG CLIN: HCPCS | Performed by: STUDENT IN AN ORGANIZED HEALTH CARE EDUCATION/TRAINING PROGRAM

## 2022-12-19 PROCEDURE — 99212 OFFICE O/P EST SF 10 MIN: CPT | Performed by: STUDENT IN AN ORGANIZED HEALTH CARE EDUCATION/TRAINING PROGRAM

## 2022-12-19 PROCEDURE — G8536 NO DOC ELDER MAL SCRN: HCPCS | Performed by: STUDENT IN AN ORGANIZED HEALTH CARE EDUCATION/TRAINING PROGRAM

## 2022-12-19 PROCEDURE — G8752 SYS BP LESS 140: HCPCS | Performed by: STUDENT IN AN ORGANIZED HEALTH CARE EDUCATION/TRAINING PROGRAM

## 2022-12-19 PROCEDURE — 93005 ELECTROCARDIOGRAM TRACING: CPT | Performed by: STUDENT IN AN ORGANIZED HEALTH CARE EDUCATION/TRAINING PROGRAM

## 2022-12-19 PROCEDURE — 1101F PT FALLS ASSESS-DOCD LE1/YR: CPT | Performed by: STUDENT IN AN ORGANIZED HEALTH CARE EDUCATION/TRAINING PROGRAM

## 2022-12-19 NOTE — PROGRESS NOTES
Subjective: Pre-op visit for plastic surgery for skin underneath eyes    Debating surgery  Very unsure  After EKG decided against surgery    Objective  Visit Vitals  /79 (BP 1 Location: Right upper arm, BP Patient Position: Sitting)   Pulse 96   Temp 97.5 °F (36.4 °C) (Oral)   Resp 16   Ht 5' 0.5\" (1.537 m)   Wt 144 lb (65.3 kg)   SpO2 94%   BMI 27.66 kg/m²     General: No acute distress. HEENT: Conjunctiva are clear, sclera non-icteric. Respiratory: Normal work of breathing, talking in full sentences without issue  Musculoskeletal: Normal gait. Skin: No abnormalities or rashes   Neuro: Alert, oriented, speech clear and coherent  Psychiatric: Normal mood and affect      Assess/Plan  Diagnoses and all orders for this visit:    1. Pre-op examination: Cancelled tests below due to pt decision to cancel surgery. Noted that HA1c, PTT and PT/INR are not covered by Medicare and patient doesn't have diagnosis under which these would be covered. -     CBC W/O DIFF; Future  -     AMB POC EKG ROUTINE W/ 12 LEADS, INTER & REP  -     PROTHROMBIN TIME + INR; Future  -     PTT; Future  -     METABOLIC PANEL, COMPREHENSIVE; Future  -     HEMOGLOBIN A1C WITH EAG;  Future  -     AMB POC URINALYSIS DIP STICK AUTO W/O MICRO

## 2022-12-19 NOTE — PROGRESS NOTES
Normal sinus rhythm, normal intervals.  No significant change from 2021 at [facility]

## 2022-12-19 NOTE — PROGRESS NOTES
Hector Watson is a 75 y.o. female    Chief Complaint   Patient presents with    Pre-op Exam     Patient is having surgery under 01/18/2022. Patient is getting under eyes done. No other concerns. 1. Have you been to the ER, urgent care clinic since your last visit? Hospitalized since your last visit? No    2. Have you seen or consulted any other health care providers outside of the [address] [place] [address] since your last visit? Include any pap smears or colon screening. No      Visit Vitals  /79 (BP 1 Location: Right upper arm, BP Patient Position: Sitting)   Pulse 96   Temp 97.5 °F (36.4 °C) (Oral)   Resp 16   Ht 5' 0.5\" (1.537 m)   Wt 144 lb (65.3 kg)   SpO2 94%   BMI 27.66 kg/m²           Health Maintenance Due   Topic Date Due    Colorectal Cancer Screening Combo  Never done    Breast Cancer Screen Mammogram  Never done    Lipid Screen  12/03/2022    Medicare Yearly Exam  12/04/2022         Medication Reconciliation completed, changes noted.   Please  Update medication list.

## 2023-01-03 RX ORDER — ATORVASTATIN CALCIUM 40 MG/1
TABLET, FILM COATED ORAL
Qty: 30 TABLET | Refills: 2 | Status: SHIPPED | OUTPATIENT
Start: 2023-01-03

## 2023-01-06 LAB
ALBUMIN SERPL-MCNC: 4.5 G/DL (ref 3.8–4.8)
ALBUMIN/GLOB SERPL: 1.7 {RATIO} (ref 1.2–2.2)
ALP SERPL-CCNC: 59 IU/L (ref 44–121)
ALT SERPL-CCNC: 21 IU/L (ref 0–32)
AST SERPL-CCNC: 29 IU/L (ref 0–40)
BASOPHILS # BLD AUTO: 0.1 X10E3/UL (ref 0–0.2)
BASOPHILS NFR BLD AUTO: 1 %
BILIRUB SERPL-MCNC: 0.5 MG/DL (ref 0–1.2)
BUN SERPL-MCNC: 13 MG/DL (ref 8–27)
BUN/CREAT SERPL: 18 (ref 12–28)
CALCIUM SERPL-MCNC: 10.8 MG/DL (ref 8.7–10.3)
CHLORIDE SERPL-SCNC: 99 MMOL/L (ref 96–106)
CO2 SERPL-SCNC: 24 MMOL/L (ref 20–29)
CREAT SERPL-MCNC: 0.74 MG/DL (ref 0.57–1)
CRP SERPL-MCNC: 8 MG/L (ref 0–10)
EGFR: 88 ML/MIN/1.73
EOSINOPHIL # BLD AUTO: 0.4 X10E3/UL (ref 0–0.4)
EOSINOPHIL NFR BLD AUTO: 4 %
ERYTHROCYTE [DISTWIDTH] IN BLOOD BY AUTOMATED COUNT: 11.2 % (ref 11.7–15.4)
ERYTHROCYTE [SEDIMENTATION RATE] IN BLOOD BY WESTERGREN METHOD: 69 MM/HR (ref 0–40)
GLOBULIN SER CALC-MCNC: 2.7 G/DL (ref 1.5–4.5)
GLUCOSE SERPL-MCNC: 98 MG/DL (ref 70–99)
HCT VFR BLD AUTO: 38.6 % (ref 34–46.6)
HGB BLD-MCNC: 12.5 G/DL (ref 11.1–15.9)
IMM GRANULOCYTES # BLD AUTO: 0 X10E3/UL (ref 0–0.1)
IMM GRANULOCYTES NFR BLD AUTO: 0 %
LYMPHOCYTES # BLD AUTO: 3 X10E3/UL (ref 0.7–3.1)
LYMPHOCYTES NFR BLD AUTO: 32 %
MCH RBC QN AUTO: 30.6 PG (ref 26.6–33)
MCHC RBC AUTO-ENTMCNC: 32.4 G/DL (ref 31.5–35.7)
MCV RBC AUTO: 95 FL (ref 79–97)
MONOCYTES # BLD AUTO: 0.7 X10E3/UL (ref 0.1–0.9)
MONOCYTES NFR BLD AUTO: 8 %
NEUTROPHILS # BLD AUTO: 5.3 X10E3/UL (ref 1.4–7)
NEUTROPHILS NFR BLD AUTO: 55 %
PLATELET # BLD AUTO: 371 X10E3/UL (ref 150–450)
POTASSIUM SERPL-SCNC: 3.8 MMOL/L (ref 3.5–5.2)
PROT SERPL-MCNC: 7.2 G/DL (ref 6–8.5)
RBC # BLD AUTO: 4.08 X10E6/UL (ref 3.77–5.28)
SODIUM SERPL-SCNC: 141 MMOL/L (ref 134–144)
WBC # BLD AUTO: 9.4 X10E3/UL (ref 3.4–10.8)

## 2023-02-06 DIAGNOSIS — M05.9 SEROPOSITIVE RHEUMATOID ARTHRITIS (HCC): ICD-10-CM

## 2023-02-07 RX ORDER — LEFLUNOMIDE 20 MG/1
TABLET ORAL
Qty: 90 TABLET | Refills: 0 | Status: SHIPPED | OUTPATIENT
Start: 2023-02-07

## 2023-02-13 ENCOUNTER — OFFICE VISIT (OUTPATIENT)
Dept: RHEUMATOLOGY | Age: 68
End: 2023-02-13
Payer: MEDICARE

## 2023-02-13 VITALS
WEIGHT: 142 LBS | OXYGEN SATURATION: 97 % | SYSTOLIC BLOOD PRESSURE: 138 MMHG | DIASTOLIC BLOOD PRESSURE: 86 MMHG | TEMPERATURE: 98 F | RESPIRATION RATE: 16 BRPM | BODY MASS INDEX: 27.28 KG/M2 | HEART RATE: 82 BPM

## 2023-02-13 DIAGNOSIS — M05.9 SEROPOSITIVE RHEUMATOID ARTHRITIS (HCC): ICD-10-CM

## 2023-02-13 DIAGNOSIS — M81.8 OTHER OSTEOPOROSIS, UNSPECIFIED PATHOLOGICAL FRACTURE PRESENCE: Primary | ICD-10-CM

## 2023-02-13 DIAGNOSIS — Z79.899 ONGOING USE OF POSSIBLY TOXIC MEDICATION: ICD-10-CM

## 2023-02-13 PROCEDURE — G0463 HOSPITAL OUTPT CLINIC VISIT: HCPCS | Performed by: INTERNAL MEDICINE

## 2023-02-13 PROCEDURE — 1090F PRES/ABSN URINE INCON ASSESS: CPT | Performed by: INTERNAL MEDICINE

## 2023-02-13 PROCEDURE — 1101F PT FALLS ASSESS-DOCD LE1/YR: CPT | Performed by: INTERNAL MEDICINE

## 2023-02-13 PROCEDURE — G8510 SCR DEP NEG, NO PLAN REQD: HCPCS | Performed by: INTERNAL MEDICINE

## 2023-02-13 PROCEDURE — 3074F SYST BP LT 130 MM HG: CPT | Performed by: INTERNAL MEDICINE

## 2023-02-13 PROCEDURE — G8536 NO DOC ELDER MAL SCRN: HCPCS | Performed by: INTERNAL MEDICINE

## 2023-02-13 PROCEDURE — G9899 SCRN MAM PERF RSLTS DOC: HCPCS | Performed by: INTERNAL MEDICINE

## 2023-02-13 PROCEDURE — G8417 CALC BMI ABV UP PARAM F/U: HCPCS | Performed by: INTERNAL MEDICINE

## 2023-02-13 PROCEDURE — 3017F COLORECTAL CA SCREEN DOC REV: CPT | Performed by: INTERNAL MEDICINE

## 2023-02-13 PROCEDURE — 3078F DIAST BP <80 MM HG: CPT | Performed by: INTERNAL MEDICINE

## 2023-02-13 PROCEDURE — 99214 OFFICE O/P EST MOD 30 MIN: CPT | Performed by: INTERNAL MEDICINE

## 2023-02-13 PROCEDURE — G8427 DOCREV CUR MEDS BY ELIG CLIN: HCPCS | Performed by: INTERNAL MEDICINE

## 2023-02-13 PROCEDURE — 1123F ACP DISCUSS/DSCN MKR DOCD: CPT | Performed by: INTERNAL MEDICINE

## 2023-02-13 NOTE — PROGRESS NOTES
Chief Complaint   Patient presents with    Joint Pain     1. Have you been to the ER, urgent care clinic since your last visit? Hospitalized since your last visit? No     2. Have you seen or consulted any other health care providers outside of the [address] [place] [address] since your last visit? Include any pap smears or colon screening.  No

## 2023-02-13 NOTE — PATIENT INSTRUCTIONS
1) Continue to get Prolia injections once every 6 months. Your next one is scheduled for May 18.     2) Continue to take one pill of Leflunomide daily. 3) Call Dr Silke Haas to schedule a follow up appointment and to get more Breo. 4) I am ordering a bone density scan for you to get done at your earliest convenience. Schedule this at the same place that you had your last one done. 5) Check labs in 3 months. 6) Follow up in 5 months. Let me know if you have any questions or concerns in the meantime.

## 2023-02-13 NOTE — PROGRESS NOTES
REASON FOR VISIT    Patient presents for a follow up visit for seropositive RA and osteoporosis. HISTORY OF DISEASE: Seropositive Rheumatoid Arthritis; erosive    Year of diagnosis: 2013  First visit with this clinic:  3/2019  Cumulative disease manifestations:   Positive serologies/labs: RF, CCP  Negative serologies/labs:   Extra-articular comorbidities: Pulmonary fibrosis   Other co-morbidities:  Osteoporosis, low-grade hyperCa  Relapses: 12/2018 but on not treatment     Past treatment history:  Prednisone:   Non-biologic DMARD: Methotrexate 20 mg oral weekly (2013-1/2020; d/blake due to hair loss) Leflunomide 20 mg oral daily (1/2020-present)  Biologic:  Other: Prolia 11/2019-  Broke wrist in 2006, never felt it healed entirely, went for Xrays and told she had arthritis. Started methotrexate but alopecia. Changed to leflunomide with better tolerability and joint control. HISTORY OF PRESENT ILLNESS     Pt returns for a follow up. Last visit 11/17/2022. She feels OK today. Pt's joints have been doing well since last visit. She still takes one pill of Leflunomide daily with good tolerance. She does not take tessalon pearls anymore because she no longer has a cough. Sje still takes a Prolia shot once every 6 months with good tolerance. She has not seen her Pulmonologist recently, but she has not noticed any changes in her breathing. She walks 1.5-2 hours daily. The only lung medication she takes is Breo. She denies rashes, skin nodules, eye pain, constipation, headaches. She has constant eye redness, but she recently saw the eye doctor who did not see inflammation. __  Tolerating leflunomide well. No interval infections. Calcium remains high. Has stopped Caltrate but still takes hydrochlorthiazide. Denies headaches or constipation.  May calcitriol and iPTH levels had been appropriately low-normal.      REVIEW OF SYSTEMS    A comprehensive review of systems was performed and pertinent results are documented in the HPI, review of systems is otherwise non-contributory. PAST MEDICAL HISTORY    Past Medical History:   Diagnosis Date    Age-related osteoporosis without current pathological fracture 6/17/2021    Osteoporosis     Rheumatoid arthritis ([address]         No past surgical history on file. FAMILY HISTORY    Family History   Problem Relation Age of Onset    No Known Problems Mother     No Known Problems Father        SOCIAL HISTORY    Social History     Tobacco Use    Smoking status: Former    Smokeless tobacco: Never    Tobacco comments:     Quit 3 Years Ago   Vaping Use    Vaping Use: Never used   Substance Use Topics    Alcohol use: Yes     Comment: Social    Drug use: No       MEDICATIONS    Current Outpatient Medications   Medication Sig Dispense Refill    leflunomide (ARAVA) 20 mg tablet TAKE 1 TABLET DAILY 90 Tablet 0    atorvastatin (LIPITOR) 40 mg tablet TAKE 1 TABLET DAILY 30 Tablet 2    Breo Ellipta 200-25 mcg/dose inhaler       diclofenac (VOLTAREN) 1 % gel APPLY 4 GRAMS TOPICALLY TO THE AFFECTED AREA FOUR TIMES DAILY AS NEEDED FOR PAIN      montelukast (SINGULAIR) 10 mg tablet       benzonatate (TESSALON) 200 mg capsule TAKE 1 CAPSULE BY MOUTH THREE TIMES DAILY AS NEEDED (Patient not taking: Reported on 12/19/2022)      hydroCHLOROthiazide (HYDRODIURIL) 25 mg tablet Take 1 Tablet by mouth daily. PLEASE SCHEDULE FOLLOW UP VISIT WITH PCP PRIOR TO NEXT REFILL. 90 Tablet 1    denosumab (PROLIA) 60 mg/mL injection 60 mg by SubCUTAneous route once. Indications: patient gets this injection every 6 months         ALLERGIES    No Known Allergies    PHYSICAL EXAMINATION  There were no vitals taken for this visit. General:  The patient is well developed, well nourished, alert, and in no apparent distress. Eyes: Sclera are anicteric. No conjunctival injection. HEENT:  Oropharynx is clear. No oral ulcers. Adequate salivary pooling. No cervical or supraclavicular lymphadenopathy.    Lungs:  Clear to auscultation bilaterally, without wheeze or stridor. Normal respiratory effort. Cor:  Regular rate and rhythm. No murmur rub or gallop. Abdomen: Soft, non-tender, without hepatomegaly or masses. Extremities: No calf tenderness or edema. Warm and well perfused. Skin:  No significant abnormalities. Neuro: Nonfocal  Musculoskeletal:    A comprehensive musculoskeletal exam was performed for all joints of each upper and lower extremity and assessed for swelling, tenderness and range of motion. Results are documented as below:  No evidence of synovitis in the small joints of the hands, wrists, shoulders, elbows, hips, knees or ankles. __  General:  The patient is well developed, well nourished, alert, and in no apparent distress. Eyes: Sclera are anicteric. No conjunctival injection. HEENT:  Oropharynx is clear. No oral ulcers. Adequate salivary pooling. No cervical or supraclavicular lymphadenopathy. Lungs: No interval return of basilar crackles, no wheeze or stridor. Normal respiratory effort. Cor:  Regular rate and rhythm. No murmurs rubs or gallops. Abdomen: Soft, non-tender, without hepatomegaly or masses. Extremities: No calf tenderness or edema. Warm and well perfused. Skin:  No significant abnormalities. No petechial, purpuric, or psoriaform rashes   Neuro: Nonfocal  Musculoskeletal:    A comprehensive musculoskeletal exam was performed for all joints of each upper and lower extremity and assessed for swelling, tenderness and range of motion. Results are documented as below:  Limited flexion/extension right > left bilateral wrists 2/2 damage, no active left wrist or left 1st MCP synovitis. Still resolved ankle synovitis, hamertoes without MTP squeeze tenderness.        DATA REVIEW    Prior medical records were reviewed and if applicable are summarized as below:    Labs:     1/5/23: CRP 8 mg/L, ESR 69, Cr 0.74, Ca 10.8, LFT WNL, CBC WNL  11/7/22: ESR 40, Cr 0..66, Ca 11.0, LFT WNL, CBC WNL, CRP 9 mg/L  8/15/22: ESR 52, Cr 0.8, LFT WNL,  CBC WNL, CRP 14 mg/L  5/16/22: Cr 0.80, Ca 11.2  2/18/22: WBC 10.1, Hgb 13.3, Plt 403; ESR 52; Cr 0.85, Ca 11.1, LFTs WNL , CRP 4mg/L, Vit D 53  11/12/21: WBC 8.6, Hgb 12.6, Plt 335; ESR 12, Cr 0.81, Ca 10.8, LFTs WNL  9/14/21:   6/9/21: ESR 75, LFTs WNL, CRP 9mg/L  5/10/21: Cr 0.96, Ca 10.3, no LFTs or CBC.  11/2020: Vit D 45; TSH WNL, Ca 10.7->10.0, BMP WNL  7/2020:  CBC WNL, Ca 10.9, Cr 0.84, CMP WNL; Hep B SAg neg, SAb neg, IgM cAb+, total cAb neg, eAg neg; QFN neg  1/2020: Cbc, cmp unremarkable  10/2019: cbc with thrombocytosis, elevated PMNs, lymph, monocytes, CMP normal  7/2019: Cbc, cmp unremarkable  5/2019: BMP normal  3/2019: CCP, RF positive, ESR, CRP normal, Hep B, C, quant gold negative  2/2019: cbc, CMP unremarkable    Imaging:     CHEST CT (2/17/22): No significant interval change in honeycombing, fibrosis, and bronchiectasis. CT chest (2/6/21):    1. The lungs demonstrate persistent abnormality bilaterally this is most pronounced in the periphery of the upper lobes anteriorly does involve all lobes. Findings are consistent with peripheral areas of fibrosis and  honeycombing. The pattern is similar. There is a fibrosis appears somewhat better defined in some of the honeycomb cysts are slightly larger. This is only  minimal change. 2. Overall the patient effected a better degree of inspiration on today's examination mild areas of bronchiectasis are noted in the lower lobes. This is better seen on this study than on the previous exam and is mild but may have progressed slightly. 3. No adenopathy CT chest (11/11/19): 1. There are peripheral reticular opacities with honeycombing. This is most pronounced anteriorly in each upper lobe left greater than right also involves the lower lobes. These findings are consistent with pulmonary fibrosis. Pattern  is not typical for UIP. 2. Adenopathy as described above. This could be reactive.  Exact etiology is  uncertain. Follow-up exam in 3-6 months may yield more information. Hand X-rays (3/2019): Personally reviewed images. + erosions consistent with RA and severe OA  Foot x-rays (3/2019): Personally reviewed images. + erosions  DEXA (3/2019): T score -2.6 at the left radius    ASSESSMENT AND PLAN    A 75 y.o. female with hx of osteoporosis, seropositive erosive rheumatoid arthritis on leflunomide 20 mg oral daily presents for a follow up visit with inflammatory arthritis still in clinical remission. Low grade hypercalcemia is asymptomatic and presumably still related to hydrochlorthiazide use; given minimal symptoms, not critical to replace hydrochlorthiazide. No changes to immunosuppression today. # Seropositive erosive Rheumatoid arthritis:  - continue leflunomide 20 mg oral daily. 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 Jennifer Lee with ~annual followup due around March 2022, no symptomatic progression    # Osteoporosis:  - s/p Prolia since 4/2020, received uneventfully today. Due next 5/2023  - Holding off on repeat DXA as patient not currently interested in more anabolic treatment alternatives  -Normal vitamin D levels 2/2022, no changes    #Hypercalcemia  -Defer to PCP re: adjustments to hydrochlorthiazide if desired    # Medication Toxicity Monitoring:  - cbc, cmp q3mo  - Hepatitis B, C: negative 3/2019--false positive Hep B cAb  - Quant gold: negative 7/20  - bone health: see above    RTC in 6 months    There are no Patient Instructions on file for this visit. The patient voiced understanding of the aforementioned assessment and plan. Summary of plan was provided in the After Visit Summary patient instructions. I also provided education about [employer] setup and utility. Ms. Neptali Wilhelm has a reminder for a \"due or due soon\" health maintenance. I have asked that she contact her primary care provider for follow-up on this health maintenance.     TODAY'S ORDERS    No orders of the defined types were placed in this encounter. Face to face time: 15 minutes  Note preparation and records review day of service: 20 minutes  Total provider time day of service: 35 minutes       Future Appointments   Date Time Provider Catherine Reynaga   2/13/2023  9:20 AM Yovana Brandt MD AOCR BS AMB   5/18/2023  9:00 AM INFUSIONINJ_RC AOCR BS AMB     This was scribed by Kevin Mcpherson in the presence of Dr. Tona Balderas. The note was reviewed and amended personally, and I agree with the above information.

## 2023-02-18 DIAGNOSIS — I10 BENIGN ESSENTIAL HTN: ICD-10-CM

## 2023-02-19 RX ORDER — HYDROCHLOROTHIAZIDE 25 MG/1
TABLET ORAL
Qty: 90 TABLET | Refills: 1 | Status: SHIPPED | OUTPATIENT
Start: 2023-02-19

## 2023-02-27 DIAGNOSIS — M05.9 SEROPOSITIVE RHEUMATOID ARTHRITIS (HCC): ICD-10-CM

## 2023-02-27 RX ORDER — ATORVASTATIN CALCIUM 40 MG/1
TABLET, FILM COATED ORAL
Qty: 30 TABLET | Refills: 2 | Status: SHIPPED | OUTPATIENT
Start: 2023-02-27

## 2023-02-28 RX ORDER — LEFLUNOMIDE 20 MG/1
TABLET ORAL
Qty: 90 TABLET | Refills: 0 | Status: SHIPPED | OUTPATIENT
Start: 2023-02-28

## 2023-03-28 ENCOUNTER — HOSPITAL ENCOUNTER (OUTPATIENT)
Dept: MAMMOGRAPHY | Age: 68
Discharge: HOME | End: 2023-03-28
Attending: INTERNAL MEDICINE
Payer: MEDICARE

## 2023-03-28 DIAGNOSIS — M81.8 OTHER OSTEOPOROSIS, UNSPECIFIED PATHOLOGICAL FRACTURE PRESENCE: ICD-10-CM

## 2023-03-28 DIAGNOSIS — M05.9 SEROPOSITIVE RHEUMATOID ARTHRITIS (HCC): ICD-10-CM

## 2023-03-28 PROCEDURE — 77080 DXA BONE DENSITY AXIAL: CPT

## 2023-03-30 ENCOUNTER — TRANSCRIBE ORDER (OUTPATIENT)
Dept: SCHEDULING | Age: 68
End: 2023-03-30

## 2023-03-30 DIAGNOSIS — J84.10 PULMONARY FIBROSIS (HCC): Primary | ICD-10-CM

## 2023-04-03 ENCOUNTER — HOSPITAL ENCOUNTER (OUTPATIENT)
Dept: CT IMAGING | Age: 68
End: 2023-04-03
Attending: PHYSICIAN ASSISTANT
Payer: MEDICARE

## 2023-04-03 DIAGNOSIS — J84.10 PULMONARY FIBROSIS (HCC): ICD-10-CM

## 2023-04-03 PROCEDURE — 71250 CT THORAX DX C-: CPT

## 2023-04-05 ENCOUNTER — TRANSCRIBE ORDER (OUTPATIENT)
Dept: SCHEDULING | Age: 68
End: 2023-04-05

## 2023-04-06 ENCOUNTER — OFFICE VISIT (OUTPATIENT)
Dept: RHEUMATOLOGY | Age: 68
End: 2023-04-06
Payer: MEDICARE

## 2023-04-06 VITALS
HEIGHT: 61 IN | WEIGHT: 140 LBS | SYSTOLIC BLOOD PRESSURE: 132 MMHG | BODY MASS INDEX: 26.43 KG/M2 | RESPIRATION RATE: 16 BRPM | TEMPERATURE: 99 F | DIASTOLIC BLOOD PRESSURE: 85 MMHG | HEART RATE: 80 BPM | OXYGEN SATURATION: 97 %

## 2023-04-06 DIAGNOSIS — M05.9 SEROPOSITIVE RHEUMATOID ARTHRITIS (HCC): ICD-10-CM

## 2023-04-06 DIAGNOSIS — M81.0 AGE-RELATED OSTEOPOROSIS WITHOUT CURRENT PATHOLOGICAL FRACTURE: Primary | ICD-10-CM

## 2023-04-06 PROCEDURE — G8432 DEP SCR NOT DOC, RNG: HCPCS | Performed by: INTERNAL MEDICINE

## 2023-04-06 PROCEDURE — 3074F SYST BP LT 130 MM HG: CPT | Performed by: INTERNAL MEDICINE

## 2023-04-06 PROCEDURE — 1123F ACP DISCUSS/DSCN MKR DOCD: CPT | Performed by: INTERNAL MEDICINE

## 2023-04-06 PROCEDURE — 3017F COLORECTAL CA SCREEN DOC REV: CPT | Performed by: INTERNAL MEDICINE

## 2023-04-06 PROCEDURE — G8417 CALC BMI ABV UP PARAM F/U: HCPCS | Performed by: INTERNAL MEDICINE

## 2023-04-06 PROCEDURE — G0463 HOSPITAL OUTPT CLINIC VISIT: HCPCS | Performed by: INTERNAL MEDICINE

## 2023-04-06 PROCEDURE — G8536 NO DOC ELDER MAL SCRN: HCPCS | Performed by: INTERNAL MEDICINE

## 2023-04-06 PROCEDURE — 3078F DIAST BP <80 MM HG: CPT | Performed by: INTERNAL MEDICINE

## 2023-04-06 PROCEDURE — G8427 DOCREV CUR MEDS BY ELIG CLIN: HCPCS | Performed by: INTERNAL MEDICINE

## 2023-04-06 PROCEDURE — 1090F PRES/ABSN URINE INCON ASSESS: CPT | Performed by: INTERNAL MEDICINE

## 2023-04-06 PROCEDURE — G9899 SCRN MAM PERF RSLTS DOC: HCPCS | Performed by: INTERNAL MEDICINE

## 2023-04-06 PROCEDURE — 99215 OFFICE O/P EST HI 40 MIN: CPT | Performed by: INTERNAL MEDICINE

## 2023-04-06 PROCEDURE — 1101F PT FALLS ASSESS-DOCD LE1/YR: CPT | Performed by: INTERNAL MEDICINE

## 2023-04-06 RX ORDER — HYDROCORTISONE SODIUM SUCCINATE 100 MG/2ML
100 INJECTION, POWDER, FOR SOLUTION INTRAMUSCULAR; INTRAVENOUS AS NEEDED
OUTPATIENT
Start: 2023-04-30

## 2023-04-06 RX ORDER — ONDANSETRON 2 MG/ML
8 INJECTION INTRAMUSCULAR; INTRAVENOUS AS NEEDED
OUTPATIENT
Start: 2023-04-30

## 2023-04-06 RX ORDER — DIPHENHYDRAMINE HYDROCHLORIDE 50 MG/ML
25 INJECTION, SOLUTION INTRAMUSCULAR; INTRAVENOUS AS NEEDED
Start: 2023-04-30

## 2023-04-06 RX ORDER — ACETAMINOPHEN 325 MG/1
650 TABLET ORAL AS NEEDED
Start: 2023-04-30

## 2023-04-06 RX ORDER — DIPHENHYDRAMINE HYDROCHLORIDE 50 MG/ML
50 INJECTION, SOLUTION INTRAMUSCULAR; INTRAVENOUS AS NEEDED
Start: 2023-04-30

## 2023-04-06 RX ORDER — EPINEPHRINE 1 MG/ML
0.3 INJECTION, SOLUTION, CONCENTRATE INTRAVENOUS AS NEEDED
OUTPATIENT
Start: 2023-04-30

## 2023-04-06 RX ORDER — ALBUTEROL SULFATE 0.83 MG/ML
2.5 SOLUTION RESPIRATORY (INHALATION) AS NEEDED
Start: 2023-04-30

## 2023-04-18 ENCOUNTER — HOSPITAL ENCOUNTER (OUTPATIENT)
Dept: NON INVASIVE DIAGNOSTICS | Age: 68
Discharge: HOME | End: 2023-04-18
Attending: INTERNAL MEDICINE
Payer: MEDICARE

## 2023-04-18 VITALS
SYSTOLIC BLOOD PRESSURE: 142 MMHG | HEIGHT: 60 IN | BODY MASS INDEX: 27.48 KG/M2 | WEIGHT: 139.99 LBS | DIASTOLIC BLOOD PRESSURE: 87 MMHG

## 2023-04-18 DIAGNOSIS — I35.9 AORTIC VALVE CALCIFICATION: ICD-10-CM

## 2023-04-18 LAB
ECHO AO ARCH DIAM: 1.7 CM
ECHO AO ASC DIAM: 3.5 CM
ECHO AO ASCENDING AORTA INDEX: 2.19 CM/M2
ECHO AV AREA PEAK VELOCITY: 2.7 CM2
ECHO AV AREA/BSA PEAK VELOCITY: 1.7 CM2/M2
ECHO AV PEAK GRADIENT: 14 MMHG
ECHO AV PEAK VELOCITY: 1.8 M/S
ECHO AV VELOCITY RATIO: 0.78
ECHO LA DIAMETER INDEX: 1.56 CM/M2
ECHO LA DIAMETER: 2.5 CM
ECHO LA VOL 2C: 28 ML (ref 22–52)
ECHO LA VOL 4C: 20 ML (ref 22–52)
ECHO LA VOL BP: 24 ML (ref 22–52)
ECHO LA VOL/BSA BIPLANE: 15 ML/M2 (ref 16–34)
ECHO LA VOLUME AREA LENGTH: 27 ML
ECHO LA VOLUME INDEX A2C: 18 ML/M2 (ref 16–34)
ECHO LA VOLUME INDEX A4C: 13 ML/M2 (ref 16–34)
ECHO LA VOLUME INDEX AREA LENGTH: 17 ML/M2 (ref 16–34)
ECHO LV E' LATERAL VELOCITY: 8 CM/S
ECHO LV E' SEPTAL VELOCITY: 7 CM/S
ECHO LV FRACTIONAL SHORTENING: 33 % (ref 28–44)
ECHO LV INTERNAL DIMENSION DIASTOLE INDEX: 2.44 CM/M2
ECHO LV INTERNAL DIMENSION DIASTOLIC: 3.9 CM (ref 3.9–5.3)
ECHO LV INTERNAL DIMENSION SYSTOLIC INDEX: 1.63 CM/M2
ECHO LV INTERNAL DIMENSION SYSTOLIC: 2.6 CM
ECHO LV IVSD: 0.8 CM (ref 0.6–0.9)
ECHO LV MASS 2D: 89.7 G (ref 67–162)
ECHO LV MASS INDEX 2D: 56 G/M2 (ref 43–95)
ECHO LV POSTERIOR WALL DIASTOLIC: 0.8 CM (ref 0.6–0.9)
ECHO LV RELATIVE WALL THICKNESS RATIO: 0.41
ECHO LVOT AREA: 3.5 CM2
ECHO LVOT DIAM: 2.1 CM
ECHO LVOT MEAN GRADIENT: 4 MMHG
ECHO LVOT PEAK GRADIENT: 8 MMHG
ECHO LVOT PEAK VELOCITY: 1.4 M/S
ECHO LVOT STROKE VOLUME INDEX: 64 ML/M2
ECHO LVOT SV: 102.5 ML
ECHO LVOT VTI: 29.6 CM
ECHO MV A VELOCITY: 0.86 M/S
ECHO MV E DECELERATION TIME (DT): 219.9 MS
ECHO MV E VELOCITY: 0.61 M/S
ECHO MV E/A RATIO: 0.71
ECHO MV E/E' LATERAL: 7.63
ECHO MV E/E' RATIO (AVERAGED): 8.17
ECHO MV E/E' SEPTAL: 8.71
ECHO PV MAX VELOCITY: 1 M/S
ECHO PV PEAK GRADIENT: 4 MMHG
ECHO RV INTERNAL DIMENSION: 2.5 CM
ECHO RV TAPSE: 1.7 CM (ref 1.7–?)
ECHO TV REGURGITANT MAX VELOCITY: 2.62 M/S
ECHO TV REGURGITANT PEAK GRADIENT: 28 MMHG

## 2023-04-18 PROCEDURE — 93306 TTE W/DOPPLER COMPLETE: CPT | Performed by: STUDENT IN AN ORGANIZED HEALTH CARE EDUCATION/TRAINING PROGRAM

## 2023-04-18 PROCEDURE — 93306 TTE W/DOPPLER COMPLETE: CPT

## 2023-04-22 DIAGNOSIS — M81.0 AGE-RELATED OSTEOPOROSIS WITHOUT CURRENT PATHOLOGICAL FRACTURE: Primary | ICD-10-CM

## 2023-04-22 DIAGNOSIS — M81.8 OTHER OSTEOPOROSIS, UNSPECIFIED PATHOLOGICAL FRACTURE PRESENCE: Primary | ICD-10-CM

## 2023-04-22 DIAGNOSIS — M05.9 SEROPOSITIVE RHEUMATOID ARTHRITIS (HCC): ICD-10-CM

## 2023-04-23 DIAGNOSIS — M81.8 OTHER OSTEOPOROSIS, UNSPECIFIED PATHOLOGICAL FRACTURE PRESENCE: Primary | ICD-10-CM

## 2023-04-23 DIAGNOSIS — M05.9 SEROPOSITIVE RHEUMATOID ARTHRITIS (HCC): ICD-10-CM

## 2023-04-24 DIAGNOSIS — M81.8 OTHER OSTEOPOROSIS, UNSPECIFIED PATHOLOGICAL FRACTURE PRESENCE: Primary | ICD-10-CM

## 2023-04-24 DIAGNOSIS — Z79.899 ONGOING USE OF POSSIBLY TOXIC MEDICATION: ICD-10-CM

## 2023-04-24 DIAGNOSIS — M05.9 SEROPOSITIVE RHEUMATOID ARTHRITIS (HCC): ICD-10-CM

## 2023-04-28 DIAGNOSIS — M81.8 OTHER OSTEOPOROSIS, UNSPECIFIED PATHOLOGICAL FRACTURE PRESENCE: Primary | ICD-10-CM

## 2023-04-28 DIAGNOSIS — M05.9 SEROPOSITIVE RHEUMATOID ARTHRITIS (HCC): ICD-10-CM

## 2023-04-28 DIAGNOSIS — Z79.899 ONGOING USE OF POSSIBLY TOXIC MEDICATION: ICD-10-CM

## 2023-05-07 DIAGNOSIS — M81.0 AGE-RELATED OSTEOPOROSIS WITHOUT CURRENT PATHOLOGICAL FRACTURE: Primary | ICD-10-CM

## 2023-05-07 RX ORDER — ALBUTEROL SULFATE 90 UG/1
4 AEROSOL, METERED RESPIRATORY (INHALATION) PRN
OUTPATIENT
Start: 2023-05-14

## 2023-05-07 RX ORDER — SODIUM CHLORIDE 9 MG/ML
INJECTION, SOLUTION INTRAVENOUS CONTINUOUS
OUTPATIENT
Start: 2023-05-14

## 2023-05-07 RX ORDER — DIPHENHYDRAMINE HYDROCHLORIDE 50 MG/ML
50 INJECTION INTRAMUSCULAR; INTRAVENOUS
OUTPATIENT
Start: 2023-05-14

## 2023-05-07 RX ORDER — EPINEPHRINE 1 MG/ML
0.3 INJECTION, SOLUTION, CONCENTRATE INTRAVENOUS PRN
OUTPATIENT
Start: 2023-05-14

## 2023-05-07 RX ORDER — ACETAMINOPHEN 325 MG/1
650 TABLET ORAL
OUTPATIENT
Start: 2023-05-14

## 2023-05-07 RX ORDER — ONDANSETRON 2 MG/ML
8 INJECTION INTRAMUSCULAR; INTRAVENOUS
OUTPATIENT
Start: 2023-05-14

## 2023-05-17 NOTE — PROGRESS NOTES
[facility] Rheumatology  OBIC Note    Date: May 18, 2023  Name: Marion Moon  MRN: [id number]       : 1955  Diagnosis: Osteoporosis (M81.0)   Treatment: Prolia   Referring Provider: Dr. Savannah Brown  Supervising Provider: Dr. Savannah Brown    Patient arrived to [facility] at 0900. Ms. Guerrero allergies reviewed and she agreed to receiving today's treatment. Denies recent dental surgery    There were no vitals filed for this visit. Recent labs results:  Lab Results   Component Value Date/Time     2023 01:23 PM    K 4.1 2023 01:23 PM     2023 01:23 PM    CO2 23 2023 01:23 PM    BUN 15 2023 01:23 PM    CREATININE 0.79 2023 01:23 PM    GLUCOSE 101 2023 01:23 PM    CALCIUM 10.9 2023 01:23 PM    LABGLOM 81 2023 01:23 PM      Lab Results   Component Value Date    WBC 9.4 2023    HGB 12.5 2023    HCT 38.6 2023    MCV 95 2023     2023     Medications given per providers orders:  Administrations This Visit       denosumab (PROLIA) SC injection 60 mg       Admin Date  2023 Action  Given Dose  60 mg Route  SubCUTAneous Administered By  Alexandra Aguirre RN                Prolia to right arm  [address] [id number]  Lot # [id number]  Exp 2025    Treatment tolerated without complaints or reactions. Patient discharged amulatory in stable condition at 1. Encounter routed to supervising provider for co-signing.      Future Appointments   Date Time Provider Danilo Mccullough   2023  8:00 AM Vicky Cochran MD AOCR BS AMB   2023  9:00 AM INFUSIONINJ_RC AOAILYN BS AMB     [facility] slip printed for pt to take for next treatment    Alexandra Aguirre RN

## 2023-05-18 ENCOUNTER — NURSE ONLY (OUTPATIENT)
Age: 68
End: 2023-05-18
Payer: MEDICARE

## 2023-05-18 VITALS
SYSTOLIC BLOOD PRESSURE: 135 MMHG | HEART RATE: 85 BPM | RESPIRATION RATE: 16 BRPM | OXYGEN SATURATION: 98 % | TEMPERATURE: 98 F | DIASTOLIC BLOOD PRESSURE: 89 MMHG

## 2023-05-18 DIAGNOSIS — M81.0 AGE-RELATED OSTEOPOROSIS WITHOUT CURRENT PATHOLOGICAL FRACTURE: Primary | ICD-10-CM

## 2023-05-18 PROCEDURE — 96372 THER/PROPH/DIAG INJ SC/IM: CPT | Performed by: INTERNAL MEDICINE

## 2023-05-18 RX ORDER — ACETAMINOPHEN 325 MG/1
650 TABLET ORAL
OUTPATIENT
Start: 2023-11-12

## 2023-05-18 RX ORDER — EPINEPHRINE 1 MG/ML
0.3 INJECTION, SOLUTION, CONCENTRATE INTRAVENOUS PRN
OUTPATIENT
Start: 2023-11-12

## 2023-05-18 RX ORDER — ATORVASTATIN CALCIUM 40 MG/1
TABLET, FILM COATED ORAL
COMMUNITY
Start: 2023-05-06

## 2023-05-18 RX ORDER — LEFLUNOMIDE 20 MG/1
TABLET ORAL
COMMUNITY
Start: 2023-04-16

## 2023-05-18 RX ORDER — DIPHENHYDRAMINE HYDROCHLORIDE 50 MG/ML
50 INJECTION INTRAMUSCULAR; INTRAVENOUS
OUTPATIENT
Start: 2023-11-12

## 2023-05-18 RX ORDER — SODIUM CHLORIDE 9 MG/ML
INJECTION, SOLUTION INTRAVENOUS CONTINUOUS
OUTPATIENT
Start: 2023-11-12

## 2023-05-18 RX ORDER — HYDROCHLOROTHIAZIDE 25 MG/1
TABLET ORAL
COMMUNITY
Start: 2023-05-02

## 2023-05-18 RX ORDER — MONTELUKAST SODIUM 10 MG/1
TABLET ORAL
COMMUNITY
Start: 2023-03-08

## 2023-05-18 RX ORDER — ALBUTEROL SULFATE 90 UG/1
4 AEROSOL, METERED RESPIRATORY (INHALATION) PRN
OUTPATIENT
Start: 2023-11-12

## 2023-05-18 RX ORDER — ONDANSETRON 2 MG/ML
8 INJECTION INTRAMUSCULAR; INTRAVENOUS
OUTPATIENT
Start: 2023-11-12

## 2023-05-18 RX ADMIN — DENOSUMAB 60 MG: 60 INJECTION SUBCUTANEOUS at 09:04

## 2023-05-18 ASSESSMENT — PATIENT HEALTH QUESTIONNAIRE - PHQ9
SUM OF ALL RESPONSES TO PHQ QUESTIONS 1-9: 0
2. FEELING DOWN, DEPRESSED OR HOPELESS: 0
SUM OF ALL RESPONSES TO PHQ QUESTIONS 1-9: 0
SUM OF ALL RESPONSES TO PHQ9 QUESTIONS 1 & 2: 0
1. LITTLE INTEREST OR PLEASURE IN DOING THINGS: 0

## 2023-05-18 ASSESSMENT — PAIN SCALES - GENERAL: PAINLEVEL_OUTOF10: 0

## 2023-05-31 RX ORDER — ATORVASTATIN CALCIUM 40 MG/1
TABLET, FILM COATED ORAL
Qty: 30 TABLET | Refills: 0 | Status: SHIPPED | OUTPATIENT
Start: 2023-05-31 | End: 2023-06-28

## 2023-06-05 ENCOUNTER — TELEPHONE (OUTPATIENT)
Age: 68
End: 2023-06-05

## 2023-06-05 NOTE — TELEPHONE ENCOUNTER
I was unable to reach the patient. I left a voice mail with a call back number for them to set up their next appointment. ----- Message from Jeffrey Burgess MD sent at 5/31/2023  2:32 PM EDT -----  Regarding: appt needed  Hi there,    Would you mind calling this pt to get her scheduled for  a f/up chronic condition appt? Thanks!   AK

## 2023-06-28 RX ORDER — ATORVASTATIN CALCIUM 40 MG/1
40 TABLET, FILM COATED ORAL DAILY
Qty: 30 TABLET | Refills: 0 | Status: SHIPPED | OUTPATIENT
Start: 2023-06-28 | End: 2023-07-25

## 2023-07-06 LAB
ALBUMIN SERPL-MCNC: 4.7 G/DL (ref 3.8–4.8)
ALBUMIN/GLOB SERPL: 1.8 {RATIO} (ref 1.2–2.2)
ALP SERPL-CCNC: 59 IU/L (ref 44–121)
ALT SERPL-CCNC: 20 IU/L (ref 0–32)
AST SERPL-CCNC: 30 IU/L (ref 0–40)
BASOPHILS # BLD AUTO: 0.1 X10E3/UL (ref 0–0.2)
BASOPHILS NFR BLD AUTO: 1 %
BILIRUB SERPL-MCNC: 0.5 MG/DL (ref 0–1.2)
BUN SERPL-MCNC: 13 MG/DL (ref 8–27)
BUN/CREAT SERPL: 19 (ref 12–28)
CALCIUM SERPL-MCNC: 10.8 MG/DL (ref 8.7–10.3)
CHLORIDE SERPL-SCNC: 102 MMOL/L (ref 96–106)
CO2 SERPL-SCNC: 26 MMOL/L (ref 20–29)
CREAT SERPL-MCNC: 0.67 MG/DL (ref 0.57–1)
CRP SERPL-MCNC: 1 MG/L (ref 0–10)
EGFRCR SERPLBLD CKD-EPI 2021: 95 ML/MIN/1.73
EOSINOPHIL # BLD AUTO: 0.3 X10E3/UL (ref 0–0.4)
EOSINOPHIL NFR BLD AUTO: 3 %
ERYTHROCYTE [DISTWIDTH] IN BLOOD BY AUTOMATED COUNT: 11.6 % (ref 11.7–15.4)
ERYTHROCYTE [SEDIMENTATION RATE] IN BLOOD BY WESTERGREN METHOD: 52 MM/HR (ref 0–40)
GLOBULIN SER CALC-MCNC: 2.6 G/DL (ref 1.5–4.5)
GLUCOSE SERPL-MCNC: 97 MG/DL (ref 70–99)
HCT VFR BLD AUTO: 39.8 % (ref 34–46.6)
HGB BLD-MCNC: 12.8 G/DL (ref 11.1–15.9)
IMM GRANULOCYTES # BLD AUTO: 0 X10E3/UL (ref 0–0.1)
IMM GRANULOCYTES NFR BLD AUTO: 0 %
LYMPHOCYTES # BLD AUTO: 3.2 X10E3/UL (ref 0.7–3.1)
LYMPHOCYTES NFR BLD AUTO: 29 %
MCH RBC QN AUTO: 30.5 PG (ref 26.6–33)
MCHC RBC AUTO-ENTMCNC: 32.2 G/DL (ref 31.5–35.7)
MCV RBC AUTO: 95 FL (ref 79–97)
MONOCYTES # BLD AUTO: 0.9 X10E3/UL (ref 0.1–0.9)
MONOCYTES NFR BLD AUTO: 8 %
NEUTROPHILS # BLD AUTO: 6.4 X10E3/UL (ref 1.4–7)
NEUTROPHILS NFR BLD AUTO: 59 %
PLATELET # BLD AUTO: 351 X10E3/UL (ref 150–450)
POTASSIUM SERPL-SCNC: 4.1 MMOL/L (ref 3.5–5.2)
PROT SERPL-MCNC: 7.3 G/DL (ref 6–8.5)
RBC # BLD AUTO: 4.2 X10E6/UL (ref 3.77–5.28)
SODIUM SERPL-SCNC: 143 MMOL/L (ref 134–144)
WBC # BLD AUTO: 10.9 X10E3/UL (ref 3.4–10.8)

## 2023-07-06 NOTE — TELEPHONE ENCOUNTER
Last visit 04/06/2023  Lab Results   Component Value Date     04/12/2023    K 4.1 04/12/2023     04/12/2023    CO2 23 04/12/2023    BUN 15 04/12/2023    CREATININE 0.79 04/12/2023    GLUCOSE 101 (H) 04/12/2023    CALCIUM 10.9 (H) 04/12/2023    PROT 7.2 01/05/2023    LABALBU 4.4 04/12/2023    BILITOT 0.5 01/05/2023    ALKPHOS 59 01/05/2023    AST 29 01/05/2023    ALT 21 01/05/2023    LABGLOM 81 04/12/2023    GFRAA 82 02/18/2022    AGRATIO 1.7 01/05/2023     Lab Results   Component Value Date    WBC 9.4 01/05/2023    HGB 12.5 01/05/2023    HCT 38.6 01/05/2023    MCV 95 01/05/2023     01/05/2023

## 2023-07-07 RX ORDER — LEFLUNOMIDE 20 MG/1
TABLET ORAL
Qty: 90 TABLET | Refills: 0 | Status: SHIPPED | OUTPATIENT
Start: 2023-07-07

## 2023-07-18 RX ORDER — HYDROCHLOROTHIAZIDE 25 MG/1
TABLET ORAL
Qty: 90 TABLET | Refills: 1 | Status: SHIPPED | OUTPATIENT
Start: 2023-07-18

## 2023-07-25 ENCOUNTER — OFFICE VISIT (OUTPATIENT)
Age: 68
End: 2023-07-25
Payer: MEDICARE

## 2023-07-25 VITALS
RESPIRATION RATE: 16 BRPM | SYSTOLIC BLOOD PRESSURE: 161 MMHG | OXYGEN SATURATION: 96 % | HEART RATE: 74 BPM | DIASTOLIC BLOOD PRESSURE: 86 MMHG | TEMPERATURE: 97.7 F | BODY MASS INDEX: 28.12 KG/M2 | WEIGHT: 144 LBS

## 2023-07-25 DIAGNOSIS — M05.9 RHEUMATOID ARTHRITIS WITH RHEUMATOID FACTOR, UNSPECIFIED (HCC): ICD-10-CM

## 2023-07-25 DIAGNOSIS — M81.0 AGE-RELATED OSTEOPOROSIS WITHOUT CURRENT PATHOLOGICAL FRACTURE: Primary | ICD-10-CM

## 2023-07-25 PROCEDURE — G8399 PT W/DXA RESULTS DOCUMENT: HCPCS | Performed by: PEDIATRICS

## 2023-07-25 PROCEDURE — 3017F COLORECTAL CA SCREEN DOC REV: CPT | Performed by: PEDIATRICS

## 2023-07-25 PROCEDURE — 1090F PRES/ABSN URINE INCON ASSESS: CPT | Performed by: PEDIATRICS

## 2023-07-25 PROCEDURE — G8427 DOCREV CUR MEDS BY ELIG CLIN: HCPCS | Performed by: PEDIATRICS

## 2023-07-25 PROCEDURE — 3079F DIAST BP 80-89 MM HG: CPT | Performed by: PEDIATRICS

## 2023-07-25 PROCEDURE — 99215 OFFICE O/P EST HI 40 MIN: CPT | Performed by: PEDIATRICS

## 2023-07-25 PROCEDURE — 1036F TOBACCO NON-USER: CPT | Performed by: PEDIATRICS

## 2023-07-25 PROCEDURE — 1123F ACP DISCUSS/DSCN MKR DOCD: CPT | Performed by: PEDIATRICS

## 2023-07-25 PROCEDURE — 3077F SYST BP >= 140 MM HG: CPT | Performed by: PEDIATRICS

## 2023-07-25 PROCEDURE — G8419 CALC BMI OUT NRM PARAM NOF/U: HCPCS | Performed by: PEDIATRICS

## 2023-07-25 RX ORDER — VITAMIN B COMPLEX
1000 TABLET ORAL DAILY
COMMUNITY

## 2023-07-25 RX ORDER — LEFLUNOMIDE 20 MG/1
20 TABLET ORAL DAILY
Qty: 90 TABLET | Refills: 1 | Status: SHIPPED | OUTPATIENT
Start: 2023-07-25

## 2023-07-25 RX ORDER — ATORVASTATIN CALCIUM 40 MG/1
TABLET, FILM COATED ORAL
Qty: 30 TABLET | Refills: 0 | Status: SHIPPED | OUTPATIENT
Start: 2023-07-25

## 2023-07-25 ASSESSMENT — PATIENT HEALTH QUESTIONNAIRE - PHQ9
1. LITTLE INTEREST OR PLEASURE IN DOING THINGS: 0
2. FEELING DOWN, DEPRESSED OR HOPELESS: 0
SUM OF ALL RESPONSES TO PHQ QUESTIONS 1-9: 0
SUM OF ALL RESPONSES TO PHQ9 QUESTIONS 1 & 2: 0
SUM OF ALL RESPONSES TO PHQ QUESTIONS 1-9: 0
1. LITTLE INTEREST OR PLEASURE IN DOING THINGS: 0
SUM OF ALL RESPONSES TO PHQ9 QUESTIONS 1 & 2: 0
SUM OF ALL RESPONSES TO PHQ QUESTIONS 1-9: 0
SUM OF ALL RESPONSES TO PHQ QUESTIONS 1-9: 0
2. FEELING DOWN, DEPRESSED OR HOPELESS: 0
SUM OF ALL RESPONSES TO PHQ QUESTIONS 1-9: 0

## 2023-07-25 NOTE — PROGRESS NOTES
Chief Complaint   Patient presents with    Joint Pain     1. Have you been to the ER, urgent care clinic since your last visit? Hospitalized since your last visit? No    2. Have you seen or consulted any other health care providers outside of the [address] [place] [address] since your last visit? Include any pap smears or colon screening.  No
(3/2019): T score -2.6 at the left radius     ASSESSMENT  1. Seropositive rheumatoid arthritis - The patient has been doing well on current regimen, however, recent imgaing showed increased areas of fibrosis and honeycombing. We will get results of her most recent PFTs and consider Rituximab. For now, she should continue with leflunomide 20 mg daily. Recommend she have a repeat chest CT at the end of the year. 2. Osteoporosis - She will continue Prolia q6 months. PLAN  1. Leflunomide 20 mg daily   2. Prolia q6 months  3. Obtain results of PFTs  4. Follow up in 4-5 months     Audrey Obando MD  Adult and Pediatric Rheumatology     [facility], [facility], [address] [place], Phone [phone], Fax [phone]     Visiting  of Pediatrics    Department of Pediatrics, [facility] of [address] [place] [address], [address] [place] [address], Phone [phone], Fax [phone]    There are no Patient Instructions on file for this visit. cc:  MD Dr. Herrera Guzman - pulmonology    Rex Valle MD, personally performed the services described in the documentation as scribed by Maris Sung in my presence and have reviewed and agree with the note as scribed. Total time was 40 minutes, greater than 50% of which was spent in counseling and coordination of care. The diagnosis, treatment and various other items were discussed in detail: Test results, medication options, possible side effects, lifestyle changes.

## 2023-08-09 SDOH — ECONOMIC STABILITY: INCOME INSECURITY: HOW HARD IS IT FOR YOU TO PAY FOR THE VERY BASICS LIKE FOOD, HOUSING, MEDICAL CARE, AND HEATING?: NOT HARD AT ALL

## 2023-08-09 SDOH — ECONOMIC STABILITY: FOOD INSECURITY: WITHIN THE PAST 12 MONTHS, THE FOOD YOU BOUGHT JUST DIDN'T LAST AND YOU DIDN'T HAVE MONEY TO GET MORE.: NEVER TRUE

## 2023-08-09 SDOH — ECONOMIC STABILITY: FOOD INSECURITY: WITHIN THE PAST 12 MONTHS, YOU WORRIED THAT YOUR FOOD WOULD RUN OUT BEFORE YOU GOT MONEY TO BUY MORE.: NEVER TRUE

## 2023-08-09 SDOH — ECONOMIC STABILITY: TRANSPORTATION INSECURITY
IN THE PAST 12 MONTHS, HAS LACK OF TRANSPORTATION KEPT YOU FROM MEETINGS, WORK, OR FROM GETTING THINGS NEEDED FOR DAILY LIVING?: NO

## 2023-08-09 SDOH — ECONOMIC STABILITY: HOUSING INSECURITY
IN THE LAST 12 MONTHS, WAS THERE A TIME WHEN YOU DID NOT HAVE A STEADY PLACE TO SLEEP OR SLEPT IN A SHELTER (INCLUDING NOW)?: NO

## 2023-08-10 ENCOUNTER — OFFICE VISIT (OUTPATIENT)
Age: 68
End: 2023-08-10
Payer: MEDICARE

## 2023-08-10 VITALS
OXYGEN SATURATION: 97 % | SYSTOLIC BLOOD PRESSURE: 113 MMHG | WEIGHT: 145 LBS | BODY MASS INDEX: 28.32 KG/M2 | DIASTOLIC BLOOD PRESSURE: 73 MMHG | HEART RATE: 87 BPM

## 2023-08-10 DIAGNOSIS — I10 BENIGN ESSENTIAL HTN: Primary | ICD-10-CM

## 2023-08-10 PROCEDURE — 1036F TOBACCO NON-USER: CPT | Performed by: FAMILY MEDICINE

## 2023-08-10 PROCEDURE — 99213 OFFICE O/P EST LOW 20 MIN: CPT | Performed by: STUDENT IN AN ORGANIZED HEALTH CARE EDUCATION/TRAINING PROGRAM

## 2023-08-10 PROCEDURE — 3074F SYST BP LT 130 MM HG: CPT | Performed by: FAMILY MEDICINE

## 2023-08-10 PROCEDURE — 3078F DIAST BP <80 MM HG: CPT | Performed by: FAMILY MEDICINE

## 2023-08-10 PROCEDURE — 1090F PRES/ABSN URINE INCON ASSESS: CPT | Performed by: FAMILY MEDICINE

## 2023-08-10 PROCEDURE — G8427 DOCREV CUR MEDS BY ELIG CLIN: HCPCS | Performed by: FAMILY MEDICINE

## 2023-08-10 PROCEDURE — G8419 CALC BMI OUT NRM PARAM NOF/U: HCPCS | Performed by: FAMILY MEDICINE

## 2023-08-10 PROCEDURE — 1123F ACP DISCUSS/DSCN MKR DOCD: CPT | Performed by: FAMILY MEDICINE

## 2023-08-10 PROCEDURE — 3017F COLORECTAL CA SCREEN DOC REV: CPT | Performed by: FAMILY MEDICINE

## 2023-08-10 PROCEDURE — G8399 PT W/DXA RESULTS DOCUMENT: HCPCS | Performed by: FAMILY MEDICINE

## 2023-08-10 SDOH — ECONOMIC STABILITY: INCOME INSECURITY: HOW HARD IS IT FOR YOU TO PAY FOR THE VERY BASICS LIKE FOOD, HOUSING, MEDICAL CARE, AND HEATING?: NOT HARD AT ALL

## 2023-08-10 SDOH — ECONOMIC STABILITY: FOOD INSECURITY: WITHIN THE PAST 12 MONTHS, YOU WORRIED THAT YOUR FOOD WOULD RUN OUT BEFORE YOU GOT MONEY TO BUY MORE.: NEVER TRUE

## 2023-08-10 SDOH — ECONOMIC STABILITY: FOOD INSECURITY: WITHIN THE PAST 12 MONTHS, THE FOOD YOU BOUGHT JUST DIDN'T LAST AND YOU DIDN'T HAVE MONEY TO GET MORE.: NEVER TRUE

## 2023-08-10 ASSESSMENT — PATIENT HEALTH QUESTIONNAIRE - PHQ9
SUM OF ALL RESPONSES TO PHQ QUESTIONS 1-9: 0
SUM OF ALL RESPONSES TO PHQ QUESTIONS 1-9: 0
1. LITTLE INTEREST OR PLEASURE IN DOING THINGS: 0
SUM OF ALL RESPONSES TO PHQ9 QUESTIONS 1 & 2: 0
SUM OF ALL RESPONSES TO PHQ QUESTIONS 1-9: 0
2. FEELING DOWN, DEPRESSED OR HOPELESS: 0
SUM OF ALL RESPONSES TO PHQ QUESTIONS 1-9: 0

## 2023-08-10 NOTE — PROGRESS NOTES
Chief Complaint   Patient presents with    Hypertension     Vitals:    08/10/23 1025   BP: 113/73   Pulse: 87   SpO2: 97%

## 2023-08-25 NOTE — TELEPHONE ENCOUNTER
Medication Refill Request    Dariel Raines is requesting a refill of the following medication(s):   Requested Prescriptions     Pending Prescriptions Disp Refills    atorvastatin (LIPITOR) 40 MG tablet [Pharmacy Med Name: ATORVASTATIN TAB 40MG] 30 tablet 0     Sig: TAKE 1 TABLET DAILY      Last provider to prescribe medication: Dr. Debra Fry  Last Date of Medication Prescribed: 07/25/2023   Last Office Visit Date: 08/10/2023  Last Labs:  Lab Results   Component Value Date    CHOL 145 12/03/2021    TRIG 121 12/03/2021    HDL 45 12/03/2021    LDLCALC 75.8 12/03/2021    LABVLDL 24.2 12/03/2021    CHOLHDLRATIO 3.2 12/03/2021       Please send refill to:    [address] [facility] #[id number] - [address] [phone] - F [phone]  [address] [address]  Phone: [phone] Fax: [phone] [address] [facility] at [address] [facility], [address] [phone] Eladio Kelly [phone]  [facility] [place] [address]  Phone: [phone] Fax: [phone]    [place] [phone] Eladio Kelly [facility] [address]  Phone: [phone] Fax: [phone]

## 2023-08-27 RX ORDER — ATORVASTATIN CALCIUM 40 MG/1
TABLET, FILM COATED ORAL
Qty: 90 TABLET | Refills: 1 | Status: SHIPPED | OUTPATIENT
Start: 2023-08-27

## 2023-10-17 RX ORDER — HYDROCHLOROTHIAZIDE 25 MG/1
TABLET ORAL
Qty: 90 TABLET | Refills: 1 | Status: SHIPPED | OUTPATIENT
Start: 2023-10-17

## 2023-11-20 ENCOUNTER — TELEPHONE (OUTPATIENT)
Age: 68
End: 2023-11-20

## 2023-11-20 NOTE — TELEPHONE ENCOUNTER
Called pt to reschedule her Prolia appt since she No Showed. Pt states earlier this month she called to cancell the appt today with Dr. Angel Brooks and is currently out of town. Pt thought her Prolia was rescheduled like her Dillon appt. Apologized as that was not the case but understood. Rescheduled her Prolia to same day at [facility] appt on 12/12. Reiterated need for update labs for Prolia treatment. Pt requesting new lab slip be mailed to her.

## 2023-11-29 ENCOUNTER — TELEPHONE (OUTPATIENT)
Age: 68
End: 2023-11-29

## 2023-11-29 NOTE — TELEPHONE ENCOUNTER
PT called regarding labs,I informed her that [address] [place] [address] can see labs in chart and if they can't we fax them over and she stated she understood

## 2023-12-12 ENCOUNTER — OFFICE VISIT (OUTPATIENT)
Age: 68
End: 2023-12-12
Payer: MEDICARE

## 2023-12-12 ENCOUNTER — NURSE ONLY (OUTPATIENT)
Age: 68
End: 2023-12-12
Payer: MEDICARE

## 2023-12-12 VITALS
WEIGHT: 139 LBS | SYSTOLIC BLOOD PRESSURE: 135 MMHG | TEMPERATURE: 98 F | RESPIRATION RATE: 16 BRPM | OXYGEN SATURATION: 95 % | BODY MASS INDEX: 27.15 KG/M2 | DIASTOLIC BLOOD PRESSURE: 86 MMHG | HEART RATE: 97 BPM

## 2023-12-12 VITALS
HEART RATE: 97 BPM | OXYGEN SATURATION: 96 % | RESPIRATION RATE: 16 BRPM | DIASTOLIC BLOOD PRESSURE: 86 MMHG | BODY MASS INDEX: 27.13 KG/M2 | WEIGHT: 138.89 LBS | SYSTOLIC BLOOD PRESSURE: 135 MMHG | TEMPERATURE: 98 F

## 2023-12-12 DIAGNOSIS — M81.8 OTHER OSTEOPOROSIS WITHOUT CURRENT PATHOLOGICAL FRACTURE: ICD-10-CM

## 2023-12-12 DIAGNOSIS — M81.0 AGE-RELATED OSTEOPOROSIS WITHOUT CURRENT PATHOLOGICAL FRACTURE: Primary | ICD-10-CM

## 2023-12-12 DIAGNOSIS — M05.9 RHEUMATOID ARTHRITIS WITH RHEUMATOID FACTOR, UNSPECIFIED (HCC): ICD-10-CM

## 2023-12-12 PROCEDURE — 3017F COLORECTAL CA SCREEN DOC REV: CPT | Performed by: PEDIATRICS

## 2023-12-12 PROCEDURE — G8419 CALC BMI OUT NRM PARAM NOF/U: HCPCS | Performed by: PEDIATRICS

## 2023-12-12 PROCEDURE — 96372 THER/PROPH/DIAG INJ SC/IM: CPT | Performed by: PEDIATRICS

## 2023-12-12 PROCEDURE — 1036F TOBACCO NON-USER: CPT | Performed by: PEDIATRICS

## 2023-12-12 PROCEDURE — PBSHW PBB SHADOW CHARGE: Performed by: PEDIATRICS

## 2023-12-12 PROCEDURE — G8484 FLU IMMUNIZE NO ADMIN: HCPCS | Performed by: PEDIATRICS

## 2023-12-12 PROCEDURE — 99214 OFFICE O/P EST MOD 30 MIN: CPT | Performed by: PEDIATRICS

## 2023-12-12 PROCEDURE — 1123F ACP DISCUSS/DSCN MKR DOCD: CPT | Performed by: PEDIATRICS

## 2023-12-12 PROCEDURE — 1090F PRES/ABSN URINE INCON ASSESS: CPT | Performed by: PEDIATRICS

## 2023-12-12 PROCEDURE — 3075F SYST BP GE 130 - 139MM HG: CPT | Performed by: PEDIATRICS

## 2023-12-12 PROCEDURE — G8399 PT W/DXA RESULTS DOCUMENT: HCPCS | Performed by: PEDIATRICS

## 2023-12-12 PROCEDURE — 3079F DIAST BP 80-89 MM HG: CPT | Performed by: PEDIATRICS

## 2023-12-12 PROCEDURE — G8427 DOCREV CUR MEDS BY ELIG CLIN: HCPCS | Performed by: PEDIATRICS

## 2023-12-12 RX ORDER — EPINEPHRINE 1 MG/ML
0.3 INJECTION, SOLUTION, CONCENTRATE INTRAVENOUS PRN
OUTPATIENT
Start: 2024-05-12

## 2023-12-12 RX ORDER — DIPHENHYDRAMINE HYDROCHLORIDE 50 MG/ML
50 INJECTION INTRAMUSCULAR; INTRAVENOUS
OUTPATIENT
Start: 2024-05-12

## 2023-12-12 RX ORDER — ONDANSETRON 2 MG/ML
8 INJECTION INTRAMUSCULAR; INTRAVENOUS
OUTPATIENT
Start: 2024-05-12

## 2023-12-12 RX ORDER — SODIUM CHLORIDE 9 MG/ML
INJECTION, SOLUTION INTRAVENOUS CONTINUOUS
OUTPATIENT
Start: 2024-05-12

## 2023-12-12 RX ORDER — ALBUTEROL SULFATE 90 UG/1
4 AEROSOL, METERED RESPIRATORY (INHALATION) PRN
OUTPATIENT
Start: 2024-05-12

## 2023-12-12 RX ORDER — ACETAMINOPHEN 325 MG/1
650 TABLET ORAL
OUTPATIENT
Start: 2024-05-12

## 2023-12-12 RX ADMIN — DENOSUMAB 60 MG: 60 INJECTION SUBCUTANEOUS at 09:40

## 2023-12-12 ASSESSMENT — PAIN SCALES - GENERAL: PAINLEVEL_OUTOF10: 0

## 2023-12-12 ASSESSMENT — PATIENT HEALTH QUESTIONNAIRE - PHQ9
SUM OF ALL RESPONSES TO PHQ QUESTIONS 1-9: 0
1. LITTLE INTEREST OR PLEASURE IN DOING THINGS: 0
SUM OF ALL RESPONSES TO PHQ QUESTIONS 1-9: 0
SUM OF ALL RESPONSES TO PHQ QUESTIONS 1-9: 0
SUM OF ALL RESPONSES TO PHQ9 QUESTIONS 1 & 2: 0
SUM OF ALL RESPONSES TO PHQ QUESTIONS 1-9: 0
2. FEELING DOWN, DEPRESSED OR HOPELESS: 0

## 2023-12-12 NOTE — PROGRESS NOTES
[facility]    Date: 2023  Name: Maliha Owens  MRN: [id number]       : 1955  Diagnosis: Osteoporosis (M81.0)   Treatment: Prolia q 26 weeks  Referring Provider: Dr. Geetha Busby  Supervising Provider: Dr. Jesús Salinas surgery? NO  Labs WNL for treatment? YES    Recent labs results:  Lab Results   Component Value Date/Time     2023 03:53 PM    K 3.9 2023 03:53 PM     2023 03:53 PM    CO2 26 2023 03:53 PM    BUN 16 2023 03:53 PM    CREATININE 0.80 2023 03:53 PM    GLUCOSE 94 2023 03:53 PM    CALCIUM 11.6 2023 03:53 PM    LABGLOM 80 2023 03:53 PM    Normal Ca+ per lab standards 8.5-10.1    Administrations This Visit       denosumab (PROLIA) SC injection 60 mg       Admin Date  2023 Action  Given Dose  60 mg Route  SubCUTAneous Administered By  Fred Hansen RN                Prolia 60mg/1ml (0mg drug wasted)  [facility] [id number]  Lot # [id number]  Exp 3/31/26    No medication reaction seen in presence of nurse. Pt tolerated the treatment. Future lab order placed and future appts scheduled prior to leaving. Pt discharged in stable condition at 0950. AVS provided unless declined. No future appointments. Next appt AFTER . [facility] slip provided to patient.      Fred Hansen RN

## 2023-12-12 NOTE — PROGRESS NOTES
Chief Complaint   Patient presents with    Joint Pain     1. Have you been to the ER, urgent care clinic since your last visit? Hospitalized since your last visit? No    2. Have you seen or consulted any other health care providers outside of the [address] [place] [address] since your last visit? Include any pap smears or colon screening.  No
Melina Wan
and of the right total hip of 3.7%, and no change in the bone mineral density of the left distal third radius. 10 year probability of major osteoporotic fracture: 6.7%. 10 year probability of hip fracture: 0.7%. CHEST CT (2/17/22): No significant interval change in honeycombing, fibrosis, and bronchiectasis. CT chest (2/6/21):    1. The lungs demonstrate persistent abnormality bilaterally this is most pronounced in the periphery of the upper lobes anteriorly does involve all lobes. Findings are consistent with peripheral areas of fibrosis and honeycombing. The pattern is similar. There is a fibrosis appears somewhat better defined in some of the honeycomb cysts are slightly larger. This is only minimal change. 2. Overall the patient effected a better degree of inspiration on today's examination mild areas of bronchiectasis are noted in the lower lobes. This is better seen on this study than on the previous exam and is mild but may have progressed slightly. 3. No adenopathy CT chest (11/11/19): 1. There are peripheral reticular opacities with honeycombing. This is most pronounced anteriorly in each upper lobe left greater than right also involves the lower lobes. These findings are consistent with pulmonary  fibrosis. Pattern  is not typical for UIP. 2. Adenopathy as described above. This could be reactive. Exact etiology is uncertain. Follow-up exam in 3-6 months may yield more information. Hand X-rays (3/2019): Personally reviewed images. + erosions consistent with RA and severe OA    Foot x-rays (3/2019): Personally reviewed images. + erosions    DEXA (3/2019): T score -2.6 at the left radius     ASSESSMENT  1. Seropositive rheumatoid arthritis - The patient has been doing well on current regimen. We will get results of her most recent PFTs. For now, she should continue with leflunomide 20 mg daily. Lab work ordered today. 2. Osteoporosis - She will continue Prolia q6 months. PLAN  1.

## 2024-02-15 LAB
ALBUMIN SERPL-MCNC: 4.4 G/DL (ref 3.9–4.9)
ALBUMIN/GLOB SERPL: 1.8 {RATIO} (ref 1.2–2.2)
ALP SERPL-CCNC: 50 IU/L (ref 44–121)
ALT SERPL-CCNC: 17 IU/L (ref 0–32)
AST SERPL-CCNC: 23 IU/L (ref 0–40)
BASOPHILS # BLD AUTO: 0.1 X10E3/UL (ref 0–0.2)
BASOPHILS NFR BLD AUTO: 1 %
BILIRUB SERPL-MCNC: 0.3 MG/DL (ref 0–1.2)
BUN SERPL-MCNC: 13 MG/DL (ref 8–27)
BUN/CREAT SERPL: 17 (ref 12–28)
CALCIUM SERPL-MCNC: 10.8 MG/DL (ref 8.7–10.3)
CHLORIDE SERPL-SCNC: 100 MMOL/L (ref 96–106)
CO2 SERPL-SCNC: 25 MMOL/L (ref 20–29)
CREAT SERPL-MCNC: 0.75 MG/DL (ref 0.57–1)
EGFRCR SERPLBLD CKD-EPI 2021: 86 ML/MIN/1.73
EOSINOPHIL # BLD AUTO: 0.3 X10E3/UL (ref 0–0.4)
EOSINOPHIL NFR BLD AUTO: 3 %
ERYTHROCYTE [DISTWIDTH] IN BLOOD BY AUTOMATED COUNT: 11.5 % (ref 11.7–15.4)
GLOBULIN SER CALC-MCNC: 2.4 G/DL (ref 1.5–4.5)
GLUCOSE SERPL-MCNC: 97 MG/DL (ref 70–99)
HCT VFR BLD AUTO: 39.2 % (ref 34–46.6)
HGB BLD-MCNC: 12.9 G/DL (ref 11.1–15.9)
IMM GRANULOCYTES # BLD AUTO: 0 X10E3/UL (ref 0–0.1)
IMM GRANULOCYTES NFR BLD AUTO: 0 %
LYMPHOCYTES # BLD AUTO: 2.4 X10E3/UL (ref 0.7–3.1)
LYMPHOCYTES NFR BLD AUTO: 23 %
MCH RBC QN AUTO: 31.5 PG (ref 26.6–33)
MCHC RBC AUTO-ENTMCNC: 32.9 G/DL (ref 31.5–35.7)
MCV RBC AUTO: 96 FL (ref 79–97)
MONOCYTES # BLD AUTO: 0.7 X10E3/UL (ref 0.1–0.9)
MONOCYTES NFR BLD AUTO: 7 %
NEUTROPHILS # BLD AUTO: 6.8 X10E3/UL (ref 1.4–7)
NEUTROPHILS NFR BLD AUTO: 66 %
PHOSPHATE SERPL-MCNC: 3.2 MG/DL (ref 3–4.3)
PLATELET # BLD AUTO: 359 X10E3/UL (ref 150–450)
POTASSIUM SERPL-SCNC: 4 MMOL/L (ref 3.5–5.2)
PROT SERPL-MCNC: 6.8 G/DL (ref 6–8.5)
RBC # BLD AUTO: 4.09 X10E6/UL (ref 3.77–5.28)
SODIUM SERPL-SCNC: 140 MMOL/L (ref 134–144)
WBC # BLD AUTO: 10.2 X10E3/UL (ref 3.4–10.8)

## 2024-03-20 ENCOUNTER — OFFICE VISIT (OUTPATIENT)
Age: 69
End: 2024-03-20
Payer: MEDICARE

## 2024-03-20 ENCOUNTER — ANCILLARY PROCEDURE (OUTPATIENT)
Age: 69
End: 2024-03-20
Payer: MEDICARE

## 2024-03-20 VITALS
HEART RATE: 87 BPM | BODY MASS INDEX: 26.95 KG/M2 | WEIGHT: 138 LBS | TEMPERATURE: 97.9 F | RESPIRATION RATE: 16 BRPM | DIASTOLIC BLOOD PRESSURE: 84 MMHG | SYSTOLIC BLOOD PRESSURE: 125 MMHG | OXYGEN SATURATION: 98 %

## 2024-03-20 DIAGNOSIS — J84.9 ILD (INTERSTITIAL LUNG DISEASE) (HCC): ICD-10-CM

## 2024-03-20 DIAGNOSIS — M05.9 RHEUMATOID ARTHRITIS WITH RHEUMATOID FACTOR, UNSPECIFIED (HCC): Primary | ICD-10-CM

## 2024-03-20 DIAGNOSIS — M05.9 RHEUMATOID ARTHRITIS WITH RHEUMATOID FACTOR, UNSPECIFIED (HCC): ICD-10-CM

## 2024-03-20 PROCEDURE — 73560 X-RAY EXAM OF KNEE 1 OR 2: CPT | Performed by: PEDIATRICS

## 2024-03-20 PROCEDURE — G8419 CALC BMI OUT NRM PARAM NOF/U: HCPCS | Performed by: PEDIATRICS

## 2024-03-20 PROCEDURE — 99215 OFFICE O/P EST HI 40 MIN: CPT | Performed by: PEDIATRICS

## 2024-03-20 PROCEDURE — 71046 X-RAY EXAM CHEST 2 VIEWS: CPT | Performed by: PEDIATRICS

## 2024-03-20 PROCEDURE — 3079F DIAST BP 80-89 MM HG: CPT | Performed by: PEDIATRICS

## 2024-03-20 PROCEDURE — 1036F TOBACCO NON-USER: CPT | Performed by: PEDIATRICS

## 2024-03-20 PROCEDURE — 3074F SYST BP LT 130 MM HG: CPT | Performed by: PEDIATRICS

## 2024-03-20 PROCEDURE — G8484 FLU IMMUNIZE NO ADMIN: HCPCS | Performed by: PEDIATRICS

## 2024-03-20 PROCEDURE — 71046 X-RAY EXAM CHEST 2 VIEWS: CPT | Performed by: RADIOLOGY

## 2024-03-20 PROCEDURE — G8428 CUR MEDS NOT DOCUMENT: HCPCS | Performed by: PEDIATRICS

## 2024-03-20 PROCEDURE — 1090F PRES/ABSN URINE INCON ASSESS: CPT | Performed by: PEDIATRICS

## 2024-03-20 PROCEDURE — 3017F COLORECTAL CA SCREEN DOC REV: CPT | Performed by: PEDIATRICS

## 2024-03-20 PROCEDURE — 1123F ACP DISCUSS/DSCN MKR DOCD: CPT | Performed by: PEDIATRICS

## 2024-03-20 PROCEDURE — G8399 PT W/DXA RESULTS DOCUMENT: HCPCS | Performed by: PEDIATRICS

## 2024-03-20 RX ORDER — PREDNISONE 5 MG/1
5 TABLET ORAL DAILY
Qty: 30 TABLET | Refills: 1 | Status: SHIPPED | OUTPATIENT
Start: 2024-03-20

## 2024-03-20 ASSESSMENT — PATIENT HEALTH QUESTIONNAIRE - PHQ9
SUM OF ALL RESPONSES TO PHQ QUESTIONS 1-9: 0
2. FEELING DOWN, DEPRESSED OR HOPELESS: NOT AT ALL
1. LITTLE INTEREST OR PLEASURE IN DOING THINGS: NOT AT ALL
SUM OF ALL RESPONSES TO PHQ QUESTIONS 1-9: 0
SUM OF ALL RESPONSES TO PHQ9 QUESTIONS 1 & 2: 0

## 2024-03-20 NOTE — PROGRESS NOTES
Chief Complaint   Patient presents with    Joint Pain     1. Have you been to the ER, urgent care clinic since your last visit?  Hospitalized since your last visit?No    2. Have you seen or consulted any other health care providers outside of the [facility] since your last visit?  Include any pap smears or colon screening. No

## 2024-03-20 NOTE — PROGRESS NOTES
RHEUMATOLOGY PROBLEM LIST AND CHIEF COMPLAINT  1. Seropositive Rheumatoid Arthritis; erosive  2. Osteoporosis  3. ILD / Fibrosis      Year of diagnosis: 2013   First visit with this clinic:  3/2019   Cumulative disease manifestations:    Positive serologies/labs: RF, CCP   Negative serologies/labs:    Extra-articular comorbidities: Pulmonary fibrosis    Other co-morbidities:  Osteoporosis, low-grade hyperCa   Relapses: 12/2018 but on not treatment      Past treatment history:  Prednisone:   Non-biologic DMARD: Methotrexate 20 mg oral weekly (2013-1/2020; d/gala due to hair  loss) Leflunomide 20 mg oral daily (1/2020-present)  Biologic:  Other: Prolia 11/2019-  Broke wrist in 2006, never felt it healed entirely, went for Xrays and told she had arthritis. Started methotrexate but alopecia. Changed to leflunomide with better tolerability and joint control.    INTERVAL HISTORY  This is a 68 y.o.  female.  Today, the patient complains of pain in the joints.  Location: b/l knees  Severity:  9 on a scale of 0-10  Timing: intermittent   Duration: 3 months  Modifying factors:   Context/Associated signs and symptoms: The patient continues on leflunomide 20 mg daily for RA and Prolia q6 months for osteoporosis. We reviewed her PFTs from Feb 2023 showing reduce diffusion capacity. Her chest CT from 4/3/23 showed bilateral areas of fibrosis and honeycombing with upper lobe predominance are again noted and overall have increased in the interval. She last saw Dr. Steward (pulmonology) last year. She denies any shortness of breath. She walks 2 hours daily and does not feel short of breath during her walks. Her chief complaint today is stiffness in her knees and pain in her b/l posterior knees. Morning stiffness lasts about 5 minutes. She also describes having pain \"everywhere there is a bone.\" She denies any muscle weakness.       RHEUMATOLOGY REVIEW OF SYSTEMS   Positives as per history  Negatives as

## 2024-03-21 ENCOUNTER — TELEPHONE (OUTPATIENT)
Age: 69
End: 2024-03-21

## 2024-03-21 NOTE — TELEPHONE ENCOUNTER
PT called and stated that she's been Prednisone taking and she wants to know if she should continue taking it. PT stated that she's been experiencing side effects such as: being light headed, indigestion, heart papulations and nervous feeling.

## 2024-03-25 NOTE — TELEPHONE ENCOUNTER
Spoke with patient HIPAA verified . Advised per Dr Dillon that Okay, stop taking it. We are going to wait until we get all the study results we ordered.

## 2024-03-27 ENCOUNTER — TELEPHONE (OUTPATIENT)
Age: 69
End: 2024-03-27

## 2024-03-27 NOTE — TELEPHONE ENCOUNTER
PT would like to know if there was something that can be prescribed besides Prednisone because she can't take it

## 2024-04-02 ENCOUNTER — HOSPITAL ENCOUNTER (OUTPATIENT)
Facility: HOSPITAL | Age: 69
Discharge: HOME | End: 2024-04-05
Attending: PEDIATRICS
Payer: MEDICARE

## 2024-04-02 DIAGNOSIS — J84.9 ILD (INTERSTITIAL LUNG DISEASE) (HCC): ICD-10-CM

## 2024-04-02 DIAGNOSIS — M05.9 RHEUMATOID ARTHRITIS WITH RHEUMATOID FACTOR, UNSPECIFIED (HCC): ICD-10-CM

## 2024-04-02 PROCEDURE — 94729 DIFFUSING CAPACITY: CPT

## 2024-04-02 PROCEDURE — 94726 PLETHYSMOGRAPHY LUNG VOLUMES: CPT

## 2024-04-02 PROCEDURE — 71250 CT THORAX DX C-: CPT

## 2024-04-02 PROCEDURE — 94010 BREATHING CAPACITY TEST: CPT

## 2024-04-02 PROCEDURE — 94060 EVALUATION OF WHEEZING: CPT

## 2024-05-17 ENCOUNTER — OFFICE VISIT (OUTPATIENT)
Age: 69
End: 2024-05-17
Payer: MEDICARE

## 2024-05-17 VITALS
HEART RATE: 87 BPM | BODY MASS INDEX: 26.5 KG/M2 | WEIGHT: 135 LBS | TEMPERATURE: 98 F | DIASTOLIC BLOOD PRESSURE: 73 MMHG | OXYGEN SATURATION: 95 % | RESPIRATION RATE: 18 BRPM | HEIGHT: 60 IN | SYSTOLIC BLOOD PRESSURE: 112 MMHG

## 2024-05-17 DIAGNOSIS — E78.5 HYPERLIPIDEMIA, UNSPECIFIED HYPERLIPIDEMIA TYPE: ICD-10-CM

## 2024-05-17 DIAGNOSIS — E83.52 HYPERCALCEMIA: ICD-10-CM

## 2024-05-17 DIAGNOSIS — Z12.31 ENCOUNTER FOR SCREENING MAMMOGRAM FOR MALIGNANT NEOPLASM OF BREAST: ICD-10-CM

## 2024-05-17 DIAGNOSIS — I10 BENIGN ESSENTIAL HTN: Primary | ICD-10-CM

## 2024-05-17 DIAGNOSIS — T78.40XA WHEEZING DUE TO ALLERGY: ICD-10-CM

## 2024-05-17 DIAGNOSIS — R06.2 WHEEZING DUE TO ALLERGY: ICD-10-CM

## 2024-05-17 PROCEDURE — G8427 DOCREV CUR MEDS BY ELIG CLIN: HCPCS | Performed by: STUDENT IN AN ORGANIZED HEALTH CARE EDUCATION/TRAINING PROGRAM

## 2024-05-17 PROCEDURE — 3017F COLORECTAL CA SCREEN DOC REV: CPT | Performed by: STUDENT IN AN ORGANIZED HEALTH CARE EDUCATION/TRAINING PROGRAM

## 2024-05-17 PROCEDURE — 1090F PRES/ABSN URINE INCON ASSESS: CPT | Performed by: STUDENT IN AN ORGANIZED HEALTH CARE EDUCATION/TRAINING PROGRAM

## 2024-05-17 PROCEDURE — G8399 PT W/DXA RESULTS DOCUMENT: HCPCS | Performed by: STUDENT IN AN ORGANIZED HEALTH CARE EDUCATION/TRAINING PROGRAM

## 2024-05-17 PROCEDURE — 3078F DIAST BP <80 MM HG: CPT | Performed by: STUDENT IN AN ORGANIZED HEALTH CARE EDUCATION/TRAINING PROGRAM

## 2024-05-17 PROCEDURE — 3074F SYST BP LT 130 MM HG: CPT | Performed by: STUDENT IN AN ORGANIZED HEALTH CARE EDUCATION/TRAINING PROGRAM

## 2024-05-17 PROCEDURE — 1123F ACP DISCUSS/DSCN MKR DOCD: CPT | Performed by: STUDENT IN AN ORGANIZED HEALTH CARE EDUCATION/TRAINING PROGRAM

## 2024-05-17 PROCEDURE — 99214 OFFICE O/P EST MOD 30 MIN: CPT | Performed by: STUDENT IN AN ORGANIZED HEALTH CARE EDUCATION/TRAINING PROGRAM

## 2024-05-17 PROCEDURE — G8419 CALC BMI OUT NRM PARAM NOF/U: HCPCS | Performed by: STUDENT IN AN ORGANIZED HEALTH CARE EDUCATION/TRAINING PROGRAM

## 2024-05-17 PROCEDURE — 1036F TOBACCO NON-USER: CPT | Performed by: STUDENT IN AN ORGANIZED HEALTH CARE EDUCATION/TRAINING PROGRAM

## 2024-05-17 RX ORDER — HYDROCHLOROTHIAZIDE 25 MG/1
25 TABLET ORAL DAILY
Qty: 90 TABLET | Refills: 1 | Status: SHIPPED | OUTPATIENT
Start: 2024-05-17

## 2024-05-17 RX ORDER — MONTELUKAST SODIUM 10 MG/1
10 TABLET ORAL NIGHTLY
Qty: 90 TABLET | Refills: 1 | Status: SHIPPED | OUTPATIENT
Start: 2024-05-17

## 2024-05-17 RX ORDER — FLUTICASONE FUROATE AND VILANTEROL TRIFENATATE 200; 25 UG/1; UG/1
1 POWDER RESPIRATORY (INHALATION) DAILY
Qty: 60 EACH | Refills: 1 | Status: SHIPPED | OUTPATIENT
Start: 2024-05-17

## 2024-05-17 RX ORDER — ATORVASTATIN CALCIUM 40 MG/1
40 TABLET, FILM COATED ORAL DAILY
Qty: 90 TABLET | Refills: 1 | Status: SHIPPED | OUTPATIENT
Start: 2024-05-17

## 2024-05-17 ASSESSMENT — PATIENT HEALTH QUESTIONNAIRE - PHQ9
SUM OF ALL RESPONSES TO PHQ QUESTIONS 1-9: 0
SUM OF ALL RESPONSES TO PHQ QUESTIONS 1-9: 0
SUM OF ALL RESPONSES TO PHQ9 QUESTIONS 1 & 2: 0
1. LITTLE INTEREST OR PLEASURE IN DOING THINGS: NOT AT ALL
SUM OF ALL RESPONSES TO PHQ QUESTIONS 1-9: 0
SUM OF ALL RESPONSES TO PHQ QUESTIONS 1-9: 0
2. FEELING DOWN, DEPRESSED OR HOPELESS: NOT AT ALL

## 2024-05-17 NOTE — PROGRESS NOTES
I saw and evaluated the patient, performing the key elements of the service.  I discussed the findings, assessment and plan with the resident and agree with the resident's findings and plan as documented in the resident's note.    Hypertension - lab monitoring and refills (controlled), work up of hypercalcemia.   
Identified pt with two pt identifiers(name and ). Reviewed record in preparation for visit and have obtained necessary documentation.  Chief Complaint   Patient presents with    Annual Exam     No other concerns.        Vitals:    24 0914   BP: 112/73   Site: Right Upper Arm   Position: Sitting   Cuff Size: Medium Adult   Pulse: 87   Resp: 18   Temp: 98 °F (36.7 °C)   TempSrc: Oral   SpO2: 95%   Weight: 61.2 kg (135 lb)   Height: 1.524 m (5')         Coordination of Care Questionnaire:  :     \"Have you been to the ER, urgent care clinic since your last visit?  Hospitalized since your last visit?\"    YES - When: approximately 1 months ago.  Where and Why: [place] lightheaded & dizziness.    “Have you seen or consulted any other health care providers outside of [facility] since your last visit?”    NO    Have you had a mammogram?”   NO    No breast cancer screening on file         “Have you had a colorectal cancer screening such as a colonoscopy/FIT/Cologuard?    NO    No colonoscopy on file  No cologuard on file  No FIT/FOBT on file   No flexible sigmoidoscopy on file         Click Here for Release of Records Request   
Metabolic Panel; Future  -     Comprehensive Metabolic Panel  -     Vitamin D 25 Hydroxy  -     PTH-Related Peptide  -     Vitamin D 1,25 Dihydroxy          Patient is counseled to return to the office if symptoms do not improve as expected.  Urgent consultation with the nearest Emergency Department is strongly recommended if condition worsens.  Patient is counseled to follow up as recommended and to inform the office if any changes in treatment are recommended.      I discussed this patient with Dr. Ocampo (Attending Physician)       Signed By:  Danny Peterson MD  Family Medicine Resident

## 2024-05-18 LAB
25(OH)D3 SERPL-MCNC: 100.9 NG/ML (ref 30–100)
ALBUMIN SERPL-MCNC: 3.7 G/DL (ref 3.5–5)
ALBUMIN/GLOB SERPL: 1 (ref 1.1–2.2)
ALP SERPL-CCNC: 62 U/L (ref 45–117)
ALT SERPL-CCNC: 16 U/L (ref 12–78)
ANION GAP SERPL CALC-SCNC: 8 MMOL/L (ref 5–15)
AST SERPL-CCNC: 18 U/L (ref 15–37)
BILIRUB SERPL-MCNC: 0.7 MG/DL (ref 0.2–1)
BUN SERPL-MCNC: 15 MG/DL (ref 6–20)
BUN/CREAT SERPL: 16 (ref 12–20)
CALCIUM SERPL-MCNC: 11.8 MG/DL (ref 8.5–10.1)
CHLORIDE SERPL-SCNC: 102 MMOL/L (ref 97–108)
CHOLEST SERPL-MCNC: 136 MG/DL
CO2 SERPL-SCNC: 29 MMOL/L (ref 21–32)
CREAT SERPL-MCNC: 0.92 MG/DL (ref 0.55–1.02)
ERYTHROCYTE [DISTWIDTH] IN BLOOD BY AUTOMATED COUNT: 12.4 % (ref 11.5–14.5)
GLOBULIN SER CALC-MCNC: 3.7 G/DL (ref 2–4)
GLUCOSE SERPL-MCNC: 114 MG/DL (ref 65–100)
HCT VFR BLD AUTO: 39.2 % (ref 35–47)
HDLC SERPL-MCNC: 48 MG/DL
HDLC SERPL: 2.8 (ref 0–5)
HGB BLD-MCNC: 12.1 G/DL (ref 11.5–16)
LDLC SERPL CALC-MCNC: 68 MG/DL (ref 0–100)
MCH RBC QN AUTO: 30.7 PG (ref 26–34)
MCHC RBC AUTO-ENTMCNC: 30.9 G/DL (ref 30–36.5)
MCV RBC AUTO: 99.5 FL (ref 80–99)
NRBC # BLD: 0 K/UL (ref 0–0.01)
NRBC BLD-RTO: 0 PER 100 WBC
PLATELET # BLD AUTO: 392 K/UL (ref 150–400)
PMV BLD AUTO: 10.1 FL (ref 8.9–12.9)
POTASSIUM SERPL-SCNC: 3.5 MMOL/L (ref 3.5–5.1)
PROT SERPL-MCNC: 7.4 G/DL (ref 6.4–8.2)
RBC # BLD AUTO: 3.94 M/UL (ref 3.8–5.2)
SODIUM SERPL-SCNC: 139 MMOL/L (ref 136–145)
TRIGL SERPL-MCNC: 100 MG/DL
VLDLC SERPL CALC-MCNC: 20 MG/DL
WBC # BLD AUTO: 11.9 K/UL (ref 3.6–11)

## 2024-05-20 ENCOUNTER — NURSE ONLY (OUTPATIENT)
Age: 69
End: 2024-05-20

## 2024-05-20 ENCOUNTER — OFFICE VISIT (OUTPATIENT)
Age: 69
End: 2024-05-20
Payer: MEDICARE

## 2024-05-20 VITALS
SYSTOLIC BLOOD PRESSURE: 143 MMHG | TEMPERATURE: 97.7 F | BODY MASS INDEX: 25.97 KG/M2 | OXYGEN SATURATION: 94 % | DIASTOLIC BLOOD PRESSURE: 91 MMHG | WEIGHT: 133 LBS | RESPIRATION RATE: 16 BRPM | HEART RATE: 86 BPM

## 2024-05-20 VITALS
SYSTOLIC BLOOD PRESSURE: 143 MMHG | TEMPERATURE: 97.7 F | HEART RATE: 86 BPM | DIASTOLIC BLOOD PRESSURE: 91 MMHG | OXYGEN SATURATION: 94 % | RESPIRATION RATE: 16 BRPM

## 2024-05-20 DIAGNOSIS — M81.0 AGE-RELATED OSTEOPOROSIS WITHOUT CURRENT PATHOLOGICAL FRACTURE: ICD-10-CM

## 2024-05-20 DIAGNOSIS — M81.0 AGE-RELATED OSTEOPOROSIS WITHOUT CURRENT PATHOLOGICAL FRACTURE: Primary | ICD-10-CM

## 2024-05-20 DIAGNOSIS — M05.9 RHEUMATOID ARTHRITIS WITH RHEUMATOID FACTOR, UNSPECIFIED (HCC): ICD-10-CM

## 2024-05-20 DIAGNOSIS — J84.9 ILD (INTERSTITIAL LUNG DISEASE) (HCC): Primary | ICD-10-CM

## 2024-05-20 LAB — 1,25(OH)2D3 SERPL-MCNC: 43.4 PG/ML (ref 24.8–81.5)

## 2024-05-20 PROCEDURE — 1090F PRES/ABSN URINE INCON ASSESS: CPT | Performed by: PEDIATRICS

## 2024-05-20 PROCEDURE — G8427 DOCREV CUR MEDS BY ELIG CLIN: HCPCS | Performed by: PEDIATRICS

## 2024-05-20 PROCEDURE — 1036F TOBACCO NON-USER: CPT | Performed by: PEDIATRICS

## 2024-05-20 PROCEDURE — 3017F COLORECTAL CA SCREEN DOC REV: CPT | Performed by: PEDIATRICS

## 2024-05-20 PROCEDURE — G8419 CALC BMI OUT NRM PARAM NOF/U: HCPCS | Performed by: PEDIATRICS

## 2024-05-20 PROCEDURE — G8399 PT W/DXA RESULTS DOCUMENT: HCPCS | Performed by: PEDIATRICS

## 2024-05-20 PROCEDURE — 99215 OFFICE O/P EST HI 40 MIN: CPT | Performed by: PEDIATRICS

## 2024-05-20 PROCEDURE — 1123F ACP DISCUSS/DSCN MKR DOCD: CPT | Performed by: PEDIATRICS

## 2024-05-20 PROCEDURE — 3080F DIAST BP >= 90 MM HG: CPT | Performed by: PEDIATRICS

## 2024-05-20 PROCEDURE — 3077F SYST BP >= 140 MM HG: CPT | Performed by: PEDIATRICS

## 2024-05-20 RX ORDER — ONDANSETRON 2 MG/ML
8 INJECTION INTRAMUSCULAR; INTRAVENOUS
OUTPATIENT
Start: 2024-06-10

## 2024-05-20 RX ORDER — EPINEPHRINE 1 MG/ML
0.3 INJECTION, SOLUTION, CONCENTRATE INTRAVENOUS PRN
Status: CANCELLED | OUTPATIENT
Start: 2024-05-20

## 2024-05-20 RX ORDER — DIPHENHYDRAMINE HYDROCHLORIDE 50 MG/ML
50 INJECTION INTRAMUSCULAR; INTRAVENOUS
OUTPATIENT
Start: 2024-06-10

## 2024-05-20 RX ORDER — EPINEPHRINE 1 MG/ML
0.3 INJECTION, SOLUTION, CONCENTRATE INTRAVENOUS PRN
OUTPATIENT
Start: 2024-06-10

## 2024-05-20 RX ORDER — ALBUTEROL SULFATE 90 UG/1
4 AEROSOL, METERED RESPIRATORY (INHALATION) PRN
Status: CANCELLED | OUTPATIENT
Start: 2024-05-20

## 2024-05-20 RX ORDER — ACETAMINOPHEN 325 MG/1
650 TABLET ORAL
OUTPATIENT
Start: 2024-06-10

## 2024-05-20 RX ORDER — SODIUM CHLORIDE 9 MG/ML
INJECTION, SOLUTION INTRAVENOUS CONTINUOUS
Status: CANCELLED | OUTPATIENT
Start: 2024-05-20

## 2024-05-20 RX ORDER — ONDANSETRON 2 MG/ML
8 INJECTION INTRAMUSCULAR; INTRAVENOUS
Status: CANCELLED | OUTPATIENT
Start: 2024-05-20

## 2024-05-20 RX ORDER — AZATHIOPRINE 50 MG/1
50 TABLET ORAL 2 TIMES DAILY
Qty: 60 TABLET | Refills: 3 | Status: SHIPPED | OUTPATIENT
Start: 2024-05-20

## 2024-05-20 RX ORDER — ACETAMINOPHEN 325 MG/1
650 TABLET ORAL
Status: CANCELLED | OUTPATIENT
Start: 2024-05-20

## 2024-05-20 RX ORDER — DIPHENHYDRAMINE HYDROCHLORIDE 50 MG/ML
50 INJECTION INTRAMUSCULAR; INTRAVENOUS
Status: CANCELLED | OUTPATIENT
Start: 2024-05-20

## 2024-05-20 RX ORDER — SODIUM CHLORIDE 9 MG/ML
INJECTION, SOLUTION INTRAVENOUS CONTINUOUS
OUTPATIENT
Start: 2024-06-10

## 2024-05-20 RX ORDER — ALBUTEROL SULFATE 90 UG/1
4 AEROSOL, METERED RESPIRATORY (INHALATION) PRN
OUTPATIENT
Start: 2024-06-10

## 2024-05-20 ASSESSMENT — PATIENT HEALTH QUESTIONNAIRE - PHQ9
SUM OF ALL RESPONSES TO PHQ QUESTIONS 1-9: 0
2. FEELING DOWN, DEPRESSED OR HOPELESS: NOT AT ALL
SUM OF ALL RESPONSES TO PHQ QUESTIONS 1-9: 0
SUM OF ALL RESPONSES TO PHQ9 QUESTIONS 1 & 2: 0
2. FEELING DOWN, DEPRESSED OR HOPELESS: NOT AT ALL
SUM OF ALL RESPONSES TO PHQ QUESTIONS 1-9: 0
1. LITTLE INTEREST OR PLEASURE IN DOING THINGS: NOT AT ALL
SUM OF ALL RESPONSES TO PHQ9 QUESTIONS 1 & 2: 0
SUM OF ALL RESPONSES TO PHQ QUESTIONS 1-9: 0
1. LITTLE INTEREST OR PLEASURE IN DOING THINGS: NOT AT ALL

## 2024-05-20 NOTE — PROGRESS NOTES
Chief Complaint   Patient presents with    Joint Pain     1. Have you been to the ER, urgent care clinic since your last visit?  Hospitalized since your last visit?No    2. Have you seen or consulted any other health care providers outside of the [facility] since your last visit?  Include any pap smears or colon screening. No    
Overall the patient effected a better degree of inspiration on today's examination mild areas of bronchiectasis are noted in the lower lobes. This is better seen on this study than on the previous exam and is mild but may have progressed slightly.  3. No adenopathy CT chest (11/11/19): 1. There are peripheral reticular opacities with honeycombing. This is most pronounced anteriorly in each upper lobe left greater than right also involves the lower lobes. These findings are consistent with pulmonary  fibrosis. Pattern  is not typical for UIP. 2. Adenopathy as described above. This could be reactive. Exact etiology is uncertain. Follow-up exam in 3-6 months may yield more information.    Hand X-rays (3/2019): Personally reviewed images. + erosions consistent with RA and severe OA    Foot x-rays (3/2019): Personally reviewed images. + erosions    DEXA (3/2019): T score -2.6 at the left radius     ASSESSMENT  1. Seropositive rheumatoid arthritis / ILD / Fibrosis - The patient has a chest CT on 4/2/24 showing mildly progressed bilateral pulmonary fibrosis. We discussed starting rituximab, but I do not think she needs this strong of medication at this time. Recommend she switch from leflunomide 20 mg daily to azathioprine 50 mg BID. Lab work ordered to be done 6 weeks after switching medication. I recommend she see Dr. Renee (pulmonology) at [facility] and provided her with a referral.   2. Osteoporosis - She will continue Prolia q6 months.      PLAN  1. Switch from Leflunomide 20 mg daily to azathioprine 50 mg BID  2. Referral to Dr. Renee (pulmonology)  3. Prolia q6 months  4. Check CBC, CMP 6 weeks after switching medication  5. Follow up in 3-4 months     Audrey Dillon MD  Adult and Pediatric Rheumatology     [facility]   [address] [place] [id number], Phone [phone], Fax [phone]     Visiting  of Pediatrics    Department of Pediatrics, [place]

## 2024-05-20 NOTE — PROGRESS NOTES
[facility]    Date: May 20, 2024  Name: Elva Guerrero  MRN: [id number]       : 1955  Diagnosis: Osteoporosis (M81.0)   Treatment: Prolia q 26 weeks  Referring Provider: Dr. Dillon  Supervising Provider: Dr. Dillon    Recent dental surgery? NO  Labs WNL for treatment?  YES    Recent labs results:  Lab Results   Component Value Date/Time     2024 10:10 AM    K 3.5 2024 10:10 AM     2024 10:10 AM    CO2 29 2024 10:10 AM    BUN 15 2024 10:10 AM    CREATININE 0.92 2024 10:10 AM    GLUCOSE 114 2024 10:10 AM    GLUCOSE 97 2024 02:08 PM    CALCIUM 11.8 2024 10:10 AM    LABGLOM 67 2024 10:10 AM    LABGLOM 86 2024 02:08 PM    Normal Ca+ per lab standards 8.5-10.1    Prolia 60mg/1ml (0mg drug wasted)  NDC 13067-940-14  Lot #  [id number]  Exp 26    No medication reaction seen in presence of nurse.  Pt tolerated the treatment.  Future lab order placed and future appts scheduled prior to leaving.  Pt discharged in stable condition at ***.  AVS provided unless declined.     Future Appointments   Date Time Provider Department Center   2024  2:00 PM JULIÁN RHEUM NURSE AOCR BS RANDI Simon, RN

## 2024-05-25 LAB — PTH RELATED PROT SERPL-SCNC: <2 PMOL/L

## 2024-06-05 LAB
ALBUMIN SERPL-MCNC: 4.3 G/DL (ref 3.9–4.9)
ALBUMIN/GLOB SERPL: 1.6 {RATIO} (ref 1.2–2.2)
ALP SERPL-CCNC: 52 IU/L (ref 44–121)
ALT SERPL-CCNC: 11 IU/L (ref 0–32)
AST SERPL-CCNC: 23 IU/L (ref 0–40)
BASOPHILS # BLD AUTO: 0.1 X10E3/UL (ref 0–0.2)
BASOPHILS NFR BLD AUTO: 1 %
BILIRUB SERPL-MCNC: 0.5 MG/DL (ref 0–1.2)
BUN SERPL-MCNC: 12 MG/DL (ref 8–27)
BUN/CREAT SERPL: 16 (ref 12–28)
CALCIUM SERPL-MCNC: 11.3 MG/DL (ref 8.7–10.3)
CHLORIDE SERPL-SCNC: 101 MMOL/L (ref 96–106)
CO2 SERPL-SCNC: 25 MMOL/L (ref 20–29)
CREAT SERPL-MCNC: 0.77 MG/DL (ref 0.57–1)
EGFRCR SERPLBLD CKD-EPI 2021: 83 ML/MIN/1.73
EOSINOPHIL # BLD AUTO: 0.3 X10E3/UL (ref 0–0.4)
EOSINOPHIL NFR BLD AUTO: 3 %
ERYTHROCYTE [DISTWIDTH] IN BLOOD BY AUTOMATED COUNT: 10.7 % (ref 11.7–15.4)
GLOBULIN SER CALC-MCNC: 2.7 G/DL (ref 1.5–4.5)
GLUCOSE SERPL-MCNC: 98 MG/DL (ref 70–99)
HCT VFR BLD AUTO: 37.9 % (ref 34–46.6)
HGB BLD-MCNC: 12.2 G/DL (ref 11.1–15.9)
IMM GRANULOCYTES # BLD AUTO: 0 X10E3/UL (ref 0–0.1)
IMM GRANULOCYTES NFR BLD AUTO: 0 %
LYMPHOCYTES # BLD AUTO: 1.9 X10E3/UL (ref 0.7–3.1)
LYMPHOCYTES NFR BLD AUTO: 22 %
MCH RBC QN AUTO: 30.7 PG (ref 26.6–33)
MCHC RBC AUTO-ENTMCNC: 32.2 G/DL (ref 31.5–35.7)
MCV RBC AUTO: 96 FL (ref 79–97)
MONOCYTES # BLD AUTO: 0.6 X10E3/UL (ref 0.1–0.9)
MONOCYTES NFR BLD AUTO: 7 %
NEUTROPHILS # BLD AUTO: 5.5 X10E3/UL (ref 1.4–7)
NEUTROPHILS NFR BLD AUTO: 67 %
PLATELET # BLD AUTO: 421 X10E3/UL (ref 150–450)
POTASSIUM SERPL-SCNC: 4.2 MMOL/L (ref 3.5–5.2)
PROT SERPL-MCNC: 7 G/DL (ref 6–8.5)
RBC # BLD AUTO: 3.97 X10E6/UL (ref 3.77–5.28)
SODIUM SERPL-SCNC: 140 MMOL/L (ref 134–144)
WBC # BLD AUTO: 8.3 X10E3/UL (ref 3.4–10.8)

## 2024-06-17 ENCOUNTER — NURSE ONLY (OUTPATIENT)
Age: 69
End: 2024-06-17
Payer: MEDICARE

## 2024-06-17 VITALS
DIASTOLIC BLOOD PRESSURE: 83 MMHG | WEIGHT: 133 LBS | HEART RATE: 70 BPM | BODY MASS INDEX: 25.97 KG/M2 | OXYGEN SATURATION: 99 % | SYSTOLIC BLOOD PRESSURE: 135 MMHG | TEMPERATURE: 97.5 F | RESPIRATION RATE: 16 BRPM

## 2024-06-17 DIAGNOSIS — M81.0 AGE-RELATED OSTEOPOROSIS WITHOUT CURRENT PATHOLOGICAL FRACTURE: Primary | ICD-10-CM

## 2024-06-17 PROCEDURE — PBSHW PBB SHADOW CHARGE: Performed by: PEDIATRICS

## 2024-06-17 PROCEDURE — 96372 THER/PROPH/DIAG INJ SC/IM: CPT | Performed by: PEDIATRICS

## 2024-06-17 RX ORDER — ONDANSETRON 2 MG/ML
8 INJECTION INTRAMUSCULAR; INTRAVENOUS
OUTPATIENT
Start: 2024-11-18

## 2024-06-17 RX ORDER — EPINEPHRINE 1 MG/ML
0.3 INJECTION, SOLUTION, CONCENTRATE INTRAVENOUS PRN
OUTPATIENT
Start: 2024-11-18

## 2024-06-17 RX ORDER — ACETAMINOPHEN 325 MG/1
650 TABLET ORAL
OUTPATIENT
Start: 2024-11-18

## 2024-06-17 RX ORDER — SODIUM CHLORIDE 9 MG/ML
INJECTION, SOLUTION INTRAVENOUS CONTINUOUS
OUTPATIENT
Start: 2024-11-18

## 2024-06-17 RX ORDER — DIPHENHYDRAMINE HYDROCHLORIDE 50 MG/ML
50 INJECTION INTRAMUSCULAR; INTRAVENOUS
OUTPATIENT
Start: 2024-11-18

## 2024-06-17 RX ORDER — ALBUTEROL SULFATE 90 UG/1
4 AEROSOL, METERED RESPIRATORY (INHALATION) PRN
OUTPATIENT
Start: 2024-11-18

## 2024-06-17 RX ADMIN — DENOSUMAB 60 MG: 60 INJECTION SUBCUTANEOUS at 10:00

## 2024-06-17 ASSESSMENT — PATIENT HEALTH QUESTIONNAIRE - PHQ9
1. LITTLE INTEREST OR PLEASURE IN DOING THINGS: NOT AT ALL
2. FEELING DOWN, DEPRESSED OR HOPELESS: NOT AT ALL
SUM OF ALL RESPONSES TO PHQ QUESTIONS 1-9: 0
SUM OF ALL RESPONSES TO PHQ9 QUESTIONS 1 & 2: 0

## 2024-06-17 ASSESSMENT — PAIN SCALES - GENERAL: PAINLEVEL_OUTOF10: 0

## 2024-06-17 NOTE — PROGRESS NOTES
[facility]    Date: 2024  Name: Elva Guerrero  MRN: [id number]       : 1955  Diagnosis: Osteoporosis (M81.0)   Treatment: Prolia q 26 weeks  Referring Provider: Dr. Dillon  Supervising Provider: Dr. Dillon    Recent dental surgery? NO  Labs WNL for treatment?  YES    Recent labs results:  Lab Results   Component Value Date/Time     2024 02:07 PM    K 4.2 2024 02:07 PM     2024 02:07 PM    CO2 25 2024 02:07 PM    BUN 12 2024 02:07 PM    CREATININE 0.77 2024 02:07 PM    GLUCOSE 98 2024 02:07 PM    CALCIUM 11.3 2024 02:07 PM    LABGLOM 83 2024 02:07 PM    LABGLOM 86 2024 02:08 PM   Normal Ca+ per lab standards 8.5-10.1    Administrations This Visit       denosumab (PROLIA) SC injection 60 mg       Admin Date  2024 Action  Given Dose  60 mg Route  SubCUTAneous Administered By  Nubia Simon RN                Prolia 60mg/1ml (0mg drug wasted)  NDC 19045-162-61  Lot # [id number]  Exp 10/31/26    No medication reaction seen in presence of nurse.  Pt tolerated the treatment.  Future lab order placed and future appts scheduled prior to leaving.  Pt discharged in stable condition at 1010.  AVS provided unless declined.     Future Appointments   Date Time Provider Department Center   2024 10:00 AM Cecelia Chinchilla MD CDE BS AMB   2024  9:00 AM Audrey Dillon MD AOCR BS AMB     Nubia Simon, RN

## 2024-06-17 NOTE — PATIENT INSTRUCTIONS
Thank you for visiting [facility]!      For future medication refills, we require updated lab results and an upcoming appointment to be in our system prior to refilling prescriptions.  Without these two things, your refill will be DENIED.  If you miss your upcoming appointment, your refill will also be DENIED.      We appreciate your understanding of this critical clinical aspect of our practice. -Dr. Dillon & Tesha, NP

## 2024-06-28 ENCOUNTER — OFFICE VISIT (OUTPATIENT)
Age: 69
End: 2024-06-28

## 2024-06-28 VITALS
DIASTOLIC BLOOD PRESSURE: 75 MMHG | HEART RATE: 86 BPM | TEMPERATURE: 97.5 F | BODY MASS INDEX: 24.13 KG/M2 | SYSTOLIC BLOOD PRESSURE: 128 MMHG | WEIGHT: 127.8 LBS | HEIGHT: 61 IN

## 2024-06-28 DIAGNOSIS — E83.52 HYPERCALCEMIA: Primary | ICD-10-CM

## 2024-06-28 DIAGNOSIS — I10 PRIMARY HYPERTENSION: ICD-10-CM

## 2024-06-28 RX ORDER — AMLODIPINE BESYLATE 5 MG/1
5 TABLET ORAL DAILY
Qty: 90 TABLET | Refills: 0 | Status: SHIPPED | OUTPATIENT
Start: 2024-06-28

## 2024-06-28 NOTE — PROGRESS NOTES
Medications   Medication Sig Dispense Refill    amLODIPine (NORVASC) 5 MG tablet Take 1 tablet by mouth daily 90 tablet 0    azaTHIOprine (IMURAN) 50 MG tablet Take 1 tablet by mouth in the morning and at bedtime 60 tablet 3    BREO ELLIPTA 200-25 MCG/ACT AEPB inhaler Inhale 1 puff into the lungs daily 60 each 1    montelukast (SINGULAIR) 10 MG tablet Take 1 tablet by mouth nightly 90 tablet 1    atorvastatin (LIPITOR) 40 MG tablet Take 1 tablet by mouth daily 90 tablet 1    denosumab (PROLIA) 60 MG/ML SOSY SC injection Inject 1 mL into the skin once      diclofenac sodium (VOLTAREN) 1 % GEL Apply 2 g topically 4 times daily as needed for Pain      Vitamin D (CHOLECALCIFEROL) 25 MCG (1000 UT) TABS tablet Take 1 tablet by mouth daily (Patient not taking: Reported on 6/28/2024)      leflunomide (ARAVA) 20 MG tablet Take 1 tablet by mouth daily (Patient not taking: Reported on 6/28/2024) 90 tablet 1     No current facility-administered medications for this visit.        Past Medical History:   Diagnosis Date    Age-related osteoporosis without current pathological fracture 6/17/2021    Hypertension     Osteoporosis     Rheumatoid arthritis (HCC)         No past surgical history on file.     Social History     Tobacco Use    Smoking status: Former     Passive exposure: Past    Smokeless tobacco: Never   Substance Use Topics    Alcohol use: Yes      Employer:  Retired     Family History   Problem Relation Age of Onset    Unknown Mother     No Known Problems Father         PHYSICAL EXAM  Blood pressure 128/75, pulse 86, temperature 97.5 °F (36.4 °C), temperature source Temporal, height 1.549 m (5' 1\"), weight 58 kg (127 lb 12.8 oz).   GENERAL: Well-developed, well-nourished female in no acute distress.  HENT: normocephalic, atraumatic   EYES: EOMI. No lid lag, proptosis, icterus, conjunctival injection, periorbital edema.  THYROID: No thyromegaly, no nodules appreciated  LYMPH: No submandibular, cervical, or

## 2024-07-02 ENCOUNTER — OFFICE VISIT (OUTPATIENT)
Age: 69
End: 2024-07-02
Payer: MEDICARE

## 2024-07-02 VITALS
HEART RATE: 80 BPM | TEMPERATURE: 98 F | DIASTOLIC BLOOD PRESSURE: 76 MMHG | BODY MASS INDEX: 24.51 KG/M2 | OXYGEN SATURATION: 98 % | HEIGHT: 61 IN | WEIGHT: 129.8 LBS | RESPIRATION RATE: 18 BRPM | SYSTOLIC BLOOD PRESSURE: 113 MMHG

## 2024-07-02 DIAGNOSIS — Z12.11 ENCOUNTER FOR SCREENING COLONOSCOPY: Primary | ICD-10-CM

## 2024-07-02 PROCEDURE — 99213 OFFICE O/P EST LOW 20 MIN: CPT

## 2024-07-02 PROCEDURE — 3017F COLORECTAL CA SCREEN DOC REV: CPT

## 2024-07-02 PROCEDURE — G8420 CALC BMI NORM PARAMETERS: HCPCS

## 2024-07-02 PROCEDURE — G8399 PT W/DXA RESULTS DOCUMENT: HCPCS

## 2024-07-02 PROCEDURE — 1036F TOBACCO NON-USER: CPT

## 2024-07-02 PROCEDURE — 1090F PRES/ABSN URINE INCON ASSESS: CPT

## 2024-07-02 PROCEDURE — 1123F ACP DISCUSS/DSCN MKR DOCD: CPT

## 2024-07-02 PROCEDURE — G8427 DOCREV CUR MEDS BY ELIG CLIN: HCPCS

## 2024-07-02 PROCEDURE — 3074F SYST BP LT 130 MM HG: CPT

## 2024-07-02 PROCEDURE — 3078F DIAST BP <80 MM HG: CPT

## 2024-07-02 ASSESSMENT — PATIENT HEALTH QUESTIONNAIRE - PHQ9
SUM OF ALL RESPONSES TO PHQ QUESTIONS 1-9: 0
SUM OF ALL RESPONSES TO PHQ QUESTIONS 1-9: 0
2. FEELING DOWN, DEPRESSED OR HOPELESS: NOT AT ALL
SUM OF ALL RESPONSES TO PHQ QUESTIONS 1-9: 0
SUM OF ALL RESPONSES TO PHQ9 QUESTIONS 1 & 2: 0
1. LITTLE INTEREST OR PLEASURE IN DOING THINGS: NOT AT ALL
SUM OF ALL RESPONSES TO PHQ QUESTIONS 1-9: 0

## 2024-07-02 NOTE — PROGRESS NOTES
[facility]   [address] [place] [address]   Office [phone], Fax [phone]      Chief Complaint:     Chief Complaint   Patient presents with    Follow-up Chronic Condition     Needs a referral for a colonoscopy.            Subjective:   HPI:  Elva Guerrero is a 68 y.o. female that presents with need for referral for colonoscopy.    She was also concerned about her need for antihypertensive medication as her blood pressure level was normal today.     Past medical history, social history, medications, and allergies personally reviewed.      Medications:   Current Outpatient Medications   Medication Sig    azaTHIOprine (IMURAN) 50 MG tablet Take 1 tablet by mouth in the morning and at bedtime    BREO ELLIPTA 200-25 MCG/ACT AEPB inhaler Inhale 1 puff into the lungs daily    montelukast (SINGULAIR) 10 MG tablet Take 1 tablet by mouth nightly    atorvastatin (LIPITOR) 40 MG tablet Take 1 tablet by mouth daily    denosumab (PROLIA) 60 MG/ML SOSY SC injection Inject 1 mL into the skin once    diclofenac sodium (VOLTAREN) 1 % GEL Apply 2 g topically 4 times daily as needed for Pain    amLODIPine (NORVASC) 5 MG tablet Take 1 tablet by mouth daily    Vitamin D (CHOLECALCIFEROL) 25 MCG (1000 UT) TABS tablet Take 1 tablet by mouth daily (Patient not taking: Reported on 6/28/2024)    leflunomide (ARAVA) 20 MG tablet Take 1 tablet by mouth daily (Patient not taking: Reported on 6/28/2024)     No current facility-administered medications for this visit.        Allergies:  Allergies   Allergen Reactions    Seasonal Cough        Health Maintenance:  Health Maintenance Due   Topic Date Due    Breast cancer screen  Never done    Colorectal Cancer Screen  Never done    Respiratory Syncytial Virus (RSV) Pregnant or age 60 yrs+ (1 - 1-dose 60+ series) Never done    COVID-19 Vaccine (4 - 2023-24 season) 09/01/2023    Annual Wellness Visit

## 2024-07-02 NOTE — PROGRESS NOTES
Chief Complaint   Patient presents with    Follow-up Chronic Condition     Needs a referral for a colonoscopy.      Vitals:    07/02/24 1001   BP: 113/76   Site: Right Upper Arm   Position: Sitting   Cuff Size: Medium Adult   Pulse: 80   Resp: 18   Temp: 98 °F (36.7 °C)   TempSrc: Temporal   SpO2: 98%   Weight: 58.9 kg (129 lb 12.8 oz)   Height: 1.549 m (5' 1\")     \"Have you been to the ER, urgent care clinic since your last visit?  Hospitalized since your last visit?\"    NO    “Have you seen or consulted any other health care providers outside of [facility] since your last visit?”    NO    Have you had a mammogram?”   NO    No breast cancer screening on file         “Have you had a colorectal cancer screening such as a colonoscopy/FIT/Cologuard?    NO    No colonoscopy on file  No cologuard on file  No FIT/FOBT on file   No flexible sigmoidoscopy on file         Click Here for Release of Records Request

## 2024-07-03 NOTE — PROGRESS NOTES
[facility] Residency Attending Attestation: I have seen and evaluated the patient, repeating/performing the critical or key elements of the service. I discussed the findings, assessment and plan with the resident and agree with the resident's documentation.    Celio Lin MD, MPH  [facility]

## 2024-07-20 LAB
BUN SERPL-MCNC: 9 MG/DL (ref 8–27)
BUN/CREAT SERPL: 17 (ref 12–28)
CALCIUM SERPL-MCNC: 9.3 MG/DL (ref 8.7–10.3)
CHLORIDE SERPL-SCNC: 107 MMOL/L (ref 96–106)
CO2 SERPL-SCNC: 22 MMOL/L (ref 20–29)
CREAT SERPL-MCNC: 0.52 MG/DL (ref 0.57–1)
EGFRCR SERPLBLD CKD-EPI 2021: 101 ML/MIN/1.73
GLUCOSE SERPL-MCNC: 112 MG/DL (ref 70–99)
POTASSIUM SERPL-SCNC: 4.1 MMOL/L (ref 3.5–5.2)
SODIUM SERPL-SCNC: 143 MMOL/L (ref 134–144)

## 2024-07-21 LAB — PTH-INTACT SERPL-MCNC: 100 PG/ML (ref 15–65)

## 2024-07-24 LAB
IGA SERPL-MCNC: 243 MG/DL (ref 87–352)
IGG SERPL-MCNC: 927 MG/DL (ref 586–1602)
IGM SERPL-MCNC: 55 MG/DL (ref 26–217)
PROT PATTERN SERPL IFE-IMP: NORMAL

## 2024-08-02 ENCOUNTER — OFFICE VISIT (OUTPATIENT)
Age: 69
End: 2024-08-02
Payer: MEDICARE

## 2024-08-02 VITALS
BODY MASS INDEX: 24.51 KG/M2 | SYSTOLIC BLOOD PRESSURE: 146 MMHG | TEMPERATURE: 97.8 F | DIASTOLIC BLOOD PRESSURE: 76 MMHG | HEART RATE: 67 BPM | HEIGHT: 61 IN | OXYGEN SATURATION: 99 % | WEIGHT: 129.8 LBS

## 2024-08-02 DIAGNOSIS — E83.52 HYPERCALCEMIA: Primary | ICD-10-CM

## 2024-08-02 PROCEDURE — 3078F DIAST BP <80 MM HG: CPT | Performed by: INTERNAL MEDICINE

## 2024-08-02 PROCEDURE — G8399 PT W/DXA RESULTS DOCUMENT: HCPCS | Performed by: INTERNAL MEDICINE

## 2024-08-02 PROCEDURE — 1090F PRES/ABSN URINE INCON ASSESS: CPT | Performed by: INTERNAL MEDICINE

## 2024-08-02 PROCEDURE — G8427 DOCREV CUR MEDS BY ELIG CLIN: HCPCS | Performed by: INTERNAL MEDICINE

## 2024-08-02 PROCEDURE — 1123F ACP DISCUSS/DSCN MKR DOCD: CPT | Performed by: INTERNAL MEDICINE

## 2024-08-02 PROCEDURE — G8420 CALC BMI NORM PARAMETERS: HCPCS | Performed by: INTERNAL MEDICINE

## 2024-08-02 PROCEDURE — 1036F TOBACCO NON-USER: CPT | Performed by: INTERNAL MEDICINE

## 2024-08-02 PROCEDURE — 3077F SYST BP >= 140 MM HG: CPT | Performed by: INTERNAL MEDICINE

## 2024-08-02 PROCEDURE — 99214 OFFICE O/P EST MOD 30 MIN: CPT | Performed by: INTERNAL MEDICINE

## 2024-08-02 PROCEDURE — 3017F COLORECTAL CA SCREEN DOC REV: CPT | Performed by: INTERNAL MEDICINE

## 2024-08-02 NOTE — PROGRESS NOTES
Elva Guerrero is a 69 y.o. female here for   Chief Complaint   Patient presents with    Hypercalcemia       1. Have you been to the ER, urgent care clinic since your last visit?  Hospitalized since your last visit? -NO    2. Have you seen or consulted any other health care providers outside of the [facility] since your last visit?  Include any pap smears or colon screening.-NO  '

## 2024-08-02 NOTE — PROGRESS NOTES
Endocrine Follow up     PCP: Celio Lin MD    Chief Complaint   Patient presents with    Hypercalcemia     HPI:  Elva Guerrero is a 69 y.o. female with  has a past medical history of Age-related osteoporosis without current pathological fracture, Hypertension, Osteoporosis, and Rheumatoid arthritis (HCC). who is seen in consultation at the request of Celio Lin MD for evaluation of hypercalcemia.     Following w/rheum for osteoporosis and rheumatoid arthritis   Prior tx includes prednisone  Currently on prolia     Serum ca 11.3, hyperca dating back at least 2019     Latest Reference Range & Units 05/17/24 10:10 06/04/24 14:07   Creatinine 0.57 - 1.00 mg/dL 0.92 0.77   Calcium 8.7 - 10.3 mg/dL 11.8 (H) 11.3 (H)   PTH Related Peptide pmol/L <2.0    Vit D, 1,25-Dihydroxy 24.8 - 81.5 pg/mL 43.4    Vit D, 25-Hydroxy 30 - 100 ng/mL 100.9 (H)      CT chest 4/2024 = no thyroid nodule   DEXA 3/2023 = OPP at L radius     On HCTZ,  Stopped Ca supplements 3 mo ago  Stopped Vit D recently as well   No hx of renal stones     Full ROS completed this visit:  +weight loss, nausea/emesis   frequent urination  Negative for weight gain, fevers, chills, blurry vision, changes in vision, chest pain, palpitations, leg swelling, shortness of breath, wheezing, snoring, abdominal pain, diarrhea, constipation, dysuria, tremors, fainting, shakes, cold intolerance, hot intolerance,  foot sores, rash.    LABS/STUDIES:   No results found for: \"VYH2RFUQ\"  Lab Results   Component Value Date/Time    LABA1C 5.3 12/03/2021 09:00 AM    CREATININE 0.52 07/19/2024 03:01 PM    LABGLOM 101 07/19/2024 03:01 PM    LABGLOM 86 02/14/2024 02:08 PM     Lab Results   Component Value Date/Time    CHOL 136 05/17/2024 10:10 AM    TRIG 100 05/17/2024 10:10 AM    HDL 48 05/17/2024 10:10 AM     Lab Results   Component Value Date/Time    TSH 2.21 12/03/2021 09:00 AM     No results found for: \"CPEPLT\", \"CPEPTIDE\"    Current Outpatient Medications   Medication

## 2024-08-07 DIAGNOSIS — T78.40XA WHEEZING DUE TO ALLERGY: ICD-10-CM

## 2024-08-07 DIAGNOSIS — R06.2 WHEEZING DUE TO ALLERGY: ICD-10-CM

## 2024-08-07 NOTE — TELEPHONE ENCOUNTER
Medication Refill Request    Elva Guerrero is requesting a refill of the following medication(s):   Requested Prescriptions     Pending Prescriptions Disp Refills    BREO ELLIPTA 200-25 MCG/ACT AEPB inhaler 60 each 1     Sig: Inhale 1 puff into the lungs daily        Listed PCP is Celio Lin MD   Last provider to prescribe medication: Nicholas  Last Date of Medication Prescribed: 5/17/2024   Date of Last Office Visit at [facility]: 7/2/2024   FUTURE APPOINTMENT:   Future Appointments   Date Time Provider Department Center   8/27/2024  9:00 AM Audrey Dillon MD AOCR BS AMB   11/8/2024 11:45 AM Cecelia Chinchilla MD CDE BS AMB   12/17/2024 10:30 AM Audrey Dillon MD AOCR BS AMB       Please send refill to:    [facility] - [address] - [facility] - P [phone] - F [phone]  [facility]  [place] [address]  Phone: [phone] Fax: [phone]      Please review request and approve or deny with recommendations.

## 2024-08-08 RX ORDER — FLUTICASONE FUROATE AND VILANTEROL TRIFENATATE 200; 25 UG/1; UG/1
1 POWDER RESPIRATORY (INHALATION) DAILY
Qty: 60 EACH | Refills: 1 | Status: SHIPPED | OUTPATIENT
Start: 2024-08-08

## 2024-08-12 ENCOUNTER — TELEPHONE (OUTPATIENT)
Age: 69
End: 2024-08-12

## 2024-08-12 NOTE — TELEPHONE ENCOUNTER
This RN received transferred call with Nurse Practitioner (NP), Tiara on the line regarding patient's blood pressure.    NP stated that she is currently with patient and blood pressure (BP) is currently 160/89, but this morning home BP was 190/84. Per NP, patient denies any chest pain or shortness of breath; only has \"pressure\" in the head when BP is elevated.    Patient is currently only taking 5 MG of Amlodipine that was started on  06/28/24 in the ER and patient self stopped 25 MG of HCTZ.    NP stated that she informed patient that she can take both HCTZ and Amlodipine together and asked what PCP office would officially like to do.    This nurse spoke with Dr. Howard who agreed that patient can restart the HCTZ, continue checking BP at home, and to follow up with PCP.    NP agreed and verbalized understanding; stating that she will notify patient and restart HCTZ.    Follow-up Appointment Scheduled:  Future Appointments   Date Time Provider Department Center   8/27/2024  9:00 AM Audrey Dillon MD AO BS [facility]   8/27/2024  2:00 PM Celio Lin MD [facility]   11/8/2024 11:45 AM Cecelia Chinchilla MD CDE BS [facility]   12/17/2024 10:30 AM Audrey Dillon MD [facility] BS [facility]      Shad Vogt, ARABELLA

## 2024-09-03 ENCOUNTER — OFFICE VISIT (OUTPATIENT)
Age: 69
End: 2024-09-03
Payer: MEDICARE

## 2024-09-03 VITALS
HEART RATE: 69 BPM | BODY MASS INDEX: 23.56 KG/M2 | HEIGHT: 61 IN | DIASTOLIC BLOOD PRESSURE: 65 MMHG | OXYGEN SATURATION: 97 % | WEIGHT: 124.8 LBS | SYSTOLIC BLOOD PRESSURE: 103 MMHG | TEMPERATURE: 97.6 F

## 2024-09-03 DIAGNOSIS — I10 PRIMARY HYPERTENSION: ICD-10-CM

## 2024-09-03 PROCEDURE — 99213 OFFICE O/P EST LOW 20 MIN: CPT | Performed by: STUDENT IN AN ORGANIZED HEALTH CARE EDUCATION/TRAINING PROGRAM

## 2024-09-03 PROCEDURE — 1123F ACP DISCUSS/DSCN MKR DOCD: CPT | Performed by: STUDENT IN AN ORGANIZED HEALTH CARE EDUCATION/TRAINING PROGRAM

## 2024-09-03 PROCEDURE — 1036F TOBACCO NON-USER: CPT | Performed by: STUDENT IN AN ORGANIZED HEALTH CARE EDUCATION/TRAINING PROGRAM

## 2024-09-03 PROCEDURE — 1090F PRES/ABSN URINE INCON ASSESS: CPT | Performed by: STUDENT IN AN ORGANIZED HEALTH CARE EDUCATION/TRAINING PROGRAM

## 2024-09-03 PROCEDURE — 3074F SYST BP LT 130 MM HG: CPT | Performed by: STUDENT IN AN ORGANIZED HEALTH CARE EDUCATION/TRAINING PROGRAM

## 2024-09-03 PROCEDURE — G8427 DOCREV CUR MEDS BY ELIG CLIN: HCPCS | Performed by: STUDENT IN AN ORGANIZED HEALTH CARE EDUCATION/TRAINING PROGRAM

## 2024-09-03 PROCEDURE — G8420 CALC BMI NORM PARAMETERS: HCPCS | Performed by: STUDENT IN AN ORGANIZED HEALTH CARE EDUCATION/TRAINING PROGRAM

## 2024-09-03 PROCEDURE — 3017F COLORECTAL CA SCREEN DOC REV: CPT | Performed by: STUDENT IN AN ORGANIZED HEALTH CARE EDUCATION/TRAINING PROGRAM

## 2024-09-03 PROCEDURE — G8399 PT W/DXA RESULTS DOCUMENT: HCPCS | Performed by: STUDENT IN AN ORGANIZED HEALTH CARE EDUCATION/TRAINING PROGRAM

## 2024-09-03 PROCEDURE — 3078F DIAST BP <80 MM HG: CPT | Performed by: STUDENT IN AN ORGANIZED HEALTH CARE EDUCATION/TRAINING PROGRAM

## 2024-09-03 RX ORDER — AMLODIPINE BESYLATE 5 MG/1
5 TABLET ORAL DAILY
Qty: 90 TABLET | Refills: 3 | Status: SHIPPED | OUTPATIENT
Start: 2024-09-03 | End: 2025-09-03

## 2024-09-03 SDOH — ECONOMIC STABILITY: FOOD INSECURITY: WITHIN THE PAST 12 MONTHS, YOU WORRIED THAT YOUR FOOD WOULD RUN OUT BEFORE YOU GOT MONEY TO BUY MORE.: NEVER TRUE

## 2024-09-03 SDOH — ECONOMIC STABILITY: INCOME INSECURITY: HOW HARD IS IT FOR YOU TO PAY FOR THE VERY BASICS LIKE FOOD, HOUSING, MEDICAL CARE, AND HEATING?: NOT HARD AT ALL

## 2024-09-03 SDOH — ECONOMIC STABILITY: FOOD INSECURITY: WITHIN THE PAST 12 MONTHS, THE FOOD YOU BOUGHT JUST DIDN'T LAST AND YOU DIDN'T HAVE MONEY TO GET MORE.: NEVER TRUE

## 2024-09-03 ASSESSMENT — PATIENT HEALTH QUESTIONNAIRE - PHQ9
SUM OF ALL RESPONSES TO PHQ9 QUESTIONS 1 & 2: 0
SUM OF ALL RESPONSES TO PHQ QUESTIONS 1-9: 0
2. FEELING DOWN, DEPRESSED OR HOPELESS: NOT AT ALL
SUM OF ALL RESPONSES TO PHQ QUESTIONS 1-9: 0
1. LITTLE INTEREST OR PLEASURE IN DOING THINGS: NOT AT ALL
SUM OF ALL RESPONSES TO PHQ QUESTIONS 1-9: 0
SUM OF ALL RESPONSES TO PHQ QUESTIONS 1-9: 0

## 2024-09-05 NOTE — PROGRESS NOTES
Elva Guerrero is a 69 y.o. female      Chief Complaint   Patient presents with    Blood Pressure Check       \"Have you been to the ER, urgent care clinic since your last visit?  Hospitalized since your last visit?\"    NO    “Have you seen or consulted any other health care providers outside of [facility] since your last visit?”    NO    Have you had a mammogram?”   YES - Where: \"long time ago\" Nurse/CMA to request most recent records if not in the chart    No breast cancer screening on file         “Have you had a colorectal cancer screening such as a colonoscopy/FIT/Cologuard?    YES - Type: Colonoscopy - Where: 10/22/2024 unsure where Nurse/CMA to request most recent records if not in the chart     No colonoscopy on file  No cologuard on file  No FIT/FOBT on file   No flexible sigmoidoscopy on file         Click Here for Release of Records Request    Vitals:    09/03/24 1426   BP: 103/65   Site: Right Upper Arm   Position: Sitting   Cuff Size: Medium Adult   Pulse: 69   Temp: 97.6 °F (36.4 °C)   TempSrc: Oral   SpO2: 97%   Weight: 56.6 kg (124 lb 12.8 oz)   Height: 1.549 m (5' 0.98\")           Medication Reconciliation Completed, changes notes. Please Update medication list.  
    Elva was seen today for blood pressure check.    Diagnoses and all orders for this visit:    Primary hypertension  Stable, doing well. Cont amlo. Discussed disconinuing if Bps are consistently < 120/80, and checking BP at home.  -     amLODIPine (NORVASC) 5 MG tablet; Take 1 tablet by mouth daily            Follow up: 3 mo    Celio Lin MD    We discussed the expected course, resolution and complications of the diagnosis(es) in detail.  Medication risks, benefits, costs, interactions, and alternatives were discussed as indicated.  I advised her to contact the office if her condition worsens, changes or fails to improve as anticipated. She expressed understanding with the diagnosis(es) and plan.

## 2024-09-09 DIAGNOSIS — M05.9 RHEUMATOID ARTHRITIS WITH RHEUMATOID FACTOR, UNSPECIFIED (HCC): Primary | ICD-10-CM

## 2024-09-09 RX ORDER — AZATHIOPRINE 50 MG/1
50 TABLET ORAL 2 TIMES DAILY
Qty: 60 TABLET | Refills: 2 | Status: SHIPPED | OUTPATIENT
Start: 2024-09-09

## 2024-09-23 ENCOUNTER — TELEPHONE (OUTPATIENT)
Age: 69
End: 2024-09-23

## 2024-10-09 ENCOUNTER — OFFICE VISIT (OUTPATIENT)
Age: 69
End: 2024-10-09
Payer: MEDICARE

## 2024-10-09 VITALS
DIASTOLIC BLOOD PRESSURE: 79 MMHG | HEART RATE: 62 BPM | BODY MASS INDEX: 24.01 KG/M2 | WEIGHT: 127 LBS | SYSTOLIC BLOOD PRESSURE: 143 MMHG | RESPIRATION RATE: 16 BRPM | OXYGEN SATURATION: 96 % | TEMPERATURE: 98.3 F

## 2024-10-09 DIAGNOSIS — M05.9 RHEUMATOID ARTHRITIS WITH RHEUMATOID FACTOR, UNSPECIFIED (HCC): ICD-10-CM

## 2024-10-09 PROCEDURE — 1123F ACP DISCUSS/DSCN MKR DOCD: CPT | Performed by: PEDIATRICS

## 2024-10-09 PROCEDURE — 3077F SYST BP >= 140 MM HG: CPT | Performed by: PEDIATRICS

## 2024-10-09 PROCEDURE — G8427 DOCREV CUR MEDS BY ELIG CLIN: HCPCS | Performed by: PEDIATRICS

## 2024-10-09 PROCEDURE — 99214 OFFICE O/P EST MOD 30 MIN: CPT | Performed by: PEDIATRICS

## 2024-10-09 PROCEDURE — 1036F TOBACCO NON-USER: CPT | Performed by: PEDIATRICS

## 2024-10-09 PROCEDURE — 1090F PRES/ABSN URINE INCON ASSESS: CPT | Performed by: PEDIATRICS

## 2024-10-09 PROCEDURE — G8399 PT W/DXA RESULTS DOCUMENT: HCPCS | Performed by: PEDIATRICS

## 2024-10-09 PROCEDURE — 3078F DIAST BP <80 MM HG: CPT | Performed by: PEDIATRICS

## 2024-10-09 PROCEDURE — G8484 FLU IMMUNIZE NO ADMIN: HCPCS | Performed by: PEDIATRICS

## 2024-10-09 PROCEDURE — G8420 CALC BMI NORM PARAMETERS: HCPCS | Performed by: PEDIATRICS

## 2024-10-09 PROCEDURE — 3017F COLORECTAL CA SCREEN DOC REV: CPT | Performed by: PEDIATRICS

## 2024-10-09 RX ORDER — AZATHIOPRINE 50 MG/1
50 TABLET ORAL 2 TIMES DAILY
Qty: 180 TABLET | Refills: 1 | Status: SHIPPED | OUTPATIENT
Start: 2024-10-09

## 2024-10-09 ASSESSMENT — PATIENT HEALTH QUESTIONNAIRE - PHQ9
1. LITTLE INTEREST OR PLEASURE IN DOING THINGS: NOT AT ALL
SUM OF ALL RESPONSES TO PHQ QUESTIONS 1-9: 0
SUM OF ALL RESPONSES TO PHQ QUESTIONS 1-9: 0
SUM OF ALL RESPONSES TO PHQ9 QUESTIONS 1 & 2: 0
SUM OF ALL RESPONSES TO PHQ QUESTIONS 1-9: 0
SUM OF ALL RESPONSES TO PHQ QUESTIONS 1-9: 0
2. FEELING DOWN, DEPRESSED OR HOPELESS: NOT AT ALL

## 2024-10-09 NOTE — PROGRESS NOTES
Chief Complaint   Patient presents with    Joint Pain     1. Have you been to the ER, urgent care clinic since your last visit?  Hospitalized since your last visit?No    2. Have you seen or consulted any other health care providers outside of the [facility] since your last visit?  Include any pap smears or colon screening. No    
89.0%.  T-score: -1.2 .  Z-score: -0.8 .      Total Hip Right: .  Bone mineral density (gm/cm2): 0.818 g/cm?.  % of peak bone mass: 81.0%.  % for age matched controls:  86.0%.  T-score: -1.5 .  Z-score: -1.1 .      Lumbar Spine: L1-L4.  Bone mineral density (gm/cm2): 1.039 g/cm?.  % of peak bone mass: 87.0%.  % for age matched controls: 97.0%.  T-score: -1.3 .  Z-score: -0.3 .      33% Radius Left: .  Bone mineral density (gm/cm2): 0.525 g/cm?.  % of peak bone mass: 74.0%.  % for age matched controls:  82.0%.  T-score: -2.6 .  Z-score: -1.7 .     This patient is osteoporotic using the World Health Organization criteria  As compared to the prior study, there has been an increase in the bone mineral density of the lumbar spine of 5.3%, of the left total hip of 4.7%, and of the right total hip of 3.7%, and no change in the bone mineral density of the left distal third radius.  10 year probability of major osteoporotic fracture: 6.7%.  10 year probability of hip fracture: 0.7%.     CHEST CT (2/17/22): No significant interval change in honeycombing, fibrosis, and bronchiectasis.     CT chest (2/6/21):    1. The lungs demonstrate persistent abnormality bilaterally this is most pronounced in the periphery of the upper lobes anteriorly does involve all lobes. Findings are consistent with peripheral areas of fibrosis and honeycombing. The pattern is similar. There is a fibrosis appears somewhat better defined in some of the honeycomb cysts are slightly larger. This is only minimal change.  2. Overall the patient effected a better degree of inspiration on today's examination mild areas of bronchiectasis are noted in the lower lobes. This is better seen on this study than on the previous exam and is mild but may have progressed slightly.  3. No adenopathy CT chest (11/11/19): 1. There are peripheral reticular opacities with honeycombing. This is most pronounced anteriorly in each upper lobe left greater than right also

## 2024-10-16 ENCOUNTER — TELEPHONE (OUTPATIENT)
Age: 69
End: 2024-10-16

## 2024-10-16 NOTE — TELEPHONE ENCOUNTER
Called Pt to encourage to schedule mammogram. She states that she will do so, but has a lot of other things going on, citing upcoming colonoscopy and other doctor's appointments. States that she will do so soon. Additionally, she needs a followup appointment with me as this was not scheduled prior to leaving her last appointment. She requests a call to se this up. Message sent to  to assist.    Celio Lin MD, MPH  [facility]

## 2024-10-29 LAB
25(OH)D3+25(OH)D2 SERPL-MCNC: 48.3 NG/ML (ref 30–100)
BUN SERPL-MCNC: 15 MG/DL (ref 8–27)
BUN/CREAT SERPL: 21 (ref 12–28)
CALCIUM SERPL-MCNC: 11.3 MG/DL (ref 8.7–10.3)
CHLORIDE SERPL-SCNC: 104 MMOL/L (ref 96–106)
CO2 SERPL-SCNC: 23 MMOL/L (ref 20–29)
CREAT SERPL-MCNC: 0.73 MG/DL (ref 0.57–1)
EGFRCR SERPLBLD CKD-EPI 2021: 88 ML/MIN/1.73
GLUCOSE SERPL-MCNC: 95 MG/DL (ref 70–99)
POTASSIUM SERPL-SCNC: 4.5 MMOL/L (ref 3.5–5.2)
PTH-INTACT SERPL-MCNC: 35 PG/ML (ref 15–65)
SODIUM SERPL-SCNC: 142 MMOL/L (ref 134–144)

## 2024-10-30 LAB
CREAT 24H UR-MRATE: 954 MG/24 HR (ref 800–1800)
CREAT UR-MCNC: 59.6 MG/DL

## 2024-11-07 ENCOUNTER — TELEPHONE (OUTPATIENT)
Age: 69
End: 2024-11-07

## 2024-11-07 DIAGNOSIS — M05.9 RHEUMATOID ARTHRITIS WITH RHEUMATOID FACTOR, UNSPECIFIED (HCC): ICD-10-CM

## 2024-11-07 RX ORDER — AZATHIOPRINE 50 MG/1
50 TABLET ORAL 2 TIMES DAILY
Qty: 180 TABLET | Refills: 2 | Status: SHIPPED | OUTPATIENT
Start: 2024-11-07

## 2024-11-07 NOTE — TELEPHONE ENCOUNTER
PT called and requested a refill on azaTHIOprine and also stated that a authorization is needed before it can be filled. PT requesting call back. PT seen 10/9/24  and had labs on 6/4/24

## 2024-11-07 NOTE — TELEPHONE ENCOUNTER
Reached out to pt  and I recommended 3 other offices the pt can go to, as a new pt, since our doctor is leaving the ministry in December and these are [facility] [phone], [facility] [phone], and [facility] [phone]. pt verbally understood the information i provided. so we will be canceling the up coming  appt the pt has with dr mcgregor.

## 2024-11-07 NOTE — TELEPHONE ENCOUNTER
Last visit 10/09/24  Lab Results   Component Value Date     10/28/2024    K 4.5 10/28/2024     10/28/2024    CO2 23 10/28/2024    BUN 15 10/28/2024    CREATININE 0.73 10/28/2024    GLUCOSE 95 10/28/2024    CALCIUM 11.3 (H) 10/28/2024    BILITOT 0.5 06/04/2024    ALKPHOS 52 06/04/2024    AST 23 06/04/2024    ALT 11 06/04/2024    LABGLOM 88 10/28/2024    GFRAA 82 02/18/2022    AGRATIO 1.6 06/04/2024    GLOB 3.7 05/17/2024     Lab Results   Component Value Date    WBC 8.3 06/04/2024    HGB 12.2 06/04/2024    HCT 37.9 06/04/2024    MCV 96 06/04/2024     06/04/2024

## 2024-11-08 ENCOUNTER — OFFICE VISIT (OUTPATIENT)
Age: 69
End: 2024-11-08
Payer: MEDICARE

## 2024-11-08 VITALS
WEIGHT: 127 LBS | HEART RATE: 68 BPM | HEIGHT: 61 IN | BODY MASS INDEX: 23.98 KG/M2 | TEMPERATURE: 97.6 F | SYSTOLIC BLOOD PRESSURE: 136 MMHG | DIASTOLIC BLOOD PRESSURE: 76 MMHG | OXYGEN SATURATION: 96 %

## 2024-11-08 DIAGNOSIS — E83.52 HYPERCALCEMIA: Primary | ICD-10-CM

## 2024-11-08 PROCEDURE — 3017F COLORECTAL CA SCREEN DOC REV: CPT | Performed by: INTERNAL MEDICINE

## 2024-11-08 PROCEDURE — 1160F RVW MEDS BY RX/DR IN RCRD: CPT | Performed by: INTERNAL MEDICINE

## 2024-11-08 PROCEDURE — 3078F DIAST BP <80 MM HG: CPT | Performed by: INTERNAL MEDICINE

## 2024-11-08 PROCEDURE — 1123F ACP DISCUSS/DSCN MKR DOCD: CPT | Performed by: INTERNAL MEDICINE

## 2024-11-08 PROCEDURE — 1090F PRES/ABSN URINE INCON ASSESS: CPT | Performed by: INTERNAL MEDICINE

## 2024-11-08 PROCEDURE — 1159F MED LIST DOCD IN RCRD: CPT | Performed by: INTERNAL MEDICINE

## 2024-11-08 PROCEDURE — G8399 PT W/DXA RESULTS DOCUMENT: HCPCS | Performed by: INTERNAL MEDICINE

## 2024-11-08 PROCEDURE — 99213 OFFICE O/P EST LOW 20 MIN: CPT | Performed by: INTERNAL MEDICINE

## 2024-11-08 PROCEDURE — G8420 CALC BMI NORM PARAMETERS: HCPCS | Performed by: INTERNAL MEDICINE

## 2024-11-08 PROCEDURE — G8484 FLU IMMUNIZE NO ADMIN: HCPCS | Performed by: INTERNAL MEDICINE

## 2024-11-08 PROCEDURE — 3075F SYST BP GE 130 - 139MM HG: CPT | Performed by: INTERNAL MEDICINE

## 2024-11-08 PROCEDURE — 1036F TOBACCO NON-USER: CPT | Performed by: INTERNAL MEDICINE

## 2024-11-08 PROCEDURE — G8427 DOCREV CUR MEDS BY ELIG CLIN: HCPCS | Performed by: INTERNAL MEDICINE

## 2024-11-08 PROCEDURE — 1126F AMNT PAIN NOTED NONE PRSNT: CPT | Performed by: INTERNAL MEDICINE

## 2024-11-08 NOTE — PROGRESS NOTES
Elva Guerrero is a 69 y.o. female here for   Chief Complaint   Patient presents with    Hypercalcemia       1. Have you been to the ER, urgent care clinic since your last visit?  Hospitalized since your last visit? -NO    2. Have you seen or consulted any other health care providers outside of the [facility] since your last visit?  Include any pap smears or colon screening.-NO      
1    montelukast (SINGULAIR) 10 MG tablet Take 1 tablet by mouth nightly 90 tablet 1    denosumab (PROLIA) 60 MG/ML SOSY SC injection Inject 1 mL into the skin once      diclofenac sodium (VOLTAREN) 1 % GEL Apply 2 g topically 4 times daily as needed for Pain      atorvastatin (LIPITOR) 40 MG tablet Take 1 tablet by mouth daily (Patient not taking: Reported on 10/9/2024) 90 tablet 1     No current facility-administered medications for this visit.        Past Medical History:   Diagnosis Date    Age-related osteoporosis without current pathological fracture 6/17/2021    Hypertension     Osteoarthritis     Osteoporosis     Rheumatoid arthritis (HCC)         No past surgical history on file.     Social History     Tobacco Use    Smoking status: Former     Types: Cigarettes     Passive exposure: Past    Smokeless tobacco: Never   Substance Use Topics    Alcohol use: Not Currently     Alcohol/week: 1.0 standard drink of alcohol      Employer:  Retired     Family History   Problem Relation Age of Onset    Unknown Mother     No Known Problems Father         PHYSICAL EXAM  Blood pressure 136/76, pulse 68, temperature 97.6 °F (36.4 °C), temperature source Temporal, height 1.549 m (5' 0.98\"), weight 57.6 kg (127 lb), SpO2 96%.   GENERAL: Well-developed, well-nourished female in no acute distress.  HENT: normocephalic, atraumatic   EYES: EOMI. No lid lag, proptosis, icterus, conjunctival injection, periorbital edema.  THYROID: No thyromegaly, no nodules appreciated  LYMPH: No submandibular, cervical, or supraclavicular lymphadenopathy.  CARDIO: Regular rate, no LE edema  RESP: Breathing comfortably. No use of accessory muscles.  GI: Soft, nontender, nondistended. No rebound/guarding. No palpable mass.  MSK: no joint swelling hands, no joint swelling or pain of the knee/ankle  NEUROLOGIC: No tremor. Speech coherent, no dysarthria.  PSYCHIATRIC: Pleasant, Normal affect, normal judgment.  SKIN: Normal temperature, no ecchymoses,

## 2024-11-15 ENCOUNTER — TELEPHONE (OUTPATIENT)
Age: 69
End: 2024-11-15

## 2024-11-15 NOTE — TELEPHONE ENCOUNTER
Informed pt of Dr. Chinchilla's note. Pt verbalized understanding with no further questions or concerns at this time.

## 2024-11-15 NOTE — TELEPHONE ENCOUNTER
----- Message from Dr. Cecelia Chinchilla MD sent at 11/15/2024  2:41 PM EST -----  Last calcium was high   Lets repeat in 1 month   Hydrate well  If continued elevation will need to start medication to lower the calcium.

## 2024-12-12 ENCOUNTER — OFFICE VISIT (OUTPATIENT)
Age: 69
End: 2024-12-12
Payer: MEDICARE

## 2024-12-12 VITALS
SYSTOLIC BLOOD PRESSURE: 126 MMHG | DIASTOLIC BLOOD PRESSURE: 80 MMHG | WEIGHT: 130 LBS | HEART RATE: 91 BPM | TEMPERATURE: 98.3 F | BODY MASS INDEX: 24.55 KG/M2 | OXYGEN SATURATION: 95 % | HEIGHT: 61 IN

## 2024-12-12 DIAGNOSIS — Z12.31 BREAST CANCER SCREENING BY MAMMOGRAM: ICD-10-CM

## 2024-12-12 DIAGNOSIS — I10 BENIGN ESSENTIAL HTN: Primary | ICD-10-CM

## 2024-12-12 DIAGNOSIS — Z12.11 COLON CANCER SCREENING: ICD-10-CM

## 2024-12-12 PROCEDURE — 1036F TOBACCO NON-USER: CPT | Performed by: STUDENT IN AN ORGANIZED HEALTH CARE EDUCATION/TRAINING PROGRAM

## 2024-12-12 PROCEDURE — 1090F PRES/ABSN URINE INCON ASSESS: CPT | Performed by: STUDENT IN AN ORGANIZED HEALTH CARE EDUCATION/TRAINING PROGRAM

## 2024-12-12 PROCEDURE — 3074F SYST BP LT 130 MM HG: CPT | Performed by: STUDENT IN AN ORGANIZED HEALTH CARE EDUCATION/TRAINING PROGRAM

## 2024-12-12 PROCEDURE — G8484 FLU IMMUNIZE NO ADMIN: HCPCS | Performed by: STUDENT IN AN ORGANIZED HEALTH CARE EDUCATION/TRAINING PROGRAM

## 2024-12-12 PROCEDURE — 1123F ACP DISCUSS/DSCN MKR DOCD: CPT | Performed by: STUDENT IN AN ORGANIZED HEALTH CARE EDUCATION/TRAINING PROGRAM

## 2024-12-12 PROCEDURE — 99213 OFFICE O/P EST LOW 20 MIN: CPT | Performed by: STUDENT IN AN ORGANIZED HEALTH CARE EDUCATION/TRAINING PROGRAM

## 2024-12-12 PROCEDURE — G8420 CALC BMI NORM PARAMETERS: HCPCS | Performed by: STUDENT IN AN ORGANIZED HEALTH CARE EDUCATION/TRAINING PROGRAM

## 2024-12-12 PROCEDURE — G8399 PT W/DXA RESULTS DOCUMENT: HCPCS | Performed by: STUDENT IN AN ORGANIZED HEALTH CARE EDUCATION/TRAINING PROGRAM

## 2024-12-12 PROCEDURE — 3079F DIAST BP 80-89 MM HG: CPT | Performed by: STUDENT IN AN ORGANIZED HEALTH CARE EDUCATION/TRAINING PROGRAM

## 2024-12-12 PROCEDURE — G8427 DOCREV CUR MEDS BY ELIG CLIN: HCPCS | Performed by: STUDENT IN AN ORGANIZED HEALTH CARE EDUCATION/TRAINING PROGRAM

## 2024-12-12 PROCEDURE — 1159F MED LIST DOCD IN RCRD: CPT | Performed by: STUDENT IN AN ORGANIZED HEALTH CARE EDUCATION/TRAINING PROGRAM

## 2024-12-12 PROCEDURE — 3017F COLORECTAL CA SCREEN DOC REV: CPT | Performed by: STUDENT IN AN ORGANIZED HEALTH CARE EDUCATION/TRAINING PROGRAM

## 2024-12-12 NOTE — PROGRESS NOTES
Elva Guerrero is a 69 y.o. female      Chief Complaint   Patient presents with    Follow-up     For blood pressure.         \"Have you been to the ER, urgent care clinic since your last visit?  Hospitalized since your last visit?\"    NO    “Have you seen or consulted any other health care providers outside of [facility] since your last visit?”    NO        “Have you had a colorectal cancer screening such as a colonoscopy/FIT/Cologuard?    No    No colonoscopy on file  No cologuard on file  No FIT/FOBT on file   No flexible sigmoidoscopy on file         Click Here for Release of Records Request    Vitals:    12/12/24 1409   BP: 126/80   Site: Right Upper Arm   Position: Sitting   Cuff Size: Medium Adult   Pulse: 91   Temp: 98.3 °F (36.8 °C)   TempSrc: Oral   SpO2: 95%   Weight: 59 kg (130 lb)   Height: 1.549 m (5' 0.98\")           Medication Reconciliation Completed, changes notes. Please Update medication list.

## 2024-12-12 NOTE — PROGRESS NOTES
[facility] Clinic Note      History of Present Illness:     Chief Complaint   Patient presents with    Follow-up     For blood pressure.         Elva Guerrero is a 69 y.o. female with a PMH notable for HTN who presents for followup.    #HTN - checking at home, usually around 117-120/80's. Has stopped taking amlodipine most days. Would like to only take when BP is elevated.    #Hmx  - CRC: was scheduled for October 22, but it was canceled. She was told they would reschedule and they never called. She called them and was told they would her back but they never.      PMH (REVIEWED):  Past Medical History:   Diagnosis Date    Age-related osteoporosis without current pathological fracture 6/17/2021    Hypertension     Osteoarthritis     Osteoporosis     Rheumatoid arthritis (HCC)        Current Medications/Allergies (REVIEWED):     Current Outpatient Medications on File Prior to Visit   Medication Sig Dispense Refill    azaTHIOprine (IMURAN) 50 MG tablet Take 1 tablet by mouth in the morning and at bedtime 180 tablet 2    amLODIPine (NORVASC) 5 MG tablet Take 1 tablet by mouth daily 90 tablet 3    BREO ELLIPTA 200-25 MCG/ACT AEPB inhaler Inhale 1 puff into the lungs daily 60 each 1    denosumab (PROLIA) 60 MG/ML SOSY SC injection Inject 1 mL into the skin once      diclofenac sodium (VOLTAREN) 1 % GEL Apply 2 g topically 4 times daily as needed for Pain      montelukast (SINGULAIR) 10 MG tablet Take 1 tablet by mouth nightly (Patient not taking: Reported on 12/12/2024) 90 tablet 1    atorvastatin (LIPITOR) 40 MG tablet Take 1 tablet by mouth daily (Patient not taking: Reported on 10/9/2024) 90 tablet 1     No current facility-administered medications on file prior to visit.       Allergies   Allergen Reactions    Seasonal Cough         Review of Systems:     Review of Systems as per HPI    Objective:     Vitals:    12/12/24 1409   BP: 126/80   Site: Right Upper Arm   Position: Sitting   Cuff Size:

## 2024-12-17 DIAGNOSIS — M81.0 AGE-RELATED OSTEOPOROSIS WITHOUT CURRENT PATHOLOGICAL FRACTURE: Primary | ICD-10-CM

## 2024-12-17 DIAGNOSIS — M05.79 RHEUMATOID ARTHRITIS INVOLVING MULTIPLE SITES WITH POSITIVE RHEUMATOID FACTOR (HCC): ICD-10-CM

## 2024-12-17 LAB
COMMENT:: NORMAL
SPECIMEN HOLD: NORMAL

## 2024-12-18 ENCOUNTER — TELEPHONE (OUTPATIENT)
Age: 69
End: 2024-12-18

## 2024-12-18 LAB
ANION GAP SERPL CALC-SCNC: 8 MMOL/L (ref 2–12)
BUN SERPL-MCNC: 17 MG/DL (ref 6–20)
BUN/CREAT SERPL: 23 (ref 12–20)
CALCIUM SERPL-MCNC: 10.9 MG/DL (ref 8.5–10.1)
CHLORIDE SERPL-SCNC: 107 MMOL/L (ref 97–108)
CO2 SERPL-SCNC: 26 MMOL/L (ref 21–32)
CREAT SERPL-MCNC: 0.74 MG/DL (ref 0.55–1.02)
GLUCOSE SERPL-MCNC: 109 MG/DL (ref 65–100)
MAGNESIUM SERPL-MCNC: 2.3 MG/DL (ref 1.6–2.4)
PHOSPHATE SERPL-MCNC: 3.7 MG/DL (ref 2.6–4.7)
POTASSIUM SERPL-SCNC: 4.5 MMOL/L (ref 3.5–5.1)
SODIUM SERPL-SCNC: 141 MMOL/L (ref 136–145)

## 2024-12-19 ENCOUNTER — TELEPHONE (OUTPATIENT)
Age: 69
End: 2024-12-19

## 2024-12-31 ENCOUNTER — NURSE ONLY (OUTPATIENT)
Age: 69
End: 2024-12-31
Payer: MEDICARE

## 2024-12-31 VITALS
HEART RATE: 55 BPM | TEMPERATURE: 97.5 F | RESPIRATION RATE: 16 BRPM | OXYGEN SATURATION: 100 % | DIASTOLIC BLOOD PRESSURE: 78 MMHG | SYSTOLIC BLOOD PRESSURE: 148 MMHG

## 2024-12-31 DIAGNOSIS — M81.0 AGE-RELATED OSTEOPOROSIS WITHOUT CURRENT PATHOLOGICAL FRACTURE: Primary | ICD-10-CM

## 2024-12-31 PROCEDURE — 96372 THER/PROPH/DIAG INJ SC/IM: CPT | Performed by: PEDIATRICS

## 2024-12-31 PROCEDURE — PBSHW PBB SHADOW CHARGE: Performed by: PEDIATRICS

## 2024-12-31 RX ORDER — EPINEPHRINE 1 MG/ML
0.3 INJECTION, SOLUTION, CONCENTRATE INTRAVENOUS PRN
OUTPATIENT
Start: 2025-05-13

## 2024-12-31 RX ORDER — SODIUM CHLORIDE 9 MG/ML
INJECTION, SOLUTION INTRAVENOUS CONTINUOUS
OUTPATIENT
Start: 2025-05-13

## 2024-12-31 RX ORDER — FAMOTIDINE 10 MG/ML
20 INJECTION, SOLUTION INTRAVENOUS
OUTPATIENT
Start: 2025-05-13

## 2024-12-31 RX ORDER — HYDROCORTISONE SODIUM SUCCINATE 100 MG/2ML
100 INJECTION INTRAMUSCULAR; INTRAVENOUS
OUTPATIENT
Start: 2025-05-13

## 2024-12-31 RX ORDER — ONDANSETRON 2 MG/ML
8 INJECTION INTRAMUSCULAR; INTRAVENOUS
OUTPATIENT
Start: 2025-05-13

## 2024-12-31 RX ORDER — ACETAMINOPHEN 325 MG/1
650 TABLET ORAL
OUTPATIENT
Start: 2025-05-13

## 2024-12-31 RX ORDER — ALBUTEROL SULFATE 90 UG/1
4 INHALANT RESPIRATORY (INHALATION) PRN
OUTPATIENT
Start: 2025-05-13

## 2024-12-31 RX ORDER — DIPHENHYDRAMINE HYDROCHLORIDE 50 MG/ML
50 INJECTION INTRAMUSCULAR; INTRAVENOUS
OUTPATIENT
Start: 2025-05-13

## 2024-12-31 RX ADMIN — DENOSUMAB 60 MG: 60 INJECTION SUBCUTANEOUS at 09:27

## 2024-12-31 ASSESSMENT — PAIN SCALES - GENERAL: PAINLEVEL_OUTOF10: 0

## 2024-12-31 NOTE — PROGRESS NOTES
[facility]    Date: 2024  Name: Elva Guerrero  MRN: [id number]       : 1955  Diagnosis: Osteoporosis (M81.0)   Treatment: Prolia q 26 weeks  Referring Provider: Dr. Audrey Dillon  Supervising Provider: Dr. Audrey Dillon    Recent dental surgery? NO  Labs WNL for treatment?  YES    Recent labs results:  Lab Results   Component Value Date/Time     2024 10:49 AM    K 4.5 2024 10:49 AM     2024 10:49 AM    CO2 26 2024 10:49 AM    BUN 17 2024 10:49 AM    CREATININE 0.74 2024 10:49 AM    GLUCOSE 109 2024 10:49 AM    GLUCOSE 95 10/28/2024 01:27 PM    CALCIUM 10.9 2024 10:49 AM    LABGLOM 87 2024 10:49 AM    LABGLOM 86 2024 02:08 PM      Normal Ca+ per lab standards 8.5-10.1    Administrations This Visit       denosumab (PROLIA) SC injection 60 mg       Admin Date  2024 Action  Given Dose  60 mg Route  SubCUTAneous Documented By  Nubia Simon, ARABELLA                Prolia 60mg/1ml (0mg drug wasted)  NDC 54686-932-75  Lot #  [id number]  Exp 27    No medication reaction seen in presence of nurse.  Pt tolerated the treatment. Pt discharged in stable condition at 0930.  AVS provided unless declined.     Future Appointments   Date Time Provider Department Center   2024  9:30 AM [facility] AMB   2025 11:45 AM Cecelia Chinchilla MD [facility] AMB     Nubia Simon, RN

## 2025-01-27 DIAGNOSIS — T78.40XA WHEEZING DUE TO ALLERGY: ICD-10-CM

## 2025-01-27 DIAGNOSIS — R06.2 WHEEZING DUE TO ALLERGY: ICD-10-CM

## 2025-01-27 RX ORDER — FLUTICASONE FUROATE AND VILANTEROL TRIFENATATE 200; 25 UG/1; UG/1
1 POWDER RESPIRATORY (INHALATION) DAILY
Qty: 60 EACH | Refills: 1 | Status: SHIPPED | OUTPATIENT
Start: 2025-01-27

## 2025-01-27 NOTE — TELEPHONE ENCOUNTER
Patient is requesting a requesting a refill for thr following medication:    BREO ELLIPTA 200-25 MCG/ACT AEPB inhaler    [facility].    [id number] [address] - P [phone] - F [phone] [[id number]]

## 2025-01-27 NOTE — TELEPHONE ENCOUNTER
Medication Refill Request    Elva Guerrero is requesting a refill of the following medication(s):   Requested Prescriptions     Pending Prescriptions Disp Refills    BREO ELLIPTA 200-25 MCG/ACT AEPB inhaler 60 each 1     Sig: Inhale 1 puff into the lungs daily        Listed PCP is Celio Lin MD   Last /provider to prescribe medication: Riley  Last Date of Medication Prescribed: 8/8/24   Date of Last Office Visit at [facility]: 12/12/24   FUTURE APPOINTMENT:   Future Appointments   Date Time Provider Department Center   2/12/2025 11:45 AM Cecelia Chinchilla MD [facility] AMB       Please send refill to:    [id number] [place], [place] - [address] - P [phone] - F [phone] [address]  [facility] [address]  Phone: [phone] Fax: [phone]    Please review request and approve or deny with recommendations.

## 2025-02-24 ENCOUNTER — OFFICE VISIT (OUTPATIENT)
Age: 70
End: 2025-02-24

## 2025-02-24 VITALS
SYSTOLIC BLOOD PRESSURE: 130 MMHG | BODY MASS INDEX: 26.58 KG/M2 | OXYGEN SATURATION: 98 % | HEART RATE: 67 BPM | TEMPERATURE: 98 F | WEIGHT: 135.4 LBS | HEIGHT: 60 IN | DIASTOLIC BLOOD PRESSURE: 72 MMHG

## 2025-02-24 DIAGNOSIS — E83.52 HYPERCALCEMIA: Primary | ICD-10-CM

## 2025-02-24 NOTE — PROGRESS NOTES
Elva Guerrero is a 69 y.o. female here for   Chief Complaint   Patient presents with    Hypercalcemia       1. Have you been to the ER, urgent care clinic since your last visit?  Hospitalized since your last visit? -no    2. Have you seen or consulted any other health care providers outside of the [facility] since your last visit?  Include any pap smears or colon screening.-no

## 2025-03-04 ENCOUNTER — HOSPITAL ENCOUNTER (OUTPATIENT)
Facility: HOSPITAL | Age: 70
Discharge: HOME | End: 2025-03-07
Attending: INTERNAL MEDICINE
Payer: MEDICARE

## 2025-03-04 DIAGNOSIS — E83.52 HYPERCALCEMIA: ICD-10-CM

## 2025-03-04 PROCEDURE — A9500 TC99M SESTAMIBI: HCPCS | Performed by: INTERNAL MEDICINE

## 2025-03-04 PROCEDURE — 78070 PARATHYROID PLANAR IMAGING: CPT

## 2025-03-04 PROCEDURE — 3430000000 HC RX DIAGNOSTIC RADIOPHARMACEUTICAL: Performed by: INTERNAL MEDICINE

## 2025-03-04 RX ORDER — TETRAKIS(2-METHOXYISOBUTYLISOCYANIDE)COPPER(I) TETRAFLUOROBORATE 1 MG/ML
19.2 INJECTION, POWDER, LYOPHILIZED, FOR SOLUTION INTRAVENOUS ONCE
Status: COMPLETED | OUTPATIENT
Start: 2025-03-04 | End: 2025-03-04

## 2025-03-04 RX ADMIN — TETRAKIS(2-METHOXYISOBUTYLISOCYANIDE)COPPER(I) TETRAFLUOROBORATE 19.2 MILLICURIE: 1 INJECTION, POWDER, LYOPHILIZED, FOR SOLUTION INTRAVENOUS at 10:31

## 2025-03-13 LAB
CALCIUM 24H UR-MCNC: 3.3 MG/DL
CALCIUM 24H UR-MRATE: NORMAL MG/24 HR (ref 0–320)

## 2025-03-26 ENCOUNTER — OFFICE VISIT (OUTPATIENT)
Age: 70
End: 2025-03-26
Payer: MEDICARE

## 2025-03-26 VITALS
BODY MASS INDEX: 26.17 KG/M2 | HEIGHT: 60 IN | OXYGEN SATURATION: 100 % | DIASTOLIC BLOOD PRESSURE: 70 MMHG | TEMPERATURE: 98.1 F | HEART RATE: 74 BPM | SYSTOLIC BLOOD PRESSURE: 108 MMHG | WEIGHT: 133.3 LBS

## 2025-03-26 DIAGNOSIS — E21.3 HYPERPARATHYROIDISM: ICD-10-CM

## 2025-03-26 DIAGNOSIS — E83.52 HYPERCALCEMIA: Primary | ICD-10-CM

## 2025-03-26 DIAGNOSIS — E83.52 HYPERCALCEMIA: ICD-10-CM

## 2025-03-26 LAB
25(OH)D3 SERPL-MCNC: 26.2 NG/ML (ref 30–100)
ANION GAP SERPL CALC-SCNC: 6 MMOL/L (ref 2–12)
BUN SERPL-MCNC: 13 MG/DL (ref 6–20)
BUN/CREAT SERPL: 19 (ref 12–20)
CALCIUM SERPL-MCNC: 10.2 MG/DL (ref 8.5–10.1)
CALCIUM SERPL-MCNC: 10.2 MG/DL (ref 8.5–10.1)
CHLORIDE SERPL-SCNC: 108 MMOL/L (ref 97–108)
CO2 SERPL-SCNC: 26 MMOL/L (ref 21–32)
CREAT SERPL-MCNC: 0.67 MG/DL (ref 0.55–1.02)
GLUCOSE SERPL-MCNC: 114 MG/DL (ref 65–100)
POTASSIUM SERPL-SCNC: 4.3 MMOL/L (ref 3.5–5.1)
PTH-INTACT SERPL-MCNC: 142.9 PG/ML (ref 18.4–88)
SODIUM SERPL-SCNC: 140 MMOL/L (ref 136–145)

## 2025-03-26 PROCEDURE — 99214 OFFICE O/P EST MOD 30 MIN: CPT | Performed by: INTERNAL MEDICINE

## 2025-03-26 PROCEDURE — G8428 CUR MEDS NOT DOCUMENT: HCPCS | Performed by: INTERNAL MEDICINE

## 2025-03-26 PROCEDURE — 1090F PRES/ABSN URINE INCON ASSESS: CPT | Performed by: INTERNAL MEDICINE

## 2025-03-26 PROCEDURE — G8399 PT W/DXA RESULTS DOCUMENT: HCPCS | Performed by: INTERNAL MEDICINE

## 2025-03-26 PROCEDURE — 3078F DIAST BP <80 MM HG: CPT | Performed by: INTERNAL MEDICINE

## 2025-03-26 PROCEDURE — 1123F ACP DISCUSS/DSCN MKR DOCD: CPT | Performed by: INTERNAL MEDICINE

## 2025-03-26 PROCEDURE — 1036F TOBACCO NON-USER: CPT | Performed by: INTERNAL MEDICINE

## 2025-03-26 PROCEDURE — 3074F SYST BP LT 130 MM HG: CPT | Performed by: INTERNAL MEDICINE

## 2025-03-26 PROCEDURE — G8419 CALC BMI OUT NRM PARAM NOF/U: HCPCS | Performed by: INTERNAL MEDICINE

## 2025-03-26 PROCEDURE — 3017F COLORECTAL CA SCREEN DOC REV: CPT | Performed by: INTERNAL MEDICINE

## 2025-03-26 PROCEDURE — 1126F AMNT PAIN NOTED NONE PRSNT: CPT | Performed by: INTERNAL MEDICINE

## 2025-03-26 NOTE — PROGRESS NOTES
Elva Guerrero is a 69 y.o. female here for   Chief Complaint   Patient presents with    Hypercalcemia       1. Have you been to the ER, urgent care clinic since your last visit?  Hospitalized since your last visit? -no    2. Have you seen or consulted any other health care providers outside of the [facility] since your last visit?  Include any pap smears or colon screening.-no      
amLODIPine (NORVASC) 5 MG tablet Take 1 tablet by mouth daily 90 tablet 3    montelukast (SINGULAIR) 10 MG tablet Take 1 tablet by mouth nightly 90 tablet 1    denosumab (PROLIA) 60 MG/ML SOSY SC injection Inject 1 mL into the skin once      diclofenac sodium (VOLTAREN) 1 % GEL Apply 2 g topically 4 times daily as needed for Pain      atorvastatin (LIPITOR) 40 MG tablet Take 1 tablet by mouth daily (Patient not taking: Reported on 3/26/2025) 90 tablet 1     No current facility-administered medications for this visit.        Past Medical History:   Diagnosis Date    Age-related osteoporosis without current pathological fracture 6/17/2021    Hypertension     Osteoarthritis     Osteoporosis     Rheumatoid arthritis (HCC)         No past surgical history on file.     Social History     Tobacco Use    Smoking status: Former     Average packs/day: 0.3 packs/day for 4.0 years (1.0 ttl pk-yrs)     Types: Cigarettes     Start date: 1/1/2006     Passive exposure: Past    Smokeless tobacco: Never   Substance Use Topics    Alcohol use: Not Currently     Alcohol/week: 1.0 standard drink of alcohol      Employer:  Retired     Family History   Problem Relation Age of Onset    Unknown Mother     No Known Problems Father         PHYSICAL EXAM  Blood pressure 108/70, pulse 74, temperature 98.1 °F (36.7 °C), temperature source Temporal, height 1.524 m (5'), weight 60.5 kg (133 lb 4.8 oz), SpO2 100%.   GENERAL: Well-developed, well-nourished female in no acute distress.  HENT: normocephalic, atraumatic   EYES: EOMI. No lid lag, proptosis, icterus, conjunctival injection, periorbital edema.  THYROID: No thyromegaly, no nodules appreciated  LYMPH: No submandibular, cervical, or supraclavicular lymphadenopathy.  CARDIO: Regular rate, no LE edema  RESP: Breathing comfortably. No use of accessory muscles.  GI: Soft, nontender, nondistended. No rebound/guarding. No palpable mass.  MSK: no joint swelling hands, no joint swelling or pain of

## 2025-03-31 ENCOUNTER — RESULTS FOLLOW-UP (OUTPATIENT)
Age: 70
End: 2025-03-31

## 2025-04-03 SDOH — HEALTH STABILITY: PHYSICAL HEALTH: ON AVERAGE, HOW MANY DAYS PER WEEK DO YOU ENGAGE IN MODERATE TO STRENUOUS EXERCISE (LIKE A BRISK WALK)?: 3 DAYS

## 2025-04-03 SDOH — HEALTH STABILITY: PHYSICAL HEALTH: ON AVERAGE, HOW MANY MINUTES DO YOU ENGAGE IN EXERCISE AT THIS LEVEL?: 130 MIN

## 2025-04-03 ASSESSMENT — LIFESTYLE VARIABLES
HOW OFTEN DO YOU HAVE A DRINK CONTAINING ALCOHOL: 2-4 TIMES A MONTH
HOW OFTEN DO YOU HAVE SIX OR MORE DRINKS ON ONE OCCASION: 1
HOW OFTEN DO YOU HAVE A DRINK CONTAINING ALCOHOL: 3
HOW MANY STANDARD DRINKS CONTAINING ALCOHOL DO YOU HAVE ON A TYPICAL DAY: 1
HOW MANY STANDARD DRINKS CONTAINING ALCOHOL DO YOU HAVE ON A TYPICAL DAY: 1 OR 2

## 2025-04-03 ASSESSMENT — PATIENT HEALTH QUESTIONNAIRE - PHQ9
2. FEELING DOWN, DEPRESSED OR HOPELESS: NOT AT ALL
SUM OF ALL RESPONSES TO PHQ QUESTIONS 1-9: 0
1. LITTLE INTEREST OR PLEASURE IN DOING THINGS: NOT AT ALL
SUM OF ALL RESPONSES TO PHQ QUESTIONS 1-9: 0

## 2025-04-04 ENCOUNTER — TELEPHONE (OUTPATIENT)
Age: 70
End: 2025-04-04

## 2025-04-04 NOTE — TELEPHONE ENCOUNTER
Patient came in to request a refill for BREO ELLIPTA 200-25 MCG/ACT AEPB inhaler. She states has 4 puffs left.    I verified that she is still using [facility] at [facility].

## 2025-04-07 SDOH — ECONOMIC STABILITY: FOOD INSECURITY: WITHIN THE PAST 12 MONTHS, YOU WORRIED THAT YOUR FOOD WOULD RUN OUT BEFORE YOU GOT MONEY TO BUY MORE.: NEVER TRUE

## 2025-04-07 SDOH — ECONOMIC STABILITY: INCOME INSECURITY: IN THE LAST 12 MONTHS, WAS THERE A TIME WHEN YOU WERE NOT ABLE TO PAY THE MORTGAGE OR RENT ON TIME?: NO

## 2025-04-07 SDOH — ECONOMIC STABILITY: FOOD INSECURITY: WITHIN THE PAST 12 MONTHS, THE FOOD YOU BOUGHT JUST DIDN'T LAST AND YOU DIDN'T HAVE MONEY TO GET MORE.: NEVER TRUE

## 2025-04-07 SDOH — ECONOMIC STABILITY: TRANSPORTATION INSECURITY
IN THE PAST 12 MONTHS, HAS THE LACK OF TRANSPORTATION KEPT YOU FROM MEDICAL APPOINTMENTS OR FROM GETTING MEDICATIONS?: NO

## 2025-04-09 NOTE — TELEPHONE ENCOUNTER
Faxed to pharmacy paper refill request for Breo Ellipta 200-25mcg oral INH 3 refills to [facility] on [address].

## 2025-04-10 ENCOUNTER — OFFICE VISIT (OUTPATIENT)
Age: 70
End: 2025-04-10
Payer: MEDICARE

## 2025-04-10 VITALS
WEIGHT: 133.4 LBS | HEIGHT: 60 IN | BODY MASS INDEX: 26.19 KG/M2 | TEMPERATURE: 98.1 F | HEART RATE: 71 BPM | DIASTOLIC BLOOD PRESSURE: 63 MMHG | SYSTOLIC BLOOD PRESSURE: 99 MMHG | OXYGEN SATURATION: 97 %

## 2025-04-10 DIAGNOSIS — M05.9 RHEUMATOID ARTHRITIS WITH RHEUMATOID FACTOR, UNSPECIFIED (HCC): ICD-10-CM

## 2025-04-10 DIAGNOSIS — T78.40XA WHEEZING DUE TO ALLERGY: ICD-10-CM

## 2025-04-10 DIAGNOSIS — R06.2 WHEEZING DUE TO ALLERGY: ICD-10-CM

## 2025-04-10 DIAGNOSIS — J84.9 ILD (INTERSTITIAL LUNG DISEASE) (HCC): ICD-10-CM

## 2025-04-10 DIAGNOSIS — Z00.00 INITIAL MEDICARE ANNUAL WELLNESS VISIT: Primary | ICD-10-CM

## 2025-04-10 PROCEDURE — 3078F DIAST BP <80 MM HG: CPT | Performed by: STUDENT IN AN ORGANIZED HEALTH CARE EDUCATION/TRAINING PROGRAM

## 2025-04-10 PROCEDURE — 1159F MED LIST DOCD IN RCRD: CPT | Performed by: STUDENT IN AN ORGANIZED HEALTH CARE EDUCATION/TRAINING PROGRAM

## 2025-04-10 PROCEDURE — 1123F ACP DISCUSS/DSCN MKR DOCD: CPT | Performed by: STUDENT IN AN ORGANIZED HEALTH CARE EDUCATION/TRAINING PROGRAM

## 2025-04-10 PROCEDURE — 3074F SYST BP LT 130 MM HG: CPT | Performed by: STUDENT IN AN ORGANIZED HEALTH CARE EDUCATION/TRAINING PROGRAM

## 2025-04-10 PROCEDURE — G0438 PPPS, INITIAL VISIT: HCPCS | Performed by: STUDENT IN AN ORGANIZED HEALTH CARE EDUCATION/TRAINING PROGRAM

## 2025-04-10 PROCEDURE — 3017F COLORECTAL CA SCREEN DOC REV: CPT | Performed by: STUDENT IN AN ORGANIZED HEALTH CARE EDUCATION/TRAINING PROGRAM

## 2025-04-10 RX ORDER — FLUTICASONE FUROATE AND VILANTEROL TRIFENATATE 200; 25 UG/1; UG/1
1 POWDER RESPIRATORY (INHALATION) DAILY
Qty: 60 EACH | Refills: 1 | Status: SHIPPED | OUTPATIENT
Start: 2025-04-10

## 2025-04-10 NOTE — PROGRESS NOTES
Medicare Annual Wellness Visit    Elva Guerrero is here for Medicare AWV (Arthritis pain of knees. Takes amlodipine if needed.)    Assessment & Plan   Initial Medicare annual wellness visit  Rheumatoid arthritis with rheumatoid factor, unspecified (HCC)  ILD (interstitial lung disease) (HCC)       No follow-ups on file.     Subjective       Patient's complete Health Risk Assessment and screening values have been reviewed and are found in Flowsheets. The following problems were reviewed today and where indicated follow up appointments were made and/or referrals ordered.    Positive Risk Factor Screenings with Interventions:      Interventions:  See AVS for additional education material                                  Objective   Vitals:    04/10/25 1047   BP: 99/63   BP Site: Right Upper Arm   Patient Position: Sitting   BP Cuff Size: Medium Adult   Pulse: 71   Temp: 98.1 °F (36.7 °C)   TempSrc: Oral   SpO2: 97%   Weight: 60.5 kg (133 lb 6.4 oz)   Height: 1.524 m (5')      Body mass index is 26.05 kg/m².                    Allergies   Allergen Reactions    Seasonal Cough     Prior to Visit Medications    Medication Sig Taking? Authorizing Provider   DEACON ELLIPTA 200-25 MCG/ACT AEPB inhaler Inhale 1 puff into the lungs daily Yes Celio Lin MD   azaTHIOprine (IMURAN) 50 MG tablet Take 1 tablet by mouth in the morning and at bedtime Yes Audrey Dillon MD   montelukast (SINGULAIR) 10 MG tablet Take 1 tablet by mouth nightly Yes Danny Peterson MD   denosumab (PROLIA) 60 MG/ML SOSY SC injection Inject 1 mL into the skin once Yes Reyes Torres MD   amLODIPine (NORVASC) 5 MG tablet Take 1 tablet by mouth daily  Patient not taking: Reported on 4/10/2025  Celio Lin MD   diclofenac sodium (VOLTAREN) 1 % GEL Apply 2 g topically 4 times daily as needed for Pain  Patient not taking: Reported on 4/10/2025  Reyes Torres MD CareTeam (Including outside providers/suppliers regularly involved

## 2025-04-10 NOTE — PROGRESS NOTES
Elva Guerrero is a 69 y.o. female      Chief Complaint   Patient presents with    Medicare AWV     Arthritis pain of knees. Takes amlodipine if needed.       \"Have you been to the ER, urgent care clinic since your last visit?  Hospitalized since your last visit?\"    NO    “Have you seen or consulted any other health care providers outside of [facility] since your last visit?”    NO        “Have you had a colorectal cancer screening such as a colonoscopy/FIT/Cologuard?    YES - Type: Colonoscopy - Where: 2/18/2025 [facility] Nurse/CMA to request most recent records if not in the chart     No colonoscopy on file  No cologuard on file  No FIT/FOBT on file   No flexible sigmoidoscopy on file         Click Here for Release of Records Request    Vitals:    04/10/25 1047   BP: 99/63   BP Site: Right Upper Arm   Patient Position: Sitting   BP Cuff Size: Medium Adult   Pulse: 71   Temp: 98.1 °F (36.7 °C)   TempSrc: Oral   SpO2: 97%   Weight: 60.5 kg (133 lb 6.4 oz)   Height: 1.524 m (5')           Medication Reconciliation Completed, changes notes. Please Update medication list.

## 2025-04-22 ENCOUNTER — OFFICE VISIT (OUTPATIENT)
Age: 70
End: 2025-04-22
Payer: MEDICARE

## 2025-04-22 VITALS
RESPIRATION RATE: 18 BRPM | TEMPERATURE: 97.4 F | WEIGHT: 132.8 LBS | BODY MASS INDEX: 25.07 KG/M2 | OXYGEN SATURATION: 98 % | HEART RATE: 65 BPM | DIASTOLIC BLOOD PRESSURE: 77 MMHG | HEIGHT: 61 IN | SYSTOLIC BLOOD PRESSURE: 136 MMHG

## 2025-04-22 DIAGNOSIS — M25.562 LEFT KNEE PAIN, UNSPECIFIED CHRONICITY: ICD-10-CM

## 2025-04-22 DIAGNOSIS — M17.11 PRIMARY OSTEOARTHRITIS OF RIGHT KNEE: ICD-10-CM

## 2025-04-22 DIAGNOSIS — M17.12 PRIMARY OSTEOARTHRITIS OF LEFT KNEE: ICD-10-CM

## 2025-04-22 DIAGNOSIS — M25.561 RIGHT KNEE PAIN, UNSPECIFIED CHRONICITY: Primary | ICD-10-CM

## 2025-04-22 PROCEDURE — 3075F SYST BP GE 130 - 139MM HG: CPT | Performed by: FAMILY MEDICINE

## 2025-04-22 PROCEDURE — 20611 DRAIN/INJ JOINT/BURSA W/US: CPT | Performed by: FAMILY MEDICINE

## 2025-04-22 PROCEDURE — PBSHW PBB SHADOW CHARGE: Performed by: FAMILY MEDICINE

## 2025-04-22 PROCEDURE — 1090F PRES/ABSN URINE INCON ASSESS: CPT | Performed by: FAMILY MEDICINE

## 2025-04-22 PROCEDURE — G8427 DOCREV CUR MEDS BY ELIG CLIN: HCPCS | Performed by: FAMILY MEDICINE

## 2025-04-22 PROCEDURE — 1125F AMNT PAIN NOTED PAIN PRSNT: CPT | Performed by: FAMILY MEDICINE

## 2025-04-22 PROCEDURE — 1036F TOBACCO NON-USER: CPT | Performed by: FAMILY MEDICINE

## 2025-04-22 PROCEDURE — 3078F DIAST BP <80 MM HG: CPT | Performed by: FAMILY MEDICINE

## 2025-04-22 PROCEDURE — 99213 OFFICE O/P EST LOW 20 MIN: CPT | Performed by: FAMILY MEDICINE

## 2025-04-22 PROCEDURE — G8419 CALC BMI OUT NRM PARAM NOF/U: HCPCS | Performed by: FAMILY MEDICINE

## 2025-04-22 PROCEDURE — 1123F ACP DISCUSS/DSCN MKR DOCD: CPT | Performed by: FAMILY MEDICINE

## 2025-04-22 PROCEDURE — 3017F COLORECTAL CA SCREEN DOC REV: CPT | Performed by: FAMILY MEDICINE

## 2025-04-22 PROCEDURE — G8399 PT W/DXA RESULTS DOCUMENT: HCPCS | Performed by: FAMILY MEDICINE

## 2025-04-22 PROCEDURE — 1159F MED LIST DOCD IN RCRD: CPT | Performed by: FAMILY MEDICINE

## 2025-04-22 RX ORDER — LIDOCAINE HYDROCHLORIDE 10 MG/ML
3 INJECTION, SOLUTION INFILTRATION; PERINEURAL ONCE
Status: COMPLETED | OUTPATIENT
Start: 2025-04-22 | End: 2025-04-22

## 2025-04-22 RX ORDER — LIDOCAINE HYDROCHLORIDE 10 MG/ML
INJECTION, SOLUTION EPIDURAL; INFILTRATION; INTRACAUDAL; PERINEURAL
Qty: 1 ML | Refills: 0
Start: 2025-04-22 | End: 2025-04-22 | Stop reason: CLARIF

## 2025-04-22 RX ORDER — BETAMETHASONE SODIUM PHOSPHATE AND BETAMETHASONE ACETATE 3; 3 MG/ML; MG/ML
12 INJECTION, SUSPENSION INTRA-ARTICULAR; INTRALESIONAL; INTRAMUSCULAR; SOFT TISSUE ONCE
Status: COMPLETED | OUTPATIENT
Start: 2025-04-22 | End: 2025-04-22

## 2025-04-22 RX ORDER — LIDOCAINE HYDROCHLORIDE 10 MG/ML
INJECTION, SOLUTION EPIDURAL; INFILTRATION; INTRACAUDAL; PERINEURAL
Qty: 3 ML | Refills: 0
Start: 2025-04-22 | End: 2025-04-22 | Stop reason: CLARIF

## 2025-04-22 RX ADMIN — BETAMETHASONE ACETATE AND BETAMETHASONE SODIUM PHOSPHATE 12 MG: 3; 3 INJECTION, SUSPENSION INTRA-ARTICULAR; INTRALESIONAL; INTRAMUSCULAR; SOFT TISSUE at 13:17

## 2025-04-22 RX ADMIN — LIDOCAINE HYDROCHLORIDE 3 ML: 10 INJECTION, SOLUTION INFILTRATION; PERINEURAL at 13:34

## 2025-04-22 RX ADMIN — BETAMETHASONE ACETATE AND BETAMETHASONE SODIUM PHOSPHATE 12 MG: 3; 3 INJECTION, SUSPENSION INTRA-ARTICULAR; INTRALESIONAL; INTRAMUSCULAR; SOFT TISSUE at 13:16

## 2025-04-22 ASSESSMENT — PATIENT HEALTH QUESTIONNAIRE - PHQ9
SUM OF ALL RESPONSES TO PHQ QUESTIONS 1-9: 0
SUM OF ALL RESPONSES TO PHQ QUESTIONS 1-9: 0
1. LITTLE INTEREST OR PLEASURE IN DOING THINGS: NOT AT ALL
2. FEELING DOWN, DEPRESSED OR HOPELESS: NOT AT ALL
SUM OF ALL RESPONSES TO PHQ QUESTIONS 1-9: 0
SUM OF ALL RESPONSES TO PHQ QUESTIONS 1-9: 0

## 2025-04-22 NOTE — PROGRESS NOTES
Identified pt with two pt identifiers(name and ). Reviewed record in preparation for visit and have obtained necessary documentation.  Chief Complaint   Patient presents with    Knee Pain     Bilateral, no injury, x 4 months of pain, advil helps with pain at times        Health Maintenance Due   Topic    Colorectal Cancer Screen     Respiratory Syncytial Virus (RSV) Pregnant or age 60 yrs+ (1 - Risk 60-74 years 1-dose series)    COVID-19 Vaccine ( season)    Diabetes screen        Vitals:    25 1058 25 1104   BP: (!) 142/79 136/77   BP Site: Right Upper Arm Left Upper Arm   Patient Position: Sitting Sitting   BP Cuff Size: Medium Adult Medium Adult   Pulse: 65    Resp: 18    Temp: 97.4 °F (36.3 °C)    TempSrc: Oral    SpO2: 98%    Weight: 60.2 kg (132 lb 12.8 oz)    Height: 1.549 m (5' 0.98\")          \"Have you been to the ER, urgent care clinic since your last visit?  Hospitalized since your last visit?\"    NO    “Have you seen or consulted any other health care providers outside of [facility] since your last visit?”    NO        “Have you had a colorectal cancer screening such as a colonoscopy/FIT/Cologuard?    YES - Type: Colonoscopy - Where: 2025 [facility] Nurse/CMA to request most recent records if not in the chart     No colonoscopy on file  No cologuard on file  No FIT/FOBT on file   No flexible sigmoidoscopy on file         Click Here for Release of Records Request     This patient is accompanied in the office by her self.  I have received verbal consent from Elva Guerrero to discuss any/all medical information while they are present in the room.  
there were no complications.    PROCEDURE NOTE:     Informed consent obtained verbally and risks, benefits and alternatives discussed.  Time out performed, cross checking patient ID and procedure.  The left knee was cleaned and prepped with sterile technique using Chloraprep x3 and anesthetized with ethyl chloride spray.  The patella, quad tendon, femur and the superior joint space were identified with ultrasound and the knee was injected from a superior-lateral approach with Celestone 12mg and 3ml of 1% lidocaine under sterile conditions using ultrasound guidance.  The patient tolerated the procedure well and there were no complications.          ASSESSMENT:  Bilateral knee pain likely 2/2 DJD. Discussed treatment options and she would like to proceed with CSI.      PLAN:    1. Home Exercise Program as per handout.  2. CSI as above.     Medications:    1.  Naproxin (Aleve): 220mg 1-2 tablets twice a day PRN.   2. Acetaminophen (Tylenol):  500mg 1-2 tablets every 6 hours as needed for pain.    RTC: PRN

## 2025-06-13 ENCOUNTER — TELEPHONE (OUTPATIENT)
Age: 70
End: 2025-06-13

## 2025-06-13 NOTE — TELEPHONE ENCOUNTER
Pt sates prescription for BREO ELLIPTA 200-25 MCG/ACT AEPB inhaler has , pt is requesting new prescription    [facility] [place] [facility] - [address] [phone] - F [phone]  [facility] [place] [address]  Phone: [phone]  Fax: [phone]

## 2025-06-13 NOTE — TELEPHONE ENCOUNTER
Patient's pharmacy requesting refill for BREO ELLIPTA 200-25 MCG/ACT AEPB inhaler.    [facility] - [place] - P [phone] - F [phone]

## 2025-06-15 ENCOUNTER — TRANSCRIBE ORDERS (OUTPATIENT)
Facility: HOSPITAL | Age: 70
End: 2025-06-15

## 2025-06-15 DIAGNOSIS — M81.0 AGE-RELATED OSTEOPOROSIS WITHOUT CURRENT PATHOLOGICAL FRACTURE: Primary | ICD-10-CM

## 2025-07-01 ENCOUNTER — HOSPITAL ENCOUNTER (OUTPATIENT)
Facility: HOSPITAL | Age: 70
Discharge: HOME | End: 2025-07-04
Attending: INTERNAL MEDICINE
Payer: MEDICARE

## 2025-07-01 DIAGNOSIS — M81.0 AGE-RELATED OSTEOPOROSIS WITHOUT CURRENT PATHOLOGICAL FRACTURE: ICD-10-CM

## 2025-07-01 PROCEDURE — 77080 DXA BONE DENSITY AXIAL: CPT

## 2025-07-29 ENCOUNTER — COMMUNITY OUTREACH (OUTPATIENT)
Age: 70
End: 2025-07-29

## 2025-07-29 DIAGNOSIS — T78.40XA WHEEZING DUE TO ALLERGY: ICD-10-CM

## 2025-07-29 DIAGNOSIS — R06.2 WHEEZING DUE TO ALLERGY: ICD-10-CM

## 2025-07-29 NOTE — PROGRESS NOTES
Patient's HM shows they are overdue for Colorectal Screening.   Care Everywhere and  files searched.  No results to attach to order nor HM updated.      Faxed request to Dr. Ismael SARABIA at [phone].

## 2025-07-29 NOTE — TELEPHONE ENCOUNTER
Medication Refill Request    Elva Guerrero is requesting a refill of the following medication(s):   Requested Prescriptions     Pending Prescriptions Disp Refills    montelukast (SINGULAIR) 10 MG tablet 90 tablet 1     Sig: Take 1 tablet by mouth nightly        Listed PCP is Celio Lin MD   Last provider to prescribe medication: Nicholas  Last Date of Medication Prescribed: 5/17/24   Date of Last Office Visit at [facility]: 4/22/25   FUTURE APPOINTMENT:   Future Appointments   Date Time Provider Department Center   7/31/2025 11:00 AM David Cool MD [facility]   10/29/2025 11:00 AM [facility]       [facility]:    [facility] [place] [facility] - P [phone] - F [phone]  [facility]  [place] [address]  Phone: [phone] Fax: [phone]      Please review request and approve or deny with recommendations.

## 2025-07-30 RX ORDER — MONTELUKAST SODIUM 10 MG/1
10 TABLET ORAL NIGHTLY
Qty: 90 TABLET | Refills: 1 | Status: SHIPPED | OUTPATIENT
Start: 2025-07-30

## 2025-07-31 ENCOUNTER — OFFICE VISIT (OUTPATIENT)
Age: 70
End: 2025-07-31
Payer: MEDICARE

## 2025-07-31 VITALS
DIASTOLIC BLOOD PRESSURE: 76 MMHG | OXYGEN SATURATION: 97 % | TEMPERATURE: 98 F | RESPIRATION RATE: 18 BRPM | HEART RATE: 78 BPM | BODY MASS INDEX: 26.62 KG/M2 | WEIGHT: 135.6 LBS | HEIGHT: 60 IN | SYSTOLIC BLOOD PRESSURE: 129 MMHG

## 2025-07-31 DIAGNOSIS — G89.29 CHRONIC PAIN OF BOTH KNEES: Primary | ICD-10-CM

## 2025-07-31 DIAGNOSIS — M25.562 CHRONIC PAIN OF BOTH KNEES: Primary | ICD-10-CM

## 2025-07-31 DIAGNOSIS — M17.12 PRIMARY OSTEOARTHRITIS OF LEFT KNEE: ICD-10-CM

## 2025-07-31 DIAGNOSIS — M17.11 PRIMARY OSTEOARTHRITIS OF RIGHT KNEE: ICD-10-CM

## 2025-07-31 DIAGNOSIS — M25.561 CHRONIC PAIN OF BOTH KNEES: Primary | ICD-10-CM

## 2025-07-31 PROCEDURE — G8427 DOCREV CUR MEDS BY ELIG CLIN: HCPCS | Performed by: FAMILY MEDICINE

## 2025-07-31 PROCEDURE — 99213 OFFICE O/P EST LOW 20 MIN: CPT | Performed by: FAMILY MEDICINE

## 2025-07-31 PROCEDURE — 1159F MED LIST DOCD IN RCRD: CPT | Performed by: FAMILY MEDICINE

## 2025-07-31 PROCEDURE — 3078F DIAST BP <80 MM HG: CPT | Performed by: FAMILY MEDICINE

## 2025-07-31 PROCEDURE — 3017F COLORECTAL CA SCREEN DOC REV: CPT | Performed by: FAMILY MEDICINE

## 2025-07-31 PROCEDURE — 1090F PRES/ABSN URINE INCON ASSESS: CPT | Performed by: FAMILY MEDICINE

## 2025-07-31 PROCEDURE — 20611 DRAIN/INJ JOINT/BURSA W/US: CPT | Performed by: FAMILY MEDICINE

## 2025-07-31 PROCEDURE — 1123F ACP DISCUSS/DSCN MKR DOCD: CPT | Performed by: FAMILY MEDICINE

## 2025-07-31 PROCEDURE — 1036F TOBACCO NON-USER: CPT | Performed by: FAMILY MEDICINE

## 2025-07-31 PROCEDURE — G8419 CALC BMI OUT NRM PARAM NOF/U: HCPCS | Performed by: FAMILY MEDICINE

## 2025-07-31 PROCEDURE — G8399 PT W/DXA RESULTS DOCUMENT: HCPCS | Performed by: FAMILY MEDICINE

## 2025-07-31 PROCEDURE — 3074F SYST BP LT 130 MM HG: CPT | Performed by: FAMILY MEDICINE

## 2025-07-31 NOTE — PROGRESS NOTES
Identified pt with two pt identifiers(name and ). Reviewed record in preparation for visit and have obtained necessary documentation.  Chief Complaint   Patient presents with    Knee Pain     Bilateral knee pain        Vitals:    25 1118   BP: 129/76   BP Site: Right Upper Arm   Patient Position: Sitting   BP Cuff Size: Medium Adult   Pulse: 78   Resp: 18   Temp: 98 °F (36.7 °C)   TempSrc: Temporal   SpO2: 97%   Weight: 61.5 kg (135 lb 9.6 oz)   Height: 1.524 m (5')         Coordination of Care Questionnaire:  :     \"Have you been to the ER, urgent care clinic since your last visit?  Hospitalized since your last visit?\"    NO    “Have you seen or consulted any other health care providers outside of [facility] since your last visit?”    NO        “Have you had a colorectal cancer screening such as a colonoscopy/FIT/Cologuard?    YES - Type: Colonoscopy - Where: 2025  Nurse/CMA to request most recent records if not in the chart     No colonoscopy on file  No cologuard on file  No FIT/FOBT on file   No flexible sigmoidoscopy on file         Click Here for Release of Records Request   
appropriately.  Lungs: No labored respirations.  Talking in complete sentences without difficulty.  Musculoskeletal:  Knee: bilaterally  Knee Effusion: small  Quadriceps atrophy: None     ROM:  Flexion: 110  Extension: 0   Hip IR/ER: FROM without pain    Dynamic Test:  Gait: antalgic  Assistive devices: None    Palpation:   Patella tenderness: None  Patellar tendon tenderness: None  Quad tendon tenderness: None  Medial joint line tenderness: tender  Lateral joint line tenderness: tender  Medial facet tenderness: None  Lateral facet tenderness: None  Condyle tenderness: None  Tibia tubercle tenderness: None  Proximal fibula tenderness: None    Strength (0-5/5):   Flexion: Left: 5/5    Right: 5/5    Extension: Left: 5/5    Right: 5/5    Hip abduction: 5/5    Hip adduction: 5/5      Neuro/Vascular : Pulses intact, no edema, and neurologically intact .  Skin: No obvious rash or skin breakdown.      ASSESSMENT/PLAN:    Bilateral knee pain: Likely 2/2 RA.  Discussed treatment options.  Will proceed with CSI of the right knee as this is the most painful and she does not have much pain in the left today. Can consider injection of the left knee in the future if needed.  She has an appointment with Rheum in 2 weeks.  Discussed that adjustment of her RA medication would likely be beneficial as well to help with symptoms.     1. Home Exercise Program as per handout.  2. Ice 15 minutes, three times a day PRN and after exercise.  Can alternate with heat for 15 minutes.   3. CSI as above  4. Follow up with Rheum    Medications:    1.  Acetaminophen (Tylenol):  500mg 1-2 tablets every 6 hours as needed for pain.    RTC:PRN

## 2025-08-01 RX ORDER — BETAMETHASONE SODIUM PHOSPHATE AND BETAMETHASONE ACETATE 3; 3 MG/ML; MG/ML
12 INJECTION, SUSPENSION INTRA-ARTICULAR; INTRALESIONAL; INTRAMUSCULAR; SOFT TISSUE ONCE
Status: SHIPPED | OUTPATIENT
Start: 2025-08-01

## 2025-08-01 RX ORDER — LIDOCAINE HYDROCHLORIDE 10 MG/ML
3 INJECTION, SOLUTION INFILTRATION; PERINEURAL ONCE
Status: SHIPPED | OUTPATIENT
Start: 2025-08-01

## 2025-08-28 DIAGNOSIS — T78.40XA WHEEZING DUE TO ALLERGY: ICD-10-CM

## 2025-08-28 DIAGNOSIS — R06.2 WHEEZING DUE TO ALLERGY: ICD-10-CM

## 2025-08-29 RX ORDER — FLUTICASONE FUROATE AND VILANTEROL TRIFENATATE 200; 25 UG/1; UG/1
1 POWDER RESPIRATORY (INHALATION) DAILY
Qty: 60 EACH | Refills: 1 | Status: SHIPPED | OUTPATIENT
Start: 2025-08-29